# Patient Record
Sex: FEMALE | Race: WHITE | NOT HISPANIC OR LATINO | Employment: STUDENT | URBAN - METROPOLITAN AREA
[De-identification: names, ages, dates, MRNs, and addresses within clinical notes are randomized per-mention and may not be internally consistent; named-entity substitution may affect disease eponyms.]

---
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
## 2018-05-30 ENCOUNTER — OFFICE VISIT (OUTPATIENT)
Dept: INTERNAL MEDICINE | Facility: CLINIC | Age: 18
End: 2018-05-30
Payer: COMMERCIAL

## 2018-05-30 VITALS
BODY MASS INDEX: 25.93 KG/M2 | HEIGHT: 64 IN | SYSTOLIC BLOOD PRESSURE: 124 MMHG | HEART RATE: 106 BPM | TEMPERATURE: 99 F | WEIGHT: 151.88 LBS | DIASTOLIC BLOOD PRESSURE: 95 MMHG

## 2018-05-30 DIAGNOSIS — J06.9 UPPER RESPIRATORY INFECTION, VIRAL: ICD-10-CM

## 2018-05-30 DIAGNOSIS — Z87.09 HISTORY OF ASTHMA: ICD-10-CM

## 2018-05-30 DIAGNOSIS — J30.2 SEASONAL ALLERGIC RHINITIS, UNSPECIFIED TRIGGER: Primary | ICD-10-CM

## 2018-05-30 DIAGNOSIS — Z23 NEED FOR HPV VACCINATION: ICD-10-CM

## 2018-05-30 PROCEDURE — 99999 PR PBB SHADOW E&M-EST. PATIENT-LVL IV: CPT | Mod: PBBFAC,,, | Performed by: STUDENT IN AN ORGANIZED HEALTH CARE EDUCATION/TRAINING PROGRAM

## 2018-05-30 PROCEDURE — 3008F BODY MASS INDEX DOCD: CPT | Mod: CPTII,S$GLB,, | Performed by: STUDENT IN AN ORGANIZED HEALTH CARE EDUCATION/TRAINING PROGRAM

## 2018-05-30 PROCEDURE — 99385 PREV VISIT NEW AGE 18-39: CPT | Mod: S$GLB,,, | Performed by: STUDENT IN AN ORGANIZED HEALTH CARE EDUCATION/TRAINING PROGRAM

## 2018-05-30 RX ORDER — LORATADINE 10 MG/1
10 TABLET ORAL DAILY
Refills: 0 | COMMUNITY
Start: 2018-05-30 | End: 2024-01-12

## 2018-05-30 NOTE — PROGRESS NOTES
Clinic Note  5/30/2018      Subjective:       Patient ID:  Almas is a 18 y.o. female being seen for an established visit.    Chief Complaint: Annual Exam    18 years old female patient with past medical history of asthma came to establish care and complaining of nasal congestion.   Patient was in her usual state of health until sunday morning, 3 days ago, when she had abdominal discomfort and diarrhea 1 time, non bloody and resolved and later developed sore throat, nasal congestion, chest tightness,mild dry cough and runny nose, yesterday she noticed orange mucus when she blow her nose. Has b/l ear ringing but no ear pain, sore throat resolved, denied chest pain, shortness of breath, fever/chills, palpitation.  Patient exercise daily for 20 min, started vegetarian diet few days ago, college student, lives with her mother, doesn't smoke or drink.        Review of Systems   Constitutional: Negative for chills and fever.   HENT: Positive for tinnitus (B/L). Negative for hearing loss.    Eyes: Negative for double vision and photophobia.   Respiratory: Negative for sputum production and shortness of breath.    Cardiovascular: Negative for chest pain and palpitations.   Gastrointestinal: Negative for heartburn, nausea and vomiting.   Genitourinary: Negative for dysuria and urgency.   Musculoskeletal: Negative for back pain, myalgias and neck pain.   Skin: Negative for itching and rash.   Neurological: Negative for dizziness, tingling and headaches.   Endo/Heme/Allergies: Does not bruise/bleed easily.   Psychiatric/Behavioral: Negative for depression, substance abuse and suicidal ideas.       Past Medical History:   Diagnosis Date    Asthma     Kidney stone     Seasonal allergic rhinitis 5/30/2018       Family History   Problem Relation Age of Onset    Diabetes Maternal Grandmother         reports that she has never smoked. She has never used smokeless tobacco. She reports that she does not drink alcohol or use  "drugs.    Medication List with Changes/Refills   New Medications    LORATADINE (CLARITIN) 10 MG TABLET    Take 1 tablet (10 mg total) by mouth once daily.   Discontinued Medications    ONDANSETRON (ZOFRAN) 4 MG TABLET    Take 1 tablet (4 mg total) by mouth every 6 (six) hours as needed.     Review of patient's allergies indicates:   Allergen Reactions    Ceftriaxone Hives    Cefzil [cefprozil] Hives       Patient Active Problem List   Diagnosis    Seasonal allergic rhinitis           Objective:      BP (!) 124/95   Pulse 106   Temp 98.5 °F (36.9 °C)   Ht 5' 4" (1.626 m)   Wt 68.9 kg (151 lb 14.4 oz)   BMI 26.07 kg/m²   Estimated body mass index is 26.07 kg/m² as calculated from the following:    Height as of this encounter: 5' 4" (1.626 m).    Weight as of this encounter: 68.9 kg (151 lb 14.4 oz).  Physical Exam   Constitutional: She is oriented to person, place, and time and well-developed, well-nourished, and in no distress. No distress.   HENT:   Head: Normocephalic and atraumatic.   Right Ear: Tympanic membrane, external ear and ear canal normal. No drainage or swelling. Tympanic membrane is not erythematous.   Left Ear: Tympanic membrane, external ear and ear canal normal. No drainage or swelling. Tympanic membrane is not erythematous.   Mouth/Throat: Uvula is midline and mucous membranes are normal. Mucous membranes are not pale and not dry. No oropharyngeal exudate.   Facial acne    Eyes: Conjunctivae are normal. Pupils are equal, round, and reactive to light. No scleral icterus.   Neck: Neck supple.   Cardiovascular: Normal rate, regular rhythm and normal heart sounds.    No murmur heard.  Pulmonary/Chest: Effort normal. No respiratory distress. She has no wheezes. She has no rales.   Abdominal: Soft. Bowel sounds are normal. She exhibits no distension. There is no tenderness.   Musculoskeletal: Normal range of motion. She exhibits no edema, tenderness or deformity.   Lymphadenopathy:     She has no " cervical adenopathy.   Neurological: She is alert and oriented to person, place, and time. No cranial nerve deficit.   Skin: No rash noted. She is not diaphoretic. No erythema. No pallor.   Psychiatric: Memory, affect and judgment normal.   Nursing note and vitals reviewed.        Assessment and Plan:         Almas was seen today for annual exam.    Diagnoses and all orders for this visit:    Encounter to establish care   - HPV vaccine series.   Upper respiratory infection, viral  - reassurance   - supportive therapy.    Seasonal allergic rhinitis, unspecified trigger    -  loratadine (CLARITIN) 10 mg tablet; Take 1 tablet (10 mg total) by mouth once daily.        Michoacano Garcia  Internal Medicine, PGY 1  [phone]

## 2018-05-30 NOTE — PATIENT INSTRUCTIONS
Adult Self-Care for Colds  Colds are caused by viruses. They can't be cured with antibiotics. However, you can ease symptoms and support your body's efforts to heal itself.  No matter which symptoms you have, be sure to:  · Drink plenty of fluids (water or clear soup)  · Stop smoking and drinking alcohol  · Get plenty of rest    Understand a fever  · Take your temperature several times a day. If your fever is 100.4°F (38.0°C) for more than a day, call your healthcare provider.  · Relax, lie down. Go to bed if you want. Just get off your feet and rest. Also, drink plenty of fluids to avoid dehydration.  · Take acetaminophen or a nonsteroidal anti-inflammatory agent (NSAID), such as ibuprofen.  Treat a troubled nose kindly  · Breathe steam or heated humidified air to open blocked nasal passages.  a hot shower or use a vaporizer. Be careful not to get burned by the steam.  · Saline nasal sprays and decongestant tablets help open a stuffy nose. Antihistamines can also help, but they can cause side effects such as drowsiness and drying of the eyes, nose, and mouth.  Soothe a sore throat and cough  · Gargle every 2 hours with 1/4 teaspoon of salt dissolved in 1/2 cup of warm water. Suck on throat lozenges and cough drops to moisten your throat.  · Cough medicines are available but it is unclear how well they actually work.  · Take acetaminophen or an NSAID, such as ibuprofen, to ease throat pain  Ease digestive problems  · Put fluids back into your body. Take frequent sips of clear liquids such as water or broth. Avoid drinks that have a lot of sugar in them, such as juices and sodas. These can make diarrhea worse. Older children and adults can drink sports drinks.  · As your appetite returns, you can resume your normal diet. Ask your healthcare provider if there are any foods you should avoid.  When to seek medical care  When you first notice symptoms, ask your healthcare provider if antiviral medicines are  appropriate. Antibiotics should not be taken for colds or flu. Also, call your healthcare provider if you have any of the following symptoms or if you aren't feeling better after 7 days:  · Shortness of breath  · Pain or pressure in the chest or belly (abdomen)  · Worsening symptoms, especially after a period of improvement  · Fever of 100.4°F  (38.0°C) or higher, or fever that doesn't go down with medicine  · Sudden dizziness or confusion  · Severe or continued vomiting  · Signs of dehydration, including extreme thirst, dark urine, infrequent urination, dry mouth  · Spotted, red, or very sore throat   Date Last Reviewed: 12/1/2016 © 2000-2017 [employer]. [address] [place] [address], [place] [address]. All rights reserved. This information is not intended as a substitute for professional medical care. Always follow your healthcare professional's instructions.        What is Genital HPV?  HPV (human papillomavirus) is a very common family of viruses. Some strains of genital HPV can cause abnormal changes (dysplasia) in the cells of a womans cervix. In a small number of women, these changes can lead to cancer if not treated. Also, certain strains of HPV can cause genital warts (condyloma). Although many people carry HPV, it often causes no symptoms. The virus may first be detected when signs of dysplasia or warts are found during a Pap test.     Dysplasia develops when cervical cells change in ways that are not normal.             Warts on the cervix can be detected with a Pap test.             Warts around the genitals may be visible. These external warts can affect the vulva, vagina, and anus.      How does HPV spread?  HPV lives inside skin and mucous membranes. It spreads when skin carrying the virus touches other skin. Genital HPV most often spreads during sexual contact. Condoms and other barriers help protect against the spread by preventing skin contact. But condoms may not cover all affected skin,  so they may not provide complete protection. There is no cure for HPV. Even if symptoms go away, the virus may remain in the body. Because it often doesnt cause symptoms, many people who have HPV dont even know it.  When dysplasia occurs  The cervix is the narrow canal at the bottom of the uterus. Its made up of layers of cells that normally change as they grow. Dysplasia occurs when HPV causes some cervical cells to change in ways that are not normal. These abnormal cells can be detected with a Pap test. Left  untreated, abnormal cells can develop into cancer.  When warts form  Certain strains of HPV can make skin cells reproduce more often than they should. These extra skin cells build up into warts. Warts can form on the cervix. They can also form around or inside the genitals. Treating warts helps keep HPV from spreading to sexual partners.     A vaccine is available that protects against certain strains of HPV. Your health care provider can tell you more.   Date Last Reviewed: 1/1/2017  © 5635-0243 [employer]. [address] [place] [address], [place] [address]. All rights reserved. This information is not intended as a substitute for professional medical care. Always follow your healthcare professional's instructions.

## 2018-06-04 NOTE — PROGRESS NOTES
Preceptor note    Patient's history and physical discussed, please refer to resident physician's note for specific details. Pt seen with resident physician. Medical record reviewed. I agree with resident's assessment and plan.    In addition, provided health maintenance counseling -  1. Reviewed immunization in LINKS - only due for HPV series; patient advised that she needs a series of 3 vaccines  2. Patient advised to start cervical cancer screening at age 19  3. Patient is otherwise up-to-date

## 2018-09-07 ENCOUNTER — OFFICE VISIT (OUTPATIENT)
Dept: DERMATOLOGY | Facility: CLINIC | Age: 18
End: 2018-09-07
Payer: COMMERCIAL

## 2018-09-07 DIAGNOSIS — B36.0 TINEA VERSICOLOR: ICD-10-CM

## 2018-09-07 DIAGNOSIS — L70.0 ACNE VULGARIS: Primary | ICD-10-CM

## 2018-09-07 PROCEDURE — 99999 PR PBB SHADOW E&M-EST. PATIENT-LVL III: CPT | Mod: PBBFAC,,, | Performed by: NURSE PRACTITIONER

## 2018-09-07 PROCEDURE — 99202 OFFICE O/P NEW SF 15 MIN: CPT | Mod: S$GLB,,, | Performed by: NURSE PRACTITIONER

## 2018-09-07 RX ORDER — CLINDAMYCIN PHOSPHATE AND TRETINOIN GEL 1.2%/0.025% 10; .25 MG/G; MG/G
GEL TOPICAL NIGHTLY
Qty: 30 G | Refills: 2 | Status: SHIPPED | OUTPATIENT
Start: 2018-09-07 | End: 2021-01-28

## 2018-09-07 RX ORDER — SPIRONOLACTONE 50 MG/1
TABLET, FILM COATED ORAL
Qty: 60 TABLET | Refills: 3 | Status: SHIPPED | OUTPATIENT
Start: 2018-09-07 | End: 2018-11-09

## 2018-09-07 RX ORDER — DOXYCYCLINE 100 MG/1
CAPSULE ORAL
Qty: 30 CAPSULE | Refills: 1 | Status: SHIPPED | OUTPATIENT
Start: 2018-09-07 | End: 2020-04-15 | Stop reason: SDUPTHER

## 2018-09-07 RX ORDER — KETOCONAZOLE 20 MG/ML
SHAMPOO, SUSPENSION TOPICAL
Qty: 120 ML | Refills: 5 | Status: SHIPPED | OUTPATIENT
Start: 2018-09-07 | End: 2021-01-28

## 2018-09-07 NOTE — PATIENT INSTRUCTIONS

## 2018-09-07 NOTE — PROGRESS NOTES
"  Subjective:       Patient ID:  Almas Colon is a 18 y.o. female who presents for   Chief Complaint   Patient presents with    Acne     Acne  - Initial  Affected locations: face  Duration: 1 month  Signs / symptoms: redness and tender  Severity: mild  Timing: constant  Aggravated by: nothing  Treatments tried: washing face BID w/ "black african soap"       Pt reports previously was seen by derm ~years ago. Not @ [facility].    Review of Systems   Genitourinary: Negative for irregular periods (not on ocp ).        H/o kidney stones 2/2 cocacola intake   Skin: Positive for activity-related sunscreen use. Negative for daily sunscreen use.        Objective:    Physical Exam   Constitutional: She appears well-developed and well-nourished. No distress.   Neurological: She is alert and oriented to person, place, and time. She is not disoriented.   Psychiatric: She has a normal mood and affect.   Skin:   Areas Examined (abnormalities noted in diagram):   Head / Face Inspection Performed  Neck Inspection Performed  Chest / Axilla Inspection Performed  Back Inspection Performed  RUE Inspected  LUE Inspection Performed                   Diagram Legend     Erythematous scaling macule/papule c/w actinic keratosis       Vascular papule c/w angioma      Pigmented verrucoid papule/plaque c/w seborrheic keratosis      Yellow umbilicated papule c/w sebaceous hyperplasia      Irregularly shaped tan macule c/w lentigo     1-2 mm smooth white papules consistent with Milia      Movable subcutaneous cyst with punctum c/w epidermal inclusion cyst      Subcutaneous movable cyst c/w pilar cyst      Firm pink to brown papule c/w dermatofibroma      Pedunculated fleshy papule(s) c/w skin tag(s)      Evenly pigmented macule c/w junctional nevus     Mildly variegated pigmented, slightly irregular-bordered macule c/w mildly atypical nevus      Flesh colored to evenly pigmented papule c/w intradermal nevus       Pink pearly papule/plaque c/w " basal cell carcinoma      Erythematous hyperkeratotic cursted plaque c/w SCC      Surgical scar with no sign of skin cancer recurrence      Open and closed comedones      Inflammatory papules and pustules      Verrucoid papule consistent consistent with wart     Erythematous eczematous patches and plaques     Dystrophic onycholytic nail with subungual debris c/w onychomycosis     Umbilicated papule    Erythematous-base heme-crusted tan verrucoid plaque consistent with inflamed seborrheic keratosis     Erythematous Silvery Scaling Plaque c/w Psoriasis     See annotation      Assessment / Plan:        Acne vulgaris  -     clindamycin-tretinoin (ZIANA) gel; Apply topically every evening.  Dispense: 30 g; Refill: 2  -     doxycycline (VIBRAMYCIN) 100 MG Cap; 1 tab PO bid x10 days followed by 1 tab po daily.  Dispense: 30 capsule; Refill: 1  -     spironolactone (ALDACTONE) 50 MG tablet; Start with 1 po qday, increase to 2 po qday as tolerated  Dispense: 60 tablet; Refill: 3    Discussed benefits and risks of therapy including but not limited to breakthrough bleeding, breast tenderness, and elevated potassium levels which may give symptoms of fatigue, palpitations, and nausea. Patient should limit potassium intake - avoid potassium supplements or salt substitutes, limit bananas and citrus fruits. Pregnancy must be avoided while taking spironolactone.    Discussed benefits and risks of doxycyline therapy including but not limited to GI discomfort, esophageal irritation/ulceration, and increased sun sensitivity. Patient was counseled to take medicine with meals and at least 1 hour before lying down.     Discussed using pea-sized drop to entire face qhs.  Start applying every 2-3 nights then gradually increase to nightly application as tolerated.  May notice mild redness and irritation- cerave pm after.    Wash face BID w/ cerave q am and [place] arthur green tea q pm    Emphasised hydration status while on spironolactone    Tinea  versicolor  -     ketoconazole (NIZORAL) 2 % shampoo; Wash body and chest nightly x2 weeks. And then weekly after for maintenance  Dispense: 120 mL; Refill: 5    Discussed TV is an overgrowth of yeast on the skin. Yeast normally lives on our skin surface, however, when living in hot and humid climates yeast will grow more quickly. An overgrowth of yeast on the skin can cause Tv. TV is not contagious.            Follow-up in about 2 months (around 11/7/2018).

## 2018-09-07 NOTE — LETTER
September 7, 2018      PADILLA Sheets  [address] [facility] [address]           [facility]  [address]  [place] [address]  Phone: [phone]  Fax: [phone]          Patient: Almas Colon   MR Number: [id number]   YOB: 2000   Date of Visit: 9/7/2018       Dear Dr. Michoacano Garcia:    Thank you for referring Almas Colon to me for evaluation. Attached you will find relevant portions of my assessment and plan of care.    If you have questions, please do not hesitate to call me. I look forward to following Almas Colon along with you.    Sincerely,    Kassandra Allison, NP    Enclosure  CC:  No Recipients    If you would like to receive this communication electronically, please contact [email or web address] or [phone] to request more information on [employer] Link access.    For providers and/or their staff who would like to refer a patient to [facility], please contact us through our one-stop-shop provider referral line, [facility], at [phone].    If you feel you have received this communication in error or would no longer like to receive these types of communications, please e-mail [email or web address]

## 2018-11-09 ENCOUNTER — OFFICE VISIT (OUTPATIENT)
Dept: DERMATOLOGY | Facility: CLINIC | Age: 18
End: 2018-11-09
Payer: COMMERCIAL

## 2018-11-09 DIAGNOSIS — L81.0 POSTINFLAMMATORY HYPERPIGMENTATION: ICD-10-CM

## 2018-11-09 DIAGNOSIS — L70.0 ACNE VULGARIS: Primary | ICD-10-CM

## 2018-11-09 DIAGNOSIS — L90.5 SCAR: ICD-10-CM

## 2018-11-09 PROCEDURE — 99213 OFFICE O/P EST LOW 20 MIN: CPT | Mod: S$GLB,,, | Performed by: NURSE PRACTITIONER

## 2018-11-09 PROCEDURE — 99999 PR PBB SHADOW E&M-EST. PATIENT-LVL II: CPT | Mod: PBBFAC,,, | Performed by: NURSE PRACTITIONER

## 2018-11-09 RX ORDER — DAPSONE 75 MG/G
GEL TOPICAL
Qty: 60 G | Refills: 3 | Status: SHIPPED | OUTPATIENT
Start: 2018-11-09 | End: 2021-01-28

## 2018-11-09 RX ORDER — TAZAROTENE 1 MG/G
CREAM TOPICAL NIGHTLY
Qty: 30 G | Refills: 2 | Status: SHIPPED | OUTPATIENT
Start: 2018-11-09 | End: 2020-04-15 | Stop reason: SDUPTHER

## 2018-11-09 NOTE — PROGRESS NOTES
Subjective:       Patient ID:  Almas Colon is a 18 y.o. female who presents for   Chief Complaint   Patient presents with    Follow-up     Acne f/u     Acne  - Follow-up  Symptom course: stable (9/7/18)  Currently using: washing face BID cerave. ziana nightly. stopped doxy, stopped spironolatone 2/2 unable to tolerate- effecting concentration   Affected locations: face, back, neck and chest  Signs / symptoms: redness and tender  Severity: mild        Review of Systems   Constitutional: Negative for fever, chills and fatigue.   HENT: Negative for nosebleeds and headaches.    Gastrointestinal: Negative for nausea and vomiting.   Genitourinary: Negative for irregular periods.        H/o kidney stones 2/2 carbonated beverages   Skin: Positive for daily sunscreen use and activity-related sunscreen use. Negative for recent sunburn.   Neurological: Negative for headaches.   Hematologic/Lymphatic: Bruises/bleeds easily.        Objective:    Physical Exam   Constitutional: She appears well-developed and well-nourished. No distress.   Neurological: She is alert and oriented to person, place, and time. She is not disoriented.   Psychiatric: She has a normal mood and affect.   Skin:   Areas Examined (abnormalities noted in diagram):   Head / Face Inspection Performed  Neck Inspection Performed  Chest / Axilla Inspection Performed  Back Inspection Performed  RUE Inspected  LUE Inspection Performed                   Diagram Legend     Erythematous scaling macule/papule c/w actinic keratosis       Vascular papule c/w angioma      Pigmented verrucoid papule/plaque c/w seborrheic keratosis      Yellow umbilicated papule c/w sebaceous hyperplasia      Irregularly shaped tan macule c/w lentigo     1-2 mm smooth white papules consistent with Milia      Movable subcutaneous cyst with punctum c/w epidermal inclusion cyst      Subcutaneous movable cyst c/w pilar cyst      Firm pink to brown papule c/w dermatofibroma       Pedunculated fleshy papule(s) c/w skin tag(s)      Evenly pigmented macule c/w junctional nevus     Mildly variegated pigmented, slightly irregular-bordered macule c/w mildly atypical nevus      Flesh colored to evenly pigmented papule c/w intradermal nevus       Pink pearly papule/plaque c/w basal cell carcinoma      Erythematous hyperkeratotic cursted plaque c/w SCC      Surgical scar with no sign of skin cancer recurrence      Open and closed comedones      Inflammatory papules and pustules      Verrucoid papule consistent consistent with wart     Erythematous eczematous patches and plaques     Dystrophic onycholytic nail with subungual debris c/w onychomycosis     Umbilicated papule    Erythematous-base heme-crusted tan verrucoid plaque consistent with inflamed seborrheic keratosis     Erythematous Silvery Scaling Plaque c/w Psoriasis     See annotation      Assessment / Plan:        Acne vulgaris  Mostly comedones noted today w/ few scattered inflammatory papules  -     tazarotene (TAZORAC) 0.1 % cream; Apply topically every evening.  Dispense: 30 g; Refill: 2  -     ACZONE 7.5 % GlwP; AAA face daily - bid  Dispense: 60 g; Refill: 3  -     benzoyl peroxide (BENZEPRO) 6 % Towl; Use daily in shower to trunk/neck  Dispense: 1 each; Refill: 5    Ok to use ziana on back and chest    Wash nightly back and chest w/ BP wipes- Patient cautioned that Benzoyl Peroxide can act as a bleaching agent removing color from clothing, towels, pillowcases. Care should be taken to avoid contact with fabrics.     Start tazorac apply every other night and increase to nightly as tolerated. Continue nonmedicated moisturizing cream    Discussed starting Spironolactone 25mg nightly. Increase to 50mg when on break from school.    Aczone BID spot treatment    Post inflammatory hyperpigmentation  Patient instructed in importance in daily sun protection of at least spf 30. Sun avoidance and topical protection discussed.     Recommend elta MD  clear for daily use on face and neck.    Acne Scar  Discussed possible course of peels vs laser for scar treatment in the future         Follow-up in about 2 months (around 1/9/2019).

## 2018-11-23 ENCOUNTER — TELEPHONE (OUTPATIENT)
Dept: OPHTHALMOLOGY | Facility: CLINIC | Age: 18
End: 2018-11-23

## 2019-01-18 ENCOUNTER — OFFICE VISIT (OUTPATIENT)
Dept: OPTOMETRY | Facility: CLINIC | Age: 19
End: 2019-01-18
Payer: COMMERCIAL

## 2019-01-18 DIAGNOSIS — H52.13 MYOPIA OF BOTH EYES: Primary | ICD-10-CM

## 2019-01-18 PROCEDURE — 92015 PR REFRACTION: ICD-10-PCS | Mod: S$GLB,,, | Performed by: OPTOMETRIST

## 2019-01-18 PROCEDURE — 92004 PR EYE EXAM, NEW PATIENT,COMPREHESV: ICD-10-PCS | Mod: S$GLB,,, | Performed by: OPTOMETRIST

## 2019-01-18 PROCEDURE — 99999 PR PBB SHADOW E&M-EST. PATIENT-LVL II: ICD-10-PCS | Mod: PBBFAC,,, | Performed by: OPTOMETRIST

## 2019-01-18 PROCEDURE — 92004 COMPRE OPH EXAM NEW PT 1/>: CPT | Mod: S$GLB,,, | Performed by: OPTOMETRIST

## 2019-01-18 PROCEDURE — 99999 PR PBB SHADOW E&M-EST. PATIENT-LVL II: CPT | Mod: PBBFAC,,, | Performed by: OPTOMETRIST

## 2019-01-18 PROCEDURE — 92015 DETERMINE REFRACTIVE STATE: CPT | Mod: S$GLB,,, | Performed by: OPTOMETRIST

## 2019-01-18 NOTE — MEDICAL/APP STUDENT
Pt reports blurry vision at distance OU, OS>OD. Never wore glasses before. 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 Does not use gtts. Denies eye pain, redness or irritation.  OMAR: never had an eye exam  PMH: unremarkable  FOH: grandmother on paternal's side has glaucoma  FMH: diabetes

## 2019-01-18 NOTE — PROGRESS NOTES
HPI     Pt reports blurry vision at distance OU, OS>OD. Never wore glasses   before. 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 Does not use gtts. Denies eye pain, redness or irritation.  OMAR: never had an eye exam  PMH: unremarkable  FOH: grandmother on paternal's side has glaucoma  FMH: diabetes        Last edited by Samm Vance, OD on 1/18/2019 11:50 AM. (History)            Assessment /Plan     For exam results, see Encounter Report.    Myopia of both eyes      1. Spec Rx given. Different lens options discussed with patient. RTC 1 year full exam.

## 2019-02-05 ENCOUNTER — TELEPHONE (OUTPATIENT)
Dept: OPTOMETRY | Facility: CLINIC | Age: 19
End: 2019-02-05

## 2019-02-05 NOTE — TELEPHONE ENCOUNTER
----- Message from Ahmet Muñoz sent at 2/4/2019  4:36 PM CST -----  Contact: shasta katy  Ms. Colon needs to schedule a prescription recheck for her daughter. She is college student and would like for her to be seen this Friday. She can be reached at [phone] or [phone]

## 2019-04-17 ENCOUNTER — OFFICE VISIT (OUTPATIENT)
Dept: URGENT CARE | Facility: CLINIC | Age: 19
End: 2019-04-17
Payer: COMMERCIAL

## 2019-04-17 VITALS
TEMPERATURE: 98 F | SYSTOLIC BLOOD PRESSURE: 120 MMHG | HEIGHT: 64 IN | OXYGEN SATURATION: 99 % | BODY MASS INDEX: 25.61 KG/M2 | DIASTOLIC BLOOD PRESSURE: 81 MMHG | RESPIRATION RATE: 19 BRPM | WEIGHT: 150 LBS | HEART RATE: 83 BPM

## 2019-04-17 DIAGNOSIS — J02.9 SORE THROAT: Primary | ICD-10-CM

## 2019-04-17 LAB
CTP QC/QA: YES
S PYO RRNA THROAT QL PROBE: NEGATIVE

## 2019-04-17 PROCEDURE — 87081 CULTURE SCREEN ONLY: CPT

## 2019-04-17 PROCEDURE — 3008F BODY MASS INDEX DOCD: CPT | Mod: CPTII,S$GLB,, | Performed by: PHYSICIAN ASSISTANT

## 2019-04-17 PROCEDURE — 99213 PR OFFICE/OUTPT VISIT, EST, LEVL III, 20-29 MIN: ICD-10-PCS | Mod: S$GLB,,, | Performed by: PHYSICIAN ASSISTANT

## 2019-04-17 PROCEDURE — 3008F PR BODY MASS INDEX (BMI) DOCUMENTED: ICD-10-PCS | Mod: CPTII,S$GLB,, | Performed by: PHYSICIAN ASSISTANT

## 2019-04-17 PROCEDURE — 87880 STREP A ASSAY W/OPTIC: CPT | Mod: QW,S$GLB,, | Performed by: PHYSICIAN ASSISTANT

## 2019-04-17 PROCEDURE — 99213 OFFICE O/P EST LOW 20 MIN: CPT | Mod: S$GLB,,, | Performed by: PHYSICIAN ASSISTANT

## 2019-04-17 PROCEDURE — 87880 POCT RAPID STREP A: ICD-10-PCS | Mod: QW,S$GLB,, | Performed by: PHYSICIAN ASSISTANT

## 2019-04-18 NOTE — PATIENT INSTRUCTIONS

## 2019-04-18 NOTE — PROGRESS NOTES
"Subjective:       Patient ID: Almas Colon is a 19 y.o. female.    Vitals:  height is 5' 4" (1.626 m) and weight is 68 kg (150 lb). Her oral temperature is 98.1 °F (36.7 °C). Her blood pressure is 120/81 and her pulse is 83. Her respiration is 19 and oxygen saturation is 99%.     Chief Complaint: Sore Throat    Sore Throat    This is a new problem. The current episode started yesterday. The problem has been unchanged. There has been no fever. The pain is at a severity of 7/10. The pain is moderate. Associated symptoms include a hoarse voice. Pertinent negatives include no abdominal pain, congestion, coughing, diarrhea, drooling, ear discharge, ear pain, headaches, plugged ear sensation, neck pain, shortness of breath, stridor, swollen glands, trouble swallowing or vomiting. She has had exposure to strep. She has had no exposure to mono. She has tried nothing for the symptoms. The treatment provided no relief.       Constitution: Positive for fatigue. Negative for chills, sweating and fever.   HENT: Positive for sore throat. Negative for ear pain, ear discharge, drooling, congestion, sinus pain, sinus pressure, trouble swallowing and voice change.    Neck: Negative for neck pain and painful lymph nodes.   Eyes: Negative for eye redness.   Respiratory: Negative for chest tightness, cough, sputum production, bloody sputum, COPD, shortness of breath, stridor, wheezing and asthma.    Gastrointestinal: Negative for abdominal pain, nausea, vomiting and diarrhea.   Musculoskeletal: Negative for muscle ache.   Skin: Negative for rash.   Allergic/Immunologic: Negative for seasonal allergies and asthma.   Neurological: Negative for headaches.   Hematologic/Lymphatic: Negative for swollen lymph nodes.       Objective:      Physical Exam   Constitutional: She is oriented to person, place, and time. She appears well-developed and well-nourished. She is cooperative.  Non-toxic appearance. She does not appear ill. No distress. "   HENT:   Head: Normocephalic and atraumatic.   Right Ear: Hearing, tympanic membrane, external ear and ear canal normal.   Left Ear: Hearing, tympanic membrane, external ear and ear canal normal.   Nose: Nose normal. No mucosal edema, rhinorrhea or nasal deformity. No epistaxis. Right sinus exhibits no maxillary sinus tenderness and no frontal sinus tenderness. Left sinus exhibits no maxillary sinus tenderness and no frontal sinus tenderness.   Mouth/Throat: Uvula is midline, oropharynx is clear and moist and mucous membranes are normal. No trismus in the jaw. Normal dentition. No uvula swelling. No posterior oropharyngeal erythema.   Eyes: Conjunctivae and lids are normal. Right eye exhibits no discharge. Left eye exhibits no discharge. No scleral icterus.   Sclera clear bilat   Neck: Trachea normal, normal range of motion, full passive range of motion without pain and phonation normal. Neck supple.   Cardiovascular: Normal rate, regular rhythm, normal heart sounds, intact distal pulses and normal pulses.   Pulmonary/Chest: Effort normal and breath sounds normal. No respiratory distress.   Abdominal: Soft. Normal appearance and bowel sounds are normal. She exhibits no distension, no pulsatile midline mass and no mass. There is no tenderness.   Musculoskeletal: Normal range of motion. She exhibits no edema or deformity.   Neurological: She is alert and oriented to person, place, and time. She exhibits normal muscle tone. Coordination normal.   Skin: Skin is warm, dry and intact. She is not diaphoretic. No pallor.   Psychiatric: She has a normal mood and affect. Her speech is normal and behavior is normal. Judgment and thought content normal. Cognition and memory are normal.   Nursing note and vitals reviewed.      Assessment:       1. Sore throat        Plan:          likely viral.  Supportive care.    Sore throat  -     POCT rapid strep A  -     Strep A culture, throat

## 2019-04-19 ENCOUNTER — TELEPHONE (OUTPATIENT)
Dept: URGENT CARE | Facility: CLINIC | Age: 19
End: 2019-04-19

## 2019-04-20 LAB — BACTERIA THROAT CULT: NORMAL

## 2019-12-03 ENCOUNTER — OFFICE VISIT (OUTPATIENT)
Dept: URGENT CARE | Facility: CLINIC | Age: 19
End: 2019-12-03
Payer: COMMERCIAL

## 2019-12-03 VITALS
BODY MASS INDEX: 25.61 KG/M2 | SYSTOLIC BLOOD PRESSURE: 129 MMHG | HEIGHT: 64 IN | DIASTOLIC BLOOD PRESSURE: 83 MMHG | WEIGHT: 150 LBS | OXYGEN SATURATION: 100 % | HEART RATE: 75 BPM | RESPIRATION RATE: 16 BRPM | TEMPERATURE: 98 F

## 2019-12-03 DIAGNOSIS — H92.01 OTALGIA OF RIGHT EAR: Primary | ICD-10-CM

## 2019-12-03 PROCEDURE — 99214 OFFICE O/P EST MOD 30 MIN: CPT | Mod: S$GLB,,, | Performed by: FAMILY MEDICINE

## 2019-12-03 PROCEDURE — 99214 PR OFFICE/OUTPT VISIT, EST, LEVL IV, 30-39 MIN: ICD-10-PCS | Mod: S$GLB,,, | Performed by: FAMILY MEDICINE

## 2019-12-03 RX ORDER — PREDNISONE 20 MG/1
TABLET ORAL
Qty: 10 TABLET | Refills: 1 | Status: SHIPPED | OUTPATIENT
Start: 2019-12-03 | End: 2020-04-15

## 2019-12-03 RX ORDER — FLUTICASONE PROPIONATE 50 MCG
1 SPRAY, SUSPENSION (ML) NASAL 2 TIMES DAILY
Qty: 1 BOTTLE | Refills: 2 | Status: SHIPPED | OUTPATIENT
Start: 2019-12-03 | End: 2021-01-28

## 2019-12-03 NOTE — PATIENT INSTRUCTIONS
Treatment for Ear Barotrauma    Ear barotrauma is a type of ear damage. It is caused by a difference in pressure between the inside of the ear and the air around you. It can cause pain and may lead to lasting hearing loss. It can harm the eardrum. The eardrum is between the outer and middle ear. Harm to the eardrum can cause bleeding or other damage to the outer, middle, or inner ear.  Types of treatment  You may be referred to an ear, nose, and throat doctor (otolaryngologist). You may not need any treatment. Most barotrauma injuries heal on their own with time, and your symptoms will go away. But your eardrum may not heal as usual if a blast caused the injury.  Your healthcare provider may tell you to rest in bed with your head raised on a pillow. You may also need to take medicine, such as:  · Pain medicines  · Nasal steroids and decongestants, to reduce congestion around the Eustachian tube opening  · Antibiotics, if an infection develops  You may need surgery if your ear barotrauma is severe. A surgeon may rebuild the eardrum or the opening into the inner ear. A tiny cut may be made in the eardrum. In rare cases, a ventilation tube in the eardrum may be needed.  What happens if you dont get treated?  In some cases, ear barotrauma can cause symptoms that dont go away such as:  · Dizziness  · Ringing in the ears  · Hearing loss that may require you to use a hearing aid  Follow your healthcare providers advice about best rest or surgery. This may help cut your risk for these problems.  Preventing ear barotrauma  You can take steps to help prevent ear barotrauma. If you are congested from a cold or allergies, you may want to postpone flying or scuba diving. Or you can take medicines such as a decongestant or antihistamine. These may help your ears equalize more easily and prevent ear barotrauma.  You can do certain things to open the Eustachian tube during pressure changes, such as:  · Swallowing  often  · Pinching your nose, closing your mouth, and then acting as if you were going to breathe out through your nose  · Chewing gum or candy  · Using special ear plugs during flying  Using a ventilation tube is a choice for some people whose Eustachian tubes dont work well or for those who need to fly often. These may help you if you need high pressure oxygen therapy for wound healing. A surgeon places these tubes in the eardrum. They then help even out pressure differences. Ventilation tubes cannot prevent ear barotrauma caused by diving.  Coping with ear barotrauma  If you are a diver, dont dive again until your injury has fully healed. Diving again too soon can cause reinjury. Your doctor will tell you when it is safe for you to dive again. You should also not fly until your doctor says it is OK.     When to call your healthcare provider  Call your healthcare provider right away if:  · Your symptoms dont get better  · You have any new symptoms, such as fever or hearing loss   Date Last Reviewed: 8/3/2015  © 6549-3212 [employer]. [address] [place] [address], [place] [address]. All rights reserved. This information is not intended as a substitute for professional medical care. Always follow your healthcare professional's instructions.

## 2019-12-03 NOTE — PROGRESS NOTES
"Subjective:       Patient ID: Almas Colon is a 19 y.o. female.    Vitals:  height is 5' 4" (1.626 m) and weight is 68 kg (150 lb). Her oral temperature is 98.1 °F (36.7 °C). Her blood pressure is 129/83 and her pulse is 75. Her respiration is 16 and oxygen saturation is 100%.     Chief Complaint: Otalgia    Pt presents today with right ear crackling and pressure X 1 week.     Otalgia    There is pain in the right ear. This is a new problem. The current episode started in the past 7 days. The problem occurs constantly. The problem has been unchanged. Pertinent negatives include no coughing, diarrhea, headaches, rash, sore throat or vomiting. She has tried nothing for the symptoms. The treatment provided no relief.       Constitution: Negative for chills, fatigue and fever.   HENT: Positive for ear pain. Negative for congestion and sore throat.    Neck: Negative for painful lymph nodes.   Cardiovascular: Negative for chest pain and leg swelling.   Eyes: Negative for double vision and blurred vision.   Respiratory: Negative for cough and shortness of breath.    Gastrointestinal: Negative for nausea, vomiting and diarrhea.   Genitourinary: Negative for dysuria, frequency, urgency and history of kidney stones.   Musculoskeletal: Negative for joint pain, joint swelling, muscle cramps and muscle ache.   Skin: Negative for color change, pale, rash and bruising.   Allergic/Immunologic: Negative for seasonal allergies.   Neurological: Negative for dizziness, history of vertigo, light-headedness, passing out and headaches.   Hematologic/Lymphatic: Negative for swollen lymph nodes.   Psychiatric/Behavioral: Negative for nervous/anxious, sleep disturbance and depression. The patient is not nervous/anxious.        Objective:      Physical Exam   Constitutional: She is oriented to person, place, and time. She appears well-developed and well-nourished.   HENT:   Head: Normocephalic and atraumatic.   Right Ear: External ear " and ear canal normal. Tympanic membrane is bulging. A middle ear effusion (clearclear) is present.   Left Ear: External ear and ear canal normal. A middle ear effusion is present.   Nose: Nose normal.   Mouth/Throat: Oropharynx is clear and moist.   Eyes: Pupils are equal, round, and reactive to light. Conjunctivae, EOM and lids are normal.   Neck: Trachea normal, full passive range of motion without pain and phonation normal. Neck supple.   Musculoskeletal: Normal range of motion.   Neurological: She is alert and oriented to person, place, and time.   Skin: Skin is warm, dry and intact.   Psychiatric: She has a normal mood and affect. Her speech is normal and behavior is normal. Judgment and thought content normal. Cognition and memory are normal.   Nursing note and vitals reviewed.        Assessment:       1. Otalgia of right ear        Plan:         Otalgia of right ear    Other orders  -     predniSONE (DELTASONE) 20 MG tablet; Take 40mg x2 days, 30 mg x2 days, 20mg x2 days, 10mg x2 days  Dispense: 10 tablet; Refill: 1  -     fluticasone propionate (FLONASE) 50 mcg/actuation nasal spray; 1 spray (50 mcg total) by Each Nostril route 2 (two) times daily.  Dispense: 1 Bottle; Refill: 2

## 2020-01-14 ENCOUNTER — OFFICE VISIT (OUTPATIENT)
Dept: URGENT CARE | Facility: CLINIC | Age: 20
End: 2020-01-14
Payer: COMMERCIAL

## 2020-01-14 VITALS
BODY MASS INDEX: 25.61 KG/M2 | HEART RATE: 106 BPM | RESPIRATION RATE: 18 BRPM | DIASTOLIC BLOOD PRESSURE: 81 MMHG | SYSTOLIC BLOOD PRESSURE: 123 MMHG | HEIGHT: 64 IN | TEMPERATURE: 101 F | OXYGEN SATURATION: 97 % | WEIGHT: 150 LBS

## 2020-01-14 DIAGNOSIS — J10.1 INFLUENZA B: ICD-10-CM

## 2020-01-14 DIAGNOSIS — R05.9 COUGH: Primary | ICD-10-CM

## 2020-01-14 DIAGNOSIS — Z87.09 HISTORY OF ASTHMA: ICD-10-CM

## 2020-01-14 LAB
CTP QC/QA: YES
FLUAV AG NPH QL: NEGATIVE
FLUBV AG NPH QL: POSITIVE

## 2020-01-14 PROCEDURE — 99214 OFFICE O/P EST MOD 30 MIN: CPT | Mod: S$GLB,,, | Performed by: FAMILY MEDICINE

## 2020-01-14 PROCEDURE — 99214 PR OFFICE/OUTPT VISIT, EST, LEVL IV, 30-39 MIN: ICD-10-PCS | Mod: S$GLB,,, | Performed by: FAMILY MEDICINE

## 2020-01-14 PROCEDURE — 87804 INFLUENZA ASSAY W/OPTIC: CPT | Mod: QW,S$GLB,, | Performed by: NURSE PRACTITIONER

## 2020-01-14 PROCEDURE — 87804 POCT INFLUENZA A/B: ICD-10-PCS | Mod: QW,S$GLB,, | Performed by: NURSE PRACTITIONER

## 2020-01-14 RX ORDER — OSELTAMIVIR PHOSPHATE 75 MG/1
75 CAPSULE ORAL 2 TIMES DAILY
Qty: 10 CAPSULE | Refills: 0 | Status: SHIPPED | OUTPATIENT
Start: 2020-01-14 | End: 2020-01-19

## 2020-01-14 RX ORDER — ALBUTEROL SULFATE 90 UG/1
2 AEROSOL, METERED RESPIRATORY (INHALATION) EVERY 6 HOURS PRN
Qty: 18 G | Refills: 2 | Status: SHIPPED | OUTPATIENT
Start: 2020-01-14 | End: 2023-06-01

## 2020-01-14 RX ORDER — METHYLPREDNISOLONE 4 MG/1
TABLET ORAL
Qty: 1 PACKAGE | Refills: 0 | Status: SHIPPED | OUTPATIENT
Start: 2020-01-14 | End: 2020-02-04

## 2020-01-14 NOTE — PROGRESS NOTES
"Subjective:       Patient ID: Almas Colon is a 19 y.o. female.    Vitals:  height is 5' 4" (1.626 m) and weight is 68 kg (150 lb). Her temperature is 100.5 °F (38.1 °C) (abnormal). Her blood pressure is 123/81 and her pulse is 106. Her respiration is 18 and oxygen saturation is 97%.     Chief Complaint: Influenza    Pt experiencing fever and cough x 1 day.  Pt states its a dry cough.  Fever of 102.8 2 hours ago and took tylenol to relieve fever.      Influenza   This is a new problem. The current episode started yesterday. The problem has been gradually worsening. Associated symptoms include coughing, a fever and headaches. Pertinent negatives include no arthralgias, chest pain, chills, congestion, fatigue, joint swelling, myalgias, nausea, rash, sore throat, vertigo, vomiting or weakness. Treatments tried: Tylenol. The treatment provided mild relief.       Constitution: Positive for fever. Negative for chills and fatigue.   HENT: Negative for congestion and sore throat.    Neck: Negative for painful lymph nodes.   Cardiovascular: Negative for chest pain and leg swelling.   Eyes: Negative for double vision and blurred vision.   Respiratory: Positive for cough. Negative for shortness of breath.    Gastrointestinal: Negative for nausea, vomiting and diarrhea.   Genitourinary: Negative for dysuria, frequency, urgency and history of kidney stones.   Musculoskeletal: Negative for joint pain, joint swelling, muscle cramps and muscle ache.   Skin: Negative for color change, pale, rash and bruising.   Allergic/Immunologic: Negative for seasonal allergies.   Neurological: Positive for headaches. Negative for dizziness, history of vertigo, light-headedness and passing out.   Hematologic/Lymphatic: Negative for swollen lymph nodes.   Psychiatric/Behavioral: Negative for nervous/anxious, sleep disturbance and depression. The patient is not nervous/anxious.        Objective:      Physical Exam   Constitutional: She is " oriented to person, place, and time. She appears well-developed and well-nourished. She is cooperative.  Non-toxic appearance. She does not have a sickly appearance. She does not appear ill. No distress.   HENT:   Head: Normocephalic and atraumatic.   Right Ear: Hearing, tympanic membrane, external ear and ear canal normal.   Left Ear: Hearing, tympanic membrane, external ear and ear canal normal.   Nose: Nose normal. No mucosal edema, rhinorrhea or nasal deformity. No epistaxis. Right sinus exhibits no maxillary sinus tenderness and no frontal sinus tenderness. Left sinus exhibits no maxillary sinus tenderness and no frontal sinus tenderness.   Mouth/Throat: Uvula is midline, oropharynx is clear and moist and mucous membranes are normal. No trismus in the jaw. Normal dentition. No uvula swelling. No oropharyngeal exudate, posterior oropharyngeal edema or posterior oropharyngeal erythema.   Eyes: Conjunctivae and lids are normal. No scleral icterus.   Neck: Trachea normal, full passive range of motion without pain and phonation normal. Neck supple. No neck rigidity. No edema and no erythema present.   Cardiovascular: Normal rate, regular rhythm, normal heart sounds, intact distal pulses and normal pulses.   Pulmonary/Chest: Effort normal and breath sounds normal. No respiratory distress. She has no decreased breath sounds. She has no wheezes. She has no rhonchi. She has no rales.   Abdominal: Normal appearance.   Musculoskeletal: Normal range of motion. She exhibits no edema or deformity.   Neurological: She is alert and oriented to person, place, and time. She exhibits normal muscle tone. Coordination normal.   Skin: Skin is warm, dry, intact, not diaphoretic and not pale.   Psychiatric: She has a normal mood and affect. Her speech is normal and behavior is normal. Judgment and thought content normal. Cognition and memory are normal.   Nursing note and vitals reviewed.        Assessment:       1. Cough    2.  Influenza B    3. History of asthma        Plan:         Cough  -     POCT Influenza A/B    Influenza B    History of asthma    Other orders  -     oseltamivir (TAMIFLU) 75 MG capsule; Take 1 capsule (75 mg total) by mouth 2 (two) times daily. for 5 days  Dispense: 10 capsule; Refill: 0  -     albuterol (PROVENTIL HFA) 90 mcg/actuation inhaler; Inhale 2 puffs into the lungs every 6 (six) hours as needed for Wheezing. Rescue  Dispense: 18 g; Refill: 2  -     methylPREDNISolone (MEDROL DOSEPACK) 4 mg tablet; use as directed  Dispense: 1 Package; Refill: 0     Influenza titer positive for b.  Am writing prescriptions for Medrol pack and albuterol inhaler the case the patient experiences asthma flare symptoms during the course of this viral infection.

## 2020-01-14 NOTE — LETTER
January 14, 2020      [facility] [address], [address]  [facility] [address]  Phone: [phone]  Fax: [phone]       Patient: Almas Colon   YOB: 2000  Date of Visit: 01/14/2020    To Whom It May Concern:    Orlando Colon  was at [facility] on 01/14/2020. She may return to work/school on 01/20/2020 with no restrictions. If you have any questions or concerns, or if I can be of further assistance, please do not hesitate to contact me.    Sincerely,    Karen Salmeron MA

## 2020-01-14 NOTE — PATIENT INSTRUCTIONS
The Flu (Influenza)     The virus that causes the flu spreads through the air in droplets when someone who has the flu coughs, sneezes, laughs, or talks.   The flu (influenza) is an infection that affects your respiratory tract. This tract is made up of your mouth, nose, and lungs, and the passages between them. Unlike a cold, the flu can make you very ill. And it can lead to pneumonia, a serious lung infection. The flu can have serious complications and even cause death.  Who is at risk for the flu?  Anyone can get the flu. But you are more likely to become infected if you:  · Have a weakened immune system  · Work in a healthcare setting where you may be exposed to flu germs  · Live or work with someone who has the flu  · Havent had an annual flu shot  How does the flu spread?  The flu is caused by a virus. The virus spreads through the air in droplets when someone who has the flu coughs, sneezes, laughs, or talks. You can become infected when you inhale these viruses directly. You can also become infected when you touch a surface on which the droplets have landed and then transfer the germs to your eyes, nose, or mouth. Touching used tissues, or sharing utensils, drinking glasses, or a toothbrush from an infected person can expose you to flu viruses, too.  What are the symptoms of the flu?  Flu symptoms tend to come on quickly and may last a few days to a few weeks. They include:  · Fever usually higher than 100.4°F  (38°C) and chills  · Sore throat and headache  · Dry cough  · Runny nose  · Tiredness and weakness  · Muscle aches  Who is at risk for flu complications?  For some people, the flu can be very serious. The risk for complications is greater for:  · Children younger than age 5  · Adults ages 65 and older  · People with a chronic illness such as diabetes or heart, kidney, or lung disease  · People who live in a nursing home or long-term care facility   How is the flu treated?  The flu usually gets  better after 7 days or so. In some cases, your healthcare provider may prescribe an antiviral medicine. This may help you get well a little sooner. For the medicine to help, you need to take it as soon as possible (ideally within 48 hours) after your symptoms start. If you develop pneumonia or other serious illness, you may need to stay in the hospital.  Easing flu symptoms  · Drink lots of fluids such as water, juice, and warm soup. A good rule is to drink enough so that you urinate your normal amount.  · Get plenty of rest.  · Ask your healthcare provider what to take for fever and pain.  · Call your provider if your fever is 100.4°F (38°C) or higher, or you become dizzy, lightheaded, or short of breath.  Taking steps to protect others  · Wash your hands often, especially after coughing or sneezing. Or clean your hands with an alcohol-based hand  containing at least 60% alcohol.  · Cough or sneeze into a tissue. Then throw the tissue away and wash your hands. If you dont have a tissue, cough and sneeze into your elbow.  · Stay home until at least 24 hours after you no longer have a fever or chills. Be sure the fever isnt being hidden by fever-reducing medicine.  · Dont share food, utensils, drinking glasses, or a toothbrush with others.  · Ask your healthcare provider if others in your household should get antiviral medicine to help them avoid infection.  How can the flu be prevented?  · One of the best ways to avoid the flu is to get a flu vaccine each year. The virus that causes the flu changes from year to year. For that reason, healthcare providers recommend getting the flu vaccine each year, as soon as it's available in your area. The vaccine is given as a shot. Your healthcare provider can tell you which vaccine is right for you. A nasal spray is also available but is not recommended for the 3073-1121 flu season. The CDC says this is because the nasal spray did not seem to protect against the flu  over the last several flu seasons. In the past, it was meant for people ages 2 to 49.  · Wash your hands often. Frequent handwashing is a proven way to help prevent infection.  · Carry an alcohol-based hand gel containing at least 60% alcohol. Use it when you can't use soap and water. Then wash your hands as soon as you can.  · Avoid touching your eyes, nose, and mouth.  · At home and work, clean phones, computer keyboards, and toys often with disinfectant wipes.  · If possible, avoid close contact with others who have the flu or symptoms of the flu.  Handwashing tips  Handwashing is one of the best ways to prevent many common infections. If you are caring for or visiting someone with the flu, wash your hands each time you enter and leave the room. Follow these steps:  · Use warm water and plenty of soap. Rub your hands together well.  · Clean the whole hand, including under your nails, between your fingers, and up the wrists.  · Wash for at least 15 seconds.  · Rinse, letting the water run down your fingers, not up your wrists.  · Dry your hands well. Use a paper towel to turn off the faucet and open the door.  Using alcohol-based hand   Alcohol-based hand  are also a good choice. Use them when you can't use soap and water. Follow these steps:  · Squeeze about a tablespoon of gel into the palm of one hand.  · Rub your hands together briskly, cleaning the backs of your hands, the palms, between your fingers, and up the wrists.  · Rub until the gel is gone and your hands are completely dry.  Preventing the flu in healthcare settings  The flu is a special concern for people in hospitals and long-term care facilities. To help prevent the spread of flu, many hospitals and nursing homes take these steps:  · Healthcare providers wash their hands or use an alcohol-based hand  before and after treating each patient.  · People with the flu have private rooms and bathrooms or share a room with someone  with the same infection.  · People who are at high risk for the flu but don't have it are encouraged to get the flu and pneumonia vaccines.  · All healthcare workers are encouraged or required to get flu shots.   Date Last Reviewed: 12/1/2016  © 2799-5127 [employer]. [address] [place] [address]. All rights reserved. This information is not intended as a substitute for professional medical care. Always follow your healthcare professional's instructions.

## 2020-04-14 ENCOUNTER — PATIENT MESSAGE (OUTPATIENT)
Dept: DERMATOLOGY | Facility: CLINIC | Age: 20
End: 2020-04-14

## 2020-04-14 ENCOUNTER — TELEPHONE (OUTPATIENT)
Dept: FAMILY MEDICINE | Facility: CLINIC | Age: 20
End: 2020-04-14

## 2020-04-14 NOTE — TELEPHONE ENCOUNTER
----- Message from Trisha Sky sent at 4/14/2020 12:22 PM CDT -----  Contact: PT  Type: Needs Medical Advice    Who Called:  PT  Best Call Back Number: [phone]  Additional Information: Requesting a call back regarding pt would be new but have depression with all of the COVID-19 and wanted to know if the doctor will do virtual visit   Please Advise ---Thank you

## 2020-04-15 ENCOUNTER — OFFICE VISIT (OUTPATIENT)
Dept: DERMATOLOGY | Facility: CLINIC | Age: 20
End: 2020-04-15
Payer: COMMERCIAL

## 2020-04-15 ENCOUNTER — OFFICE VISIT (OUTPATIENT)
Dept: FAMILY MEDICINE | Facility: CLINIC | Age: 20
End: 2020-04-15
Payer: COMMERCIAL

## 2020-04-15 ENCOUNTER — PATIENT MESSAGE (OUTPATIENT)
Dept: DERMATOLOGY | Facility: CLINIC | Age: 20
End: 2020-04-15

## 2020-04-15 ENCOUNTER — TELEPHONE (OUTPATIENT)
Dept: FAMILY MEDICINE | Facility: CLINIC | Age: 20
End: 2020-04-15

## 2020-04-15 ENCOUNTER — PATIENT OUTREACH (OUTPATIENT)
Dept: ADMINISTRATIVE | Facility: HOSPITAL | Age: 20
End: 2020-04-15

## 2020-04-15 DIAGNOSIS — F33.1 MODERATE EPISODE OF RECURRENT MAJOR DEPRESSIVE DISORDER: Primary | ICD-10-CM

## 2020-04-15 DIAGNOSIS — L70.0 ACNE VULGARIS: ICD-10-CM

## 2020-04-15 PROCEDURE — 99203 PR OFFICE/OUTPT VISIT, NEW, LEVL III, 30-44 MIN: ICD-10-PCS | Mod: 95,,, | Performed by: NURSE PRACTITIONER

## 2020-04-15 PROCEDURE — 99203 OFFICE O/P NEW LOW 30 MIN: CPT | Mod: 95,,, | Performed by: NURSE PRACTITIONER

## 2020-04-15 PROCEDURE — 99213 OFFICE O/P EST LOW 20 MIN: CPT | Mod: 95,,, | Performed by: DERMATOLOGY

## 2020-04-15 PROCEDURE — 99213 PR OFFICE/OUTPT VISIT, EST, LEVL III, 20-29 MIN: ICD-10-PCS | Mod: 95,,, | Performed by: DERMATOLOGY

## 2020-04-15 RX ORDER — DOXYCYCLINE 100 MG/1
100 CAPSULE ORAL DAILY
Qty: 30 CAPSULE | Refills: 1 | Status: SHIPPED | OUTPATIENT
Start: 2020-04-15 | End: 2022-09-30 | Stop reason: SDUPTHER

## 2020-04-15 RX ORDER — BUPROPION HYDROCHLORIDE 150 MG/1
150 TABLET ORAL DAILY
Qty: 90 TABLET | Refills: 3 | Status: SHIPPED | OUTPATIENT
Start: 2020-04-15 | End: 2021-01-28

## 2020-04-15 RX ORDER — TAZAROTENE 1 MG/G
CREAM TOPICAL NIGHTLY
Qty: 30 G | Refills: 2 | Status: SHIPPED | OUTPATIENT
Start: 2020-04-15 | End: 2021-01-28

## 2020-04-15 NOTE — TELEPHONE ENCOUNTER
----- Message from Dina Thayer NP sent at 4/15/2020  1:41 PM CDT -----  Virtual visit in 6 weeks for f/u

## 2020-04-15 NOTE — PROGRESS NOTES
Chart review completed 04/15/2020.  Care Everywhere updates requested and reviewed.  Immunizations reconciled. Media reviewed.     Pap smear not in [facility] 04/15/2020

## 2020-04-15 NOTE — PROGRESS NOTES
The patient location is: home  The chief complaint leading to consultation is: depression  Visit type: Virtual visit with synchronous audio and video  Total time spent with patient: 15 mins  Each patient to whom he or she provides medical services by telemedicine is:  (1) informed of the relationship between the physician and patient and the respective role of any other health care provider with respect to management of the patient; and (2) notified that he or she may decline to receive medical services by telemedicine and may withdraw from such care at any time.    HPI: The patient presents today to discuss depression.  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  She has a long history of depression and has been on SSRIs in the past.  She has been speaking with her mother who has use Wellbutrin with very good relief.  She is interested in perhaps trying Wellbutrin if possible.  She does feel like since the COVID pandemic started she has been having issues concentrating and staying on task.  She feels like she is also anxious at times.  She denies any suicidal or homicidal ideations just she feels down and less interested in things that used to make her happy.    Review of Systems   Constitutional: Negative for activity change and appetite change.   HENT: Negative for congestion, postnasal drip, rhinorrhea and sinus pressure.    Eyes: Negative for pain and redness.   Respiratory: Negative for choking and chest tightness.    Gastrointestinal: Negative for abdominal distention, abdominal pain, blood in stool, constipation, diarrhea, nausea and vomiting.   Endocrine: Negative for polydipsia and polyphagia.   Genitourinary: Negative for dysuria and hematuria.   Musculoskeletal: Negative for arthralgias and myalgias.   Skin: Negative for color change and rash.   Neurological: Negative for dizziness and headaches.   Psychiatric/Behavioral: Negative for agitation and behavioral problems.     Physical Exam    Constitutional: The patient is oriented to person, place, and time. He appears well-developed and well-nourished.   HENT: Head: Normocephalic and atraumatic.  Skin: Skin dry.   Psychiatric: Normal mood and affect.    Assess/Plan:  Diagnoses and all orders for this visit:    Moderate episode of recurrent major depressive disorder    Other orders  -     buPROPion (WELLBUTRIN XL) 150 MG TB24 tablet; Take 1 tablet (150 mg total) by mouth once daily.

## 2020-04-15 NOTE — PROGRESS NOTES
The patient location is: home  The chief complaint leading to consultation is: acne  Visit type: audiovisual  Total time spent with patient: 15 min  Each patient to whom he or she provides medical services by telemedicine is:  (1) informed of the relationship between the physician and patient and the respective role of any other health care provider with respect to management of the patient; and (2) notified that he or she may decline to receive medical services by telemedicine and may withdraw from such care at any time.    Subjective:       Patient ID:  Almas Colon is a 20 y.o. female who presents for   Chief Complaint   Patient presents with    Acne   Last OV with Shea Allison NP 11/2018    Acne  - Follow-up  Symptom course: worsening  Affected locations: face, back and neck  Signs / symptoms: redness and tender  Severity: mild    Current Tx: Neutrogena wash    Prior Tx:   doxycycline   spironolactone (made her feel funny)  Tazorac   Aczone (too expensive)  BPO    Regular periods? yes  Does acne flare during menstrual cycle? Just prior to cycle    She also notices flushing when stressed    Review of Systems   Gastrointestinal: Negative for Sensitivity to oral antibiotics.   Genitourinary: Negative for irregular periods.        H/o kidney stones   Skin: Positive for daily sunscreen use and activity-related sunscreen use.   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   Hematologic/Lymphatic: Bruises/bleeds easily.        Objective:    Physical Exam   Constitutional: She appears well-developed and well-nourished. No distress.   HENT:   Mouth/Throat: Lips normal.    Eyes: Lids are normal.    Neurological: She is alert and oriented to person, place, and time. She is not disoriented.   Psychiatric: She has a normal mood and affect.   Skin:   Areas Examined (abnormalities noted in diagram):   Head / Face Inspection Performed  Neck Inspection Performed  Chest / Axilla  Inspection Performed  Back Inspection Performed  RUE Inspected  LUE Inspection Performed                               Diagram Legend     Erythematous scaling macule/papule c/w actinic keratosis       Vascular papule c/w angioma      Pigmented verrucoid papule/plaque c/w seborrheic keratosis      Yellow umbilicated papule c/w sebaceous hyperplasia      Irregularly shaped tan macule c/w lentigo     1-2 mm smooth white papules consistent with Milia      Movable subcutaneous cyst with punctum c/w epidermal inclusion cyst      Subcutaneous movable cyst c/w pilar cyst      Firm pink to brown papule c/w dermatofibroma      Pedunculated fleshy papule(s) c/w skin tag(s)      Evenly pigmented macule c/w junctional nevus     Mildly variegated pigmented, slightly irregular-bordered macule c/w mildly atypical nevus      Flesh colored to evenly pigmented papule c/w intradermal nevus       Pink pearly papule/plaque c/w basal cell carcinoma      Erythematous hyperkeratotic cursted plaque c/w SCC      Surgical scar with no sign of skin cancer recurrence      Open and closed comedones      Inflammatory papules and pustules      Verrucoid papule consistent consistent with wart     Erythematous eczematous patches and plaques     Dystrophic onycholytic nail with subungual debris c/w onychomycosis     Umbilicated papule    Erythematous-base heme-crusted tan verrucoid plaque consistent with inflamed seborrheic keratosis     Erythematous Silvery Scaling Plaque c/w Psoriasis     See annotation      Assessment / Plan:        Acne vulgaris-inflammatory, comedonal; scarring present    Tazorac qhs  Doxycycline 100 mg daily    Discussed benefits and risks of doxycyline therapy including but not limited to GI discomfort, esophageal irritation/ulceration, and increased sun sensitivity. Patient was counseled to take medicine with meals and at least 1 hour before lying down.            Follow up in about 2 months (around 6/15/2020).

## 2020-05-13 ENCOUNTER — PATIENT OUTREACH (OUTPATIENT)
Dept: ADMINISTRATIVE | Facility: HOSPITAL | Age: 20
End: 2020-05-13

## 2020-05-13 NOTE — PROGRESS NOTES
Chart review completed 05/13/2020.  Care Everywhere updates requested and reviewed.  Immunizations reconciled. Media reviewed.     DIS, [facility], and quest reviewed; pt not found in quest, pap smear not found in lab [facility]      Health Maintenance Due   Topic Date Due    Lipid Panel  1998    HIV Screening  03/09/2013    Pap Smear  03/09/2019    TETANUS VACCINE  04/02/2019

## 2020-07-14 ENCOUNTER — PATIENT MESSAGE (OUTPATIENT)
Dept: FAMILY MEDICINE | Facility: CLINIC | Age: 20
End: 2020-07-14

## 2020-07-14 NOTE — LETTER
July 16, 2020    Almas Colon  [address]  [facility] [address]             [facility]  [address] [address] [address]  [facility] [address]  Phone: [phone]  Fax: [phone] To whom it may concern:    Almas Colon is under my care for a mental health condition.  Due to the emotional disability, Mr. Colon has certain limitations related to social interactions, coping with stress, and anxiety.  In order to help alleviate these difficulties and to enhance his ability to function independently.  I am recommending an emotional support animal that will assist Mr. Colon in coping with his disability.  The presence of this animal is necessary for Mr. Colon's emotional health.  If you have any questions, please do not hesitate to call the office at [phone].        Thank you,    RAMBO Frankel LPN

## 2020-07-17 ENCOUNTER — TELEPHONE (OUTPATIENT)
Dept: FAMILY MEDICINE | Facility: CLINIC | Age: 20
End: 2020-07-17

## 2020-07-17 NOTE — TELEPHONE ENCOUNTER
----- Message from Farideh Maya sent at 7/16/2020  4:12 PM CDT -----  Regarding: support aminal letter  Type: Needs Medical Advice  Who Called:  pt  Symptoms (please be specific):    How long has patient had these symptoms:    Pharmacy name and phone #:    Best Call Back Number: [phone] (home)     Additional Information: pt would like to know when she can  the letter for her support animal. Pls call to advise

## 2020-07-17 NOTE — TELEPHONE ENCOUNTER
LM to notify patient letter is now accessible through patient portal. Included contact information for return call to clinic with any additional questions/concerns.

## 2020-07-18 ENCOUNTER — PATIENT MESSAGE (OUTPATIENT)
Dept: FAMILY MEDICINE | Facility: CLINIC | Age: 20
End: 2020-07-18

## 2020-09-10 ENCOUNTER — PATIENT MESSAGE (OUTPATIENT)
Dept: FAMILY MEDICINE | Facility: CLINIC | Age: 20
End: 2020-09-10

## 2020-09-11 ENCOUNTER — OFFICE VISIT (OUTPATIENT)
Dept: FAMILY MEDICINE | Facility: CLINIC | Age: 20
End: 2020-09-11
Payer: COMMERCIAL

## 2020-09-11 DIAGNOSIS — F90.9 ATTENTION DEFICIT HYPERACTIVITY DISORDER (ADHD), UNSPECIFIED ADHD TYPE: Primary | ICD-10-CM

## 2020-09-11 PROCEDURE — 99213 OFFICE O/P EST LOW 20 MIN: CPT | Mod: 95,,, | Performed by: NURSE PRACTITIONER

## 2020-09-11 PROCEDURE — 99213 PR OFFICE/OUTPT VISIT, EST, LEVL III, 20-29 MIN: ICD-10-PCS | Mod: 95,,, | Performed by: NURSE PRACTITIONER

## 2020-09-11 RX ORDER — LISDEXAMFETAMINE DIMESYLATE CAPSULES 20 MG/1
20 CAPSULE ORAL EVERY MORNING
Qty: 30 CAPSULE | Refills: 0 | Status: SHIPPED | OUTPATIENT
Start: 2020-09-11 | End: 2020-10-15 | Stop reason: SDUPTHER

## 2020-09-11 NOTE — PROGRESS NOTES
The patient location is: home  The chief complaint leading to consultation is: ADHD  Visit type: audiovisual  Total time spent with patient: 10 mins  Each patient to whom he or she provides medical services by telemedicine is:  (1) informed of the relationship between the physician and patient and the respective role of any other health care provider with respect to management of the patient; and (2) notified that he or she may decline to receive medical services by telemedicine and may withdraw from such care at any time.    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 She is having a very hard time concentrating, especially with remote learning during covid pandemic.   Patient was on adderall in high school (diagnosed at that time, peds and psych). She did well on the medication. She did not have any side effects.    Review of Systems   Constitutional: Negative for activity change and appetite change.   HENT: Negative for congestion, postnasal drip, rhinorrhea and sinus pressure.    Eyes: Negative for pain and redness.   Respiratory: Negative for choking and chest tightness.    Gastrointestinal: Negative for abdominal distention, abdominal pain, blood in stool, constipation, diarrhea, nausea and vomiting.   Endocrine: Negative for polydipsia and polyphagia.   Genitourinary: Negative for dysuria and hematuria.   Musculoskeletal: Negative for arthralgias and myalgias.   Skin: Negative for color change and rash.   Neurological: Negative for dizziness and headaches.   Psychiatric/Behavioral: Negative for agitation and behavioral problems.     Physical Exam   Constitutional: The patient is oriented to person, place, and time. He appears well-developed and well-nourished.   HENT: Head: Normocephalic and atraumatic.  Skin: Skin dry.   Psychiatric: Normal mood and affect.    Assess/Plan:    Diagnoses and all orders for this visit:    Attention deficit hyperactivity disorder (ADHD), unspecified ADHD  type    Other orders  -     lisdexamfetamine (VYVANSE) 20 MG capsule; Take 1 capsule (20 mg total) by mouth every morning.            Answers for HPI/ROS submitted by the patient on 9/11/2020   activity change: No  unexpected weight change: No  neck pain: No  hearing loss: No  rhinorrhea: No  trouble swallowing: No  eye discharge: No  visual disturbance: No  chest tightness: No  wheezing: No  chest pain: No  palpitations: No  blood in stool: No  constipation: No  vomiting: No  diarrhea: No  polydipsia: No  polyuria: No  difficulty urinating: No  hematuria: No  menstrual problem: No  dysuria: No  joint swelling: No  arthralgias: No  headaches: No  weakness: No  confusion: No  dysphoric mood: Yes

## 2020-10-15 ENCOUNTER — OFFICE VISIT (OUTPATIENT)
Dept: FAMILY MEDICINE | Facility: CLINIC | Age: 20
End: 2020-10-15
Payer: COMMERCIAL

## 2020-10-15 DIAGNOSIS — F90.9 ATTENTION DEFICIT HYPERACTIVITY DISORDER (ADHD), UNSPECIFIED ADHD TYPE: Primary | ICD-10-CM

## 2020-10-15 PROCEDURE — 99213 PR OFFICE/OUTPT VISIT, EST, LEVL III, 20-29 MIN: ICD-10-PCS | Mod: 95,,, | Performed by: NURSE PRACTITIONER

## 2020-10-15 PROCEDURE — 99213 OFFICE O/P EST LOW 20 MIN: CPT | Mod: 95,,, | Performed by: NURSE PRACTITIONER

## 2020-10-15 RX ORDER — LISDEXAMFETAMINE DIMESYLATE CAPSULES 20 MG/1
20 CAPSULE ORAL EVERY MORNING
Qty: 30 CAPSULE | Refills: 0 | Status: SHIPPED | OUTPATIENT
Start: 2020-12-15 | End: 2021-01-28

## 2020-10-15 RX ORDER — LISDEXAMFETAMINE DIMESYLATE CAPSULES 20 MG/1
20 CAPSULE ORAL EVERY MORNING
Qty: 30 CAPSULE | Refills: 0 | Status: SHIPPED | OUTPATIENT
Start: 2020-10-15 | End: 2021-01-28

## 2020-10-15 RX ORDER — LISDEXAMFETAMINE DIMESYLATE CAPSULES 20 MG/1
20 CAPSULE ORAL EVERY MORNING
Qty: 30 CAPSULE | Refills: 0 | Status: SHIPPED | OUTPATIENT
Start: 2020-11-15 | End: 2021-01-28

## 2020-10-16 NOTE — PROGRESS NOTES
The patient location is: home  The chief complaint leading to consultation is: ADHD follow up   Visit type: Virtual visit with synchronous audio and video  Total time spent with patient: 10 mins   Each patient to whom he or she provides medical services by telemedicine is:  (1) informed of the relationship between the physician and patient and the respective role of any other health care provider with respect to management of the patient; and (2) notified that he or she may decline to receive medical services by telemedicine and may withdraw from such care at any time.    HPI:  The patient is here for any ADHD follow-up visit.  She is currently in her 50 year binge nearing and recently we started her on Vyvanse 20 mg daily.  She tells me that the medication is working extremely well.  She has had no side effects and has improved her focus and concentration.  She has been able to complete tasks on time and she is happy with the way she feels.  She does not have any other complaints today.  Her weight is stable.    Review of Systems   Constitutional: Negative for activity change and appetite change.   HENT: Negative for congestion, postnasal drip, rhinorrhea and sinus pressure.    Eyes: Negative for pain and redness.   Respiratory: Negative for choking and chest tightness.    Gastrointestinal: Negative for abdominal distention, abdominal pain, blood in stool, constipation, diarrhea, nausea and vomiting.   Endocrine: Negative for polydipsia and polyphagia.   Genitourinary: Negative for dysuria and hematuria.   Musculoskeletal: Negative for arthralgias and myalgias.   Skin: Negative for color change and rash.   Neurological: Negative for dizziness and headaches.   Psychiatric/Behavioral: Negative for agitation and behavioral problems.     Physical Exam   Constitutional: The patient is oriented to person, place, and time. He appears well-developed and well-nourished.   HENT: Head: Normocephalic and atraumatic.  Skin:  Skin dry.   Psychiatric: Normal mood and affect.    Assess/Plan:  Diagnoses and all orders for this visit:    Attention deficit hyperactivity disorder (ADHD), unspecified ADHD type    Other orders  -     lisdexamfetamine (VYVANSE) 20 MG capsule; Take 1 capsule (20 mg total) by mouth every morning.  -     lisdexamfetamine (VYVANSE) 20 MG capsule; Take 1 capsule (20 mg total) by mouth every morning.  -     lisdexamfetamine (VYVANSE) 20 MG capsule; Take 1 capsule (20 mg total) by mouth every morning.          Answers for HPI/ROS submitted by the patient on 10/15/2020   activity change: No  unexpected weight change: No  neck pain: No  hearing loss: No  rhinorrhea: No  trouble swallowing: No  eye discharge: No  visual disturbance: No  chest tightness: No  wheezing: No  chest pain: No  palpitations: No  blood in stool: No  constipation: No  vomiting: No  diarrhea: No  polydipsia: No  polyuria: No  difficulty urinating: No  hematuria: No  menstrual problem: No  dysuria: No  joint swelling: No  arthralgias: No  headaches: No  weakness: No  confusion: No  dysphoric mood: No

## 2020-11-27 ENCOUNTER — PATIENT MESSAGE (OUTPATIENT)
Dept: FAMILY MEDICINE | Facility: CLINIC | Age: 20
End: 2020-11-27

## 2020-12-17 ENCOUNTER — TELEPHONE (OUTPATIENT)
Dept: FAMILY MEDICINE | Facility: CLINIC | Age: 20
End: 2020-12-17

## 2020-12-17 DIAGNOSIS — F33.1 MODERATE EPISODE OF RECURRENT MAJOR DEPRESSIVE DISORDER: Primary | ICD-10-CM

## 2020-12-17 DIAGNOSIS — Z30.9 ENCOUNTER FOR CONTRACEPTIVE MANAGEMENT, UNSPECIFIED TYPE: ICD-10-CM

## 2020-12-17 NOTE — TELEPHONE ENCOUNTER
----- Message from Bailey Justice sent at 12/17/2020 11:19 AM CST -----  Regarding: referral  Contact: motherLivia  Type:  Patient Requesting Referral    Who Called:  Livia veloz  Does the patient already have the specialty appointment scheduled?:  no  If yes, what is the date of that appointment?:  no  Referral to What Specialty:  Psychiatry and OBGYN  Reason for Referral:  depression and anxiety // birthcontrol  Does the patient want the referral with a specific physician?:  no  [facility]  Patient Requesting a Call Back?:  yes  Best Call Back Number:  [phone] (home)   Additional Information:   Please call mother to advise. Thanks!

## 2020-12-18 NOTE — TELEPHONE ENCOUNTER
Patient's mother Livia notified, verbalized understanding and will schedule appt at earliest convenience

## 2021-01-20 ENCOUNTER — OFFICE VISIT (OUTPATIENT)
Dept: PSYCHIATRY | Facility: CLINIC | Age: 21
End: 2021-01-20
Payer: COMMERCIAL

## 2021-01-20 DIAGNOSIS — F43.23 ADJUSTMENT DISORDER WITH MIXED ANXIETY AND DEPRESSED MOOD: Primary | ICD-10-CM

## 2021-01-20 PROCEDURE — 90791 PR PSYCHIATRIC DIAGNOSTIC EVALUATION: ICD-10-PCS | Mod: S$GLB,,, | Performed by: SOCIAL WORKER

## 2021-01-20 PROCEDURE — 99999 PR PBB SHADOW E&M-EST. PATIENT-LVL III: ICD-10-PCS | Mod: PBBFAC,,, | Performed by: SOCIAL WORKER

## 2021-01-20 PROCEDURE — 99999 PR PBB SHADOW E&M-EST. PATIENT-LVL III: CPT | Mod: PBBFAC,,, | Performed by: SOCIAL WORKER

## 2021-01-20 PROCEDURE — 90791 PSYCH DIAGNOSTIC EVALUATION: CPT | Mod: S$GLB,,, | Performed by: SOCIAL WORKER

## 2021-01-26 ENCOUNTER — PATIENT MESSAGE (OUTPATIENT)
Dept: PSYCHIATRY | Facility: CLINIC | Age: 21
End: 2021-01-26

## 2021-01-28 ENCOUNTER — OFFICE VISIT (OUTPATIENT)
Dept: PSYCHIATRY | Facility: CLINIC | Age: 21
End: 2021-01-28
Payer: COMMERCIAL

## 2021-01-28 VITALS
DIASTOLIC BLOOD PRESSURE: 88 MMHG | WEIGHT: 155 LBS | BODY MASS INDEX: 26.46 KG/M2 | HEART RATE: 101 BPM | TEMPERATURE: 99 F | SYSTOLIC BLOOD PRESSURE: 127 MMHG | HEIGHT: 64 IN

## 2021-01-28 DIAGNOSIS — F90.2 ATTENTION DEFICIT HYPERACTIVITY DISORDER (ADHD), COMBINED TYPE: Primary | ICD-10-CM

## 2021-01-28 DIAGNOSIS — F32.A DEPRESSIVE DISORDER: ICD-10-CM

## 2021-01-28 PROCEDURE — 80307 DRUG TEST PRSMV CHEM ANLYZR: CPT

## 2021-01-28 PROCEDURE — 90792 PR PSYCHIATRIC DIAGNOSTIC EVALUATION W/MEDICAL SERVICES: ICD-10-PCS | Mod: S$GLB,,, | Performed by: PHYSICIAN ASSISTANT

## 2021-01-28 PROCEDURE — 3008F BODY MASS INDEX DOCD: CPT | Mod: CPTII,S$GLB,, | Performed by: PHYSICIAN ASSISTANT

## 2021-01-28 PROCEDURE — 99999 PR PBB SHADOW E&M-EST. PATIENT-LVL III: CPT | Mod: PBBFAC,,, | Performed by: PHYSICIAN ASSISTANT

## 2021-01-28 PROCEDURE — 3008F PR BODY MASS INDEX (BMI) DOCUMENTED: ICD-10-PCS | Mod: CPTII,S$GLB,, | Performed by: PHYSICIAN ASSISTANT

## 2021-01-28 PROCEDURE — 99999 PR PBB SHADOW E&M-EST. PATIENT-LVL III: ICD-10-PCS | Mod: PBBFAC,,, | Performed by: PHYSICIAN ASSISTANT

## 2021-01-28 PROCEDURE — 1126F PR PAIN SEVERITY QUANTIFIED, NO PAIN PRESENT: ICD-10-PCS | Mod: S$GLB,,, | Performed by: PHYSICIAN ASSISTANT

## 2021-01-28 PROCEDURE — 1126F AMNT PAIN NOTED NONE PRSNT: CPT | Mod: S$GLB,,, | Performed by: PHYSICIAN ASSISTANT

## 2021-01-28 PROCEDURE — 90792 PSYCH DIAG EVAL W/MED SRVCS: CPT | Mod: S$GLB,,, | Performed by: PHYSICIAN ASSISTANT

## 2021-01-28 RX ORDER — PLANT STANOL ESTER 450 MG
8000 TABLET ORAL DAILY
COMMUNITY
End: 2021-04-21

## 2021-01-28 RX ORDER — ZINC GLUCONATE 50 MG
50 TABLET ORAL DAILY
COMMUNITY

## 2021-01-28 RX ORDER — VITAMIN B COMPLEX
1 CAPSULE ORAL DAILY
COMMUNITY

## 2021-01-28 RX ORDER — IBUPROFEN 100 MG/5ML
1000 SUSPENSION, ORAL (FINAL DOSE FORM) ORAL DAILY
COMMUNITY

## 2021-01-28 RX ORDER — ERGOCALCIFEROL 1.25 MG/1
50000 CAPSULE ORAL
COMMUNITY

## 2021-01-29 LAB
AMPHET+METHAMPHET UR QL: NEGATIVE
BARBITURATES UR QL SCN>200 NG/ML: NEGATIVE
BENZODIAZ UR QL SCN>200 NG/ML: NEGATIVE
BZE UR QL SCN: NEGATIVE
CANNABINOIDS UR QL SCN: NEGATIVE
CREAT UR-MCNC: 31 MG/DL (ref 15–325)
ETHANOL UR-MCNC: <10 MG/DL
METHADONE UR QL SCN>300 NG/ML: NEGATIVE
OPIATES UR QL SCN: NEGATIVE
PCP UR QL SCN>25 NG/ML: NEGATIVE
TOXICOLOGY INFORMATION: NORMAL

## 2021-01-29 RX ORDER — LISDEXAMFETAMINE DIMESYLATE CAPSULES 20 MG/1
20 CAPSULE ORAL EVERY MORNING
Start: 2021-01-29 | End: 2021-02-12 | Stop reason: SDUPTHER

## 2021-02-01 ENCOUNTER — OFFICE VISIT (OUTPATIENT)
Dept: PSYCHIATRY | Facility: CLINIC | Age: 21
End: 2021-02-01
Payer: COMMERCIAL

## 2021-02-01 DIAGNOSIS — R69 DIAGNOSIS DEFERRED: Primary | ICD-10-CM

## 2021-02-01 PROCEDURE — 99999 PR PBB SHADOW E&M-EST. PATIENT-LVL III: ICD-10-PCS | Mod: PBBFAC,,, | Performed by: SOCIAL WORKER

## 2021-02-01 PROCEDURE — 99999 PR PBB SHADOW E&M-EST. PATIENT-LVL III: CPT | Mod: PBBFAC,,, | Performed by: SOCIAL WORKER

## 2021-02-01 PROCEDURE — 90834 PR PSYCHOTHERAPY W/PATIENT, 45 MIN: ICD-10-PCS | Mod: S$GLB,,, | Performed by: SOCIAL WORKER

## 2021-02-01 PROCEDURE — 90834 PSYTX W PT 45 MINUTES: CPT | Mod: S$GLB,,, | Performed by: SOCIAL WORKER

## 2021-02-12 DIAGNOSIS — F90.2 ATTENTION DEFICIT HYPERACTIVITY DISORDER (ADHD), COMBINED TYPE: ICD-10-CM

## 2021-02-12 RX ORDER — LISDEXAMFETAMINE DIMESYLATE CAPSULES 20 MG/1
20 CAPSULE ORAL EVERY MORNING
Qty: 30 CAPSULE | Refills: 0 | Status: SHIPPED | OUTPATIENT
Start: 2021-02-12 | End: 2021-04-05 | Stop reason: SDUPTHER

## 2021-02-19 ENCOUNTER — OFFICE VISIT (OUTPATIENT)
Dept: PSYCHIATRY | Facility: CLINIC | Age: 21
End: 2021-02-19
Payer: COMMERCIAL

## 2021-02-19 DIAGNOSIS — R69 DIAGNOSIS DEFERRED: Primary | ICD-10-CM

## 2021-02-19 PROCEDURE — 90834 PR PSYCHOTHERAPY W/PATIENT, 45 MIN: ICD-10-PCS | Mod: 95,,, | Performed by: SOCIAL WORKER

## 2021-02-19 PROCEDURE — 90834 PSYTX W PT 45 MINUTES: CPT | Mod: 95,,, | Performed by: SOCIAL WORKER

## 2021-03-03 ENCOUNTER — HOSPITAL ENCOUNTER (EMERGENCY)
Facility: HOSPITAL | Age: 21
Discharge: HOME | End: 2021-03-03
Attending: EMERGENCY MEDICINE
Payer: COMMERCIAL

## 2021-03-03 VITALS
OXYGEN SATURATION: 98 % | WEIGHT: 145 LBS | RESPIRATION RATE: 22 BRPM | DIASTOLIC BLOOD PRESSURE: 78 MMHG | SYSTOLIC BLOOD PRESSURE: 135 MMHG | HEART RATE: 114 BPM | HEIGHT: 64 IN | BODY MASS INDEX: 24.75 KG/M2 | TEMPERATURE: 99 F

## 2021-03-03 DIAGNOSIS — J02.9 VIRAL PHARYNGITIS: Primary | ICD-10-CM

## 2021-03-03 LAB
GROUP A STREP, MOLECULAR: NEGATIVE
INFLUENZA A, MOLECULAR: NEGATIVE
INFLUENZA B, MOLECULAR: NEGATIVE
SPECIMEN SOURCE: NORMAL

## 2021-03-03 PROCEDURE — 87502 INFLUENZA DNA AMP PROBE: CPT | Performed by: EMERGENCY MEDICINE

## 2021-03-03 PROCEDURE — 96374 THER/PROPH/DIAG INJ IV PUSH: CPT

## 2021-03-03 PROCEDURE — U0005 INFEC AGEN DETEC AMPLI PROBE: HCPCS | Performed by: EMERGENCY MEDICINE

## 2021-03-03 PROCEDURE — 63600175 PHARM REV CODE 636 W HCPCS: Performed by: EMERGENCY MEDICINE

## 2021-03-03 PROCEDURE — 99284 EMERGENCY DEPT VISIT MOD MDM: CPT | Mod: 25

## 2021-03-03 PROCEDURE — 96361 HYDRATE IV INFUSION ADD-ON: CPT

## 2021-03-03 PROCEDURE — U0003 INFECTIOUS AGENT DETECTION BY NUCLEIC ACID (DNA OR RNA); SEVERE ACUTE RESPIRATORY SYNDROME CORONAVIRUS 2 (SARS-COV-2) (CORONAVIRUS DISEASE [COVID-19]), AMPLIFIED PROBE TECHNIQUE, MAKING USE OF HIGH THROUGHPUT TECHNOLOGIES AS DESCRIBED BY CMS-2020-01-R: HCPCS | Performed by: EMERGENCY MEDICINE

## 2021-03-03 PROCEDURE — 25000003 PHARM REV CODE 250: Performed by: EMERGENCY MEDICINE

## 2021-03-03 PROCEDURE — 87651 STREP A DNA AMP PROBE: CPT | Performed by: EMERGENCY MEDICINE

## 2021-03-03 RX ORDER — KETOROLAC TROMETHAMINE 30 MG/ML
15 INJECTION, SOLUTION INTRAMUSCULAR; INTRAVENOUS
Status: COMPLETED | OUTPATIENT
Start: 2021-03-03 | End: 2021-03-03

## 2021-03-03 RX ADMIN — SODIUM CHLORIDE 1000 ML: 0.9 INJECTION, SOLUTION INTRAVENOUS at 10:03

## 2021-03-03 RX ADMIN — KETOROLAC TROMETHAMINE 15 MG: 30 INJECTION, SOLUTION INTRAMUSCULAR at 10:03

## 2021-03-04 LAB — SARS-COV-2 RNA RESP QL NAA+PROBE: NOT DETECTED

## 2021-03-24 ENCOUNTER — OFFICE VISIT (OUTPATIENT)
Dept: PSYCHIATRY | Facility: CLINIC | Age: 21
End: 2021-03-24
Payer: COMMERCIAL

## 2021-03-24 VITALS
SYSTOLIC BLOOD PRESSURE: 120 MMHG | BODY MASS INDEX: 27.43 KG/M2 | WEIGHT: 160.69 LBS | TEMPERATURE: 99 F | HEIGHT: 64 IN | HEART RATE: 115 BPM | DIASTOLIC BLOOD PRESSURE: 85 MMHG

## 2021-03-24 DIAGNOSIS — F43.23 ADJUSTMENT DISORDER WITH MIXED ANXIETY AND DEPRESSED MOOD: ICD-10-CM

## 2021-03-24 DIAGNOSIS — F32.A DEPRESSIVE DISORDER: Primary | ICD-10-CM

## 2021-03-24 PROCEDURE — 1125F PR PAIN SEVERITY QUANTIFIED, PAIN PRESENT: ICD-10-PCS | Mod: S$GLB,,, | Performed by: PHYSICIAN ASSISTANT

## 2021-03-24 PROCEDURE — 1125F AMNT PAIN NOTED PAIN PRSNT: CPT | Mod: S$GLB,,, | Performed by: PHYSICIAN ASSISTANT

## 2021-03-24 PROCEDURE — 99999 PR PBB SHADOW E&M-EST. PATIENT-LVL IV: CPT | Mod: PBBFAC,,, | Performed by: PHYSICIAN ASSISTANT

## 2021-03-24 PROCEDURE — 99214 OFFICE O/P EST MOD 30 MIN: CPT | Mod: S$GLB,,, | Performed by: PHYSICIAN ASSISTANT

## 2021-03-24 PROCEDURE — 99999 PR PBB SHADOW E&M-EST. PATIENT-LVL IV: ICD-10-PCS | Mod: PBBFAC,,, | Performed by: PHYSICIAN ASSISTANT

## 2021-03-24 PROCEDURE — 3008F BODY MASS INDEX DOCD: CPT | Mod: CPTII,S$GLB,, | Performed by: PHYSICIAN ASSISTANT

## 2021-03-24 PROCEDURE — 99214 PR OFFICE/OUTPT VISIT, EST, LEVL IV, 30-39 MIN: ICD-10-PCS | Mod: S$GLB,,, | Performed by: PHYSICIAN ASSISTANT

## 2021-03-24 PROCEDURE — 3008F PR BODY MASS INDEX (BMI) DOCUMENTED: ICD-10-PCS | Mod: CPTII,S$GLB,, | Performed by: PHYSICIAN ASSISTANT

## 2021-03-24 RX ORDER — ESCITALOPRAM OXALATE 5 MG/1
5 TABLET ORAL DAILY
Qty: 30 TABLET | Refills: 1 | Status: SHIPPED | OUTPATIENT
Start: 2021-03-24 | End: 2021-04-16

## 2021-04-05 DIAGNOSIS — F90.2 ATTENTION DEFICIT HYPERACTIVITY DISORDER (ADHD), COMBINED TYPE: ICD-10-CM

## 2021-04-05 RX ORDER — LISDEXAMFETAMINE DIMESYLATE CAPSULES 20 MG/1
20 CAPSULE ORAL EVERY MORNING
Qty: 30 CAPSULE | Refills: 0 | Status: SHIPPED | OUTPATIENT
Start: 2021-04-05 | End: 2021-05-10 | Stop reason: SDUPTHER

## 2021-04-19 ENCOUNTER — TELEPHONE (OUTPATIENT)
Dept: PSYCHIATRY | Facility: CLINIC | Age: 21
End: 2021-04-19

## 2021-04-21 ENCOUNTER — OFFICE VISIT (OUTPATIENT)
Dept: PSYCHIATRY | Facility: CLINIC | Age: 21
End: 2021-04-21
Payer: COMMERCIAL

## 2021-04-21 VITALS
DIASTOLIC BLOOD PRESSURE: 88 MMHG | BODY MASS INDEX: 28 KG/M2 | HEIGHT: 64 IN | HEART RATE: 100 BPM | WEIGHT: 164 LBS | SYSTOLIC BLOOD PRESSURE: 126 MMHG

## 2021-04-21 DIAGNOSIS — F90.2 ATTENTION DEFICIT HYPERACTIVITY DISORDER (ADHD), COMBINED TYPE: Primary | ICD-10-CM

## 2021-04-21 DIAGNOSIS — F43.23 ADJUSTMENT DISORDER WITH MIXED ANXIETY AND DEPRESSED MOOD: ICD-10-CM

## 2021-04-21 PROCEDURE — 99214 OFFICE O/P EST MOD 30 MIN: CPT | Mod: S$GLB,,, | Performed by: PHYSICIAN ASSISTANT

## 2021-04-21 PROCEDURE — 3008F PR BODY MASS INDEX (BMI) DOCUMENTED: ICD-10-PCS | Mod: CPTII,S$GLB,, | Performed by: PHYSICIAN ASSISTANT

## 2021-04-21 PROCEDURE — 1126F PR PAIN SEVERITY QUANTIFIED, NO PAIN PRESENT: ICD-10-PCS | Mod: S$GLB,,, | Performed by: PHYSICIAN ASSISTANT

## 2021-04-21 PROCEDURE — 99214 PR OFFICE/OUTPT VISIT, EST, LEVL IV, 30-39 MIN: ICD-10-PCS | Mod: S$GLB,,, | Performed by: PHYSICIAN ASSISTANT

## 2021-04-21 PROCEDURE — 99999 PR PBB SHADOW E&M-EST. PATIENT-LVL IV: ICD-10-PCS | Mod: PBBFAC,,, | Performed by: PHYSICIAN ASSISTANT

## 2021-04-21 PROCEDURE — 1126F AMNT PAIN NOTED NONE PRSNT: CPT | Mod: S$GLB,,, | Performed by: PHYSICIAN ASSISTANT

## 2021-04-21 PROCEDURE — 99999 PR PBB SHADOW E&M-EST. PATIENT-LVL IV: CPT | Mod: PBBFAC,,, | Performed by: PHYSICIAN ASSISTANT

## 2021-04-21 PROCEDURE — 3008F BODY MASS INDEX DOCD: CPT | Mod: CPTII,S$GLB,, | Performed by: PHYSICIAN ASSISTANT

## 2021-05-10 ENCOUNTER — PATIENT MESSAGE (OUTPATIENT)
Dept: RESEARCH | Facility: HOSPITAL | Age: 21
End: 2021-05-10

## 2021-05-10 DIAGNOSIS — F90.2 ATTENTION DEFICIT HYPERACTIVITY DISORDER (ADHD), COMBINED TYPE: ICD-10-CM

## 2021-05-10 RX ORDER — LISDEXAMFETAMINE DIMESYLATE CAPSULES 20 MG/1
20 CAPSULE ORAL EVERY MORNING
Qty: 30 CAPSULE | Refills: 0 | Status: SHIPPED | OUTPATIENT
Start: 2021-05-10 | End: 2021-07-12 | Stop reason: SDUPTHER

## 2021-06-03 ENCOUNTER — OFFICE VISIT (OUTPATIENT)
Dept: PSYCHIATRY | Facility: CLINIC | Age: 21
End: 2021-06-03
Payer: COMMERCIAL

## 2021-06-03 DIAGNOSIS — R69 DIAGNOSIS DEFERRED: Primary | ICD-10-CM

## 2021-06-03 PROCEDURE — 99999 PR PBB SHADOW E&M-EST. PATIENT-LVL II: ICD-10-PCS | Mod: PBBFAC,,, | Performed by: SOCIAL WORKER

## 2021-06-03 PROCEDURE — 90834 PSYTX W PT 45 MINUTES: CPT | Mod: S$GLB,,, | Performed by: SOCIAL WORKER

## 2021-06-03 PROCEDURE — 99999 PR PBB SHADOW E&M-EST. PATIENT-LVL II: CPT | Mod: PBBFAC,,, | Performed by: SOCIAL WORKER

## 2021-06-03 PROCEDURE — 90834 PR PSYCHOTHERAPY W/PATIENT, 45 MIN: ICD-10-PCS | Mod: S$GLB,,, | Performed by: SOCIAL WORKER

## 2021-07-12 DIAGNOSIS — F90.2 ATTENTION DEFICIT HYPERACTIVITY DISORDER (ADHD), COMBINED TYPE: ICD-10-CM

## 2021-07-13 RX ORDER — LISDEXAMFETAMINE DIMESYLATE CAPSULES 20 MG/1
20 CAPSULE ORAL EVERY MORNING
Qty: 30 CAPSULE | Refills: 0 | Status: SHIPPED | OUTPATIENT
Start: 2021-07-13 | End: 2021-10-04 | Stop reason: SDUPTHER

## 2021-07-21 ENCOUNTER — OFFICE VISIT (OUTPATIENT)
Dept: PSYCHIATRY | Facility: CLINIC | Age: 21
End: 2021-07-21
Payer: COMMERCIAL

## 2021-07-21 VITALS
DIASTOLIC BLOOD PRESSURE: 83 MMHG | HEART RATE: 81 BPM | BODY MASS INDEX: 28.24 KG/M2 | HEIGHT: 64 IN | WEIGHT: 165.38 LBS | SYSTOLIC BLOOD PRESSURE: 126 MMHG

## 2021-07-21 DIAGNOSIS — F90.2 ATTENTION DEFICIT HYPERACTIVITY DISORDER (ADHD), COMBINED TYPE: ICD-10-CM

## 2021-07-21 DIAGNOSIS — F43.23 ADJUSTMENT DISORDER WITH MIXED ANXIETY AND DEPRESSED MOOD: Primary | ICD-10-CM

## 2021-07-21 PROCEDURE — 99999 PR PBB SHADOW E&M-EST. PATIENT-LVL IV: CPT | Mod: PBBFAC,,, | Performed by: PHYSICIAN ASSISTANT

## 2021-07-21 PROCEDURE — 99214 OFFICE O/P EST MOD 30 MIN: CPT | Mod: S$GLB,,, | Performed by: PHYSICIAN ASSISTANT

## 2021-07-21 PROCEDURE — 99214 PR OFFICE/OUTPT VISIT, EST, LEVL IV, 30-39 MIN: ICD-10-PCS | Mod: S$GLB,,, | Performed by: PHYSICIAN ASSISTANT

## 2021-07-21 PROCEDURE — 1126F AMNT PAIN NOTED NONE PRSNT: CPT | Mod: CPTII,S$GLB,, | Performed by: PHYSICIAN ASSISTANT

## 2021-07-21 PROCEDURE — 3008F BODY MASS INDEX DOCD: CPT | Mod: CPTII,S$GLB,, | Performed by: PHYSICIAN ASSISTANT

## 2021-07-21 PROCEDURE — 99999 PR PBB SHADOW E&M-EST. PATIENT-LVL IV: ICD-10-PCS | Mod: PBBFAC,,, | Performed by: PHYSICIAN ASSISTANT

## 2021-07-21 PROCEDURE — 3008F PR BODY MASS INDEX (BMI) DOCUMENTED: ICD-10-PCS | Mod: CPTII,S$GLB,, | Performed by: PHYSICIAN ASSISTANT

## 2021-07-21 PROCEDURE — 1126F PR PAIN SEVERITY QUANTIFIED, NO PAIN PRESENT: ICD-10-PCS | Mod: CPTII,S$GLB,, | Performed by: PHYSICIAN ASSISTANT

## 2021-07-21 RX ORDER — SERTRALINE HYDROCHLORIDE 50 MG/1
50 TABLET, FILM COATED ORAL DAILY
Qty: 30 TABLET | Refills: 3 | Status: SHIPPED | OUTPATIENT
Start: 2021-07-21 | End: 2021-10-07 | Stop reason: SDUPTHER

## 2021-07-28 ENCOUNTER — OFFICE VISIT (OUTPATIENT)
Dept: URGENT CARE | Facility: CLINIC | Age: 21
End: 2021-07-28
Payer: COMMERCIAL

## 2021-07-28 VITALS
DIASTOLIC BLOOD PRESSURE: 86 MMHG | RESPIRATION RATE: 16 BRPM | WEIGHT: 165 LBS | SYSTOLIC BLOOD PRESSURE: 129 MMHG | BODY MASS INDEX: 28.32 KG/M2 | OXYGEN SATURATION: 99 % | TEMPERATURE: 100 F | HEART RATE: 114 BPM

## 2021-07-28 DIAGNOSIS — R05.9 COUGH: Primary | ICD-10-CM

## 2021-07-28 DIAGNOSIS — U07.1 COVID-19 VIRUS DETECTED: ICD-10-CM

## 2021-07-28 DIAGNOSIS — U07.1 COVID-19: ICD-10-CM

## 2021-07-28 LAB
CTP QC/QA: YES
SARS-COV-2 RDRP RESP QL NAA+PROBE: POSITIVE

## 2021-07-28 PROCEDURE — 1159F PR MEDICATION LIST DOCUMENTED IN MEDICAL RECORD: ICD-10-PCS | Mod: CPTII,S$GLB,, | Performed by: FAMILY MEDICINE

## 2021-07-28 PROCEDURE — U0002: ICD-10-PCS | Mod: QW,S$GLB,, | Performed by: FAMILY MEDICINE

## 2021-07-28 PROCEDURE — 3008F PR BODY MASS INDEX (BMI) DOCUMENTED: ICD-10-PCS | Mod: CPTII,S$GLB,, | Performed by: FAMILY MEDICINE

## 2021-07-28 PROCEDURE — 1160F PR REVIEW ALL MEDS BY PRESCRIBER/CLIN PHARMACIST DOCUMENTED: ICD-10-PCS | Mod: CPTII,S$GLB,, | Performed by: FAMILY MEDICINE

## 2021-07-28 PROCEDURE — 1160F RVW MEDS BY RX/DR IN RCRD: CPT | Mod: CPTII,S$GLB,, | Performed by: FAMILY MEDICINE

## 2021-07-28 PROCEDURE — 3008F BODY MASS INDEX DOCD: CPT | Mod: CPTII,S$GLB,, | Performed by: FAMILY MEDICINE

## 2021-07-28 PROCEDURE — 1159F MED LIST DOCD IN RCRD: CPT | Mod: CPTII,S$GLB,, | Performed by: FAMILY MEDICINE

## 2021-07-28 PROCEDURE — 99214 OFFICE O/P EST MOD 30 MIN: CPT | Mod: S$GLB,,, | Performed by: FAMILY MEDICINE

## 2021-07-28 PROCEDURE — U0002 COVID-19 LAB TEST NON-CDC: HCPCS | Mod: QW,S$GLB,, | Performed by: FAMILY MEDICINE

## 2021-07-28 PROCEDURE — 99214 PR OFFICE/OUTPT VISIT, EST, LEVL IV, 30-39 MIN: ICD-10-PCS | Mod: S$GLB,,, | Performed by: FAMILY MEDICINE

## 2021-07-28 RX ORDER — PROMETHAZINE HYDROCHLORIDE 6.25 MG/5ML
SYRUP ORAL
Qty: 120 ML | Refills: 1 | Status: SHIPPED | OUTPATIENT
Start: 2021-07-28 | End: 2021-08-26

## 2021-07-28 RX ORDER — ALBUTEROL SULFATE 90 UG/1
2 AEROSOL, METERED RESPIRATORY (INHALATION) EVERY 6 HOURS PRN
Qty: 18 G | Refills: 2 | Status: SHIPPED | OUTPATIENT
Start: 2021-07-28 | End: 2021-08-20

## 2021-08-18 ENCOUNTER — OFFICE VISIT (OUTPATIENT)
Dept: URGENT CARE | Facility: CLINIC | Age: 21
End: 2021-08-18
Payer: COMMERCIAL

## 2021-08-18 VITALS
TEMPERATURE: 99 F | WEIGHT: 165 LBS | OXYGEN SATURATION: 99 % | SYSTOLIC BLOOD PRESSURE: 130 MMHG | DIASTOLIC BLOOD PRESSURE: 89 MMHG | HEIGHT: 64 IN | BODY MASS INDEX: 28.17 KG/M2 | HEART RATE: 98 BPM

## 2021-08-18 DIAGNOSIS — R53.83 FATIGUE, UNSPECIFIED TYPE: Primary | ICD-10-CM

## 2021-08-18 PROCEDURE — 3079F PR MOST RECENT DIASTOLIC BLOOD PRESSURE 80-89 MM HG: ICD-10-PCS | Mod: CPTII,S$GLB,, | Performed by: PHYSICIAN ASSISTANT

## 2021-08-18 PROCEDURE — 3075F PR MOST RECENT SYSTOLIC BLOOD PRESS GE 130-139MM HG: ICD-10-PCS | Mod: CPTII,S$GLB,, | Performed by: PHYSICIAN ASSISTANT

## 2021-08-18 PROCEDURE — 3008F PR BODY MASS INDEX (BMI) DOCUMENTED: ICD-10-PCS | Mod: CPTII,S$GLB,, | Performed by: PHYSICIAN ASSISTANT

## 2021-08-18 PROCEDURE — 3075F SYST BP GE 130 - 139MM HG: CPT | Mod: CPTII,S$GLB,, | Performed by: PHYSICIAN ASSISTANT

## 2021-08-18 PROCEDURE — 1160F PR REVIEW ALL MEDS BY PRESCRIBER/CLIN PHARMACIST DOCUMENTED: ICD-10-PCS | Mod: CPTII,S$GLB,, | Performed by: PHYSICIAN ASSISTANT

## 2021-08-18 PROCEDURE — 99213 PR OFFICE/OUTPT VISIT, EST, LEVL III, 20-29 MIN: ICD-10-PCS | Mod: S$GLB,,, | Performed by: PHYSICIAN ASSISTANT

## 2021-08-18 PROCEDURE — 3079F DIAST BP 80-89 MM HG: CPT | Mod: CPTII,S$GLB,, | Performed by: PHYSICIAN ASSISTANT

## 2021-08-18 PROCEDURE — 1160F RVW MEDS BY RX/DR IN RCRD: CPT | Mod: CPTII,S$GLB,, | Performed by: PHYSICIAN ASSISTANT

## 2021-08-18 PROCEDURE — 1159F MED LIST DOCD IN RCRD: CPT | Mod: CPTII,S$GLB,, | Performed by: PHYSICIAN ASSISTANT

## 2021-08-18 PROCEDURE — 99213 OFFICE O/P EST LOW 20 MIN: CPT | Mod: S$GLB,,, | Performed by: PHYSICIAN ASSISTANT

## 2021-08-18 PROCEDURE — 1159F PR MEDICATION LIST DOCUMENTED IN MEDICAL RECORD: ICD-10-PCS | Mod: CPTII,S$GLB,, | Performed by: PHYSICIAN ASSISTANT

## 2021-08-18 PROCEDURE — 3008F BODY MASS INDEX DOCD: CPT | Mod: CPTII,S$GLB,, | Performed by: PHYSICIAN ASSISTANT

## 2021-08-20 ENCOUNTER — OFFICE VISIT (OUTPATIENT)
Dept: FAMILY MEDICINE | Facility: CLINIC | Age: 21
End: 2021-08-20
Payer: COMMERCIAL

## 2021-08-20 VITALS
TEMPERATURE: 99 F | DIASTOLIC BLOOD PRESSURE: 80 MMHG | HEIGHT: 64 IN | HEART RATE: 101 BPM | WEIGHT: 159.81 LBS | RESPIRATION RATE: 18 BRPM | SYSTOLIC BLOOD PRESSURE: 122 MMHG | BODY MASS INDEX: 27.28 KG/M2

## 2021-08-20 DIAGNOSIS — R53.83 FATIGUE, UNSPECIFIED TYPE: Primary | ICD-10-CM

## 2021-08-20 DIAGNOSIS — Z00.00 LABORATORY EXAM ORDERED AS PART OF ROUTINE GENERAL MEDICAL EXAMINATION: ICD-10-CM

## 2021-08-20 PROCEDURE — 1159F MED LIST DOCD IN RCRD: CPT | Mod: CPTII,S$GLB,, | Performed by: NURSE PRACTITIONER

## 2021-08-20 PROCEDURE — 3079F DIAST BP 80-89 MM HG: CPT | Mod: CPTII,S$GLB,, | Performed by: NURSE PRACTITIONER

## 2021-08-20 PROCEDURE — 3044F PR MOST RECENT HEMOGLOBIN A1C LEVEL <7.0%: ICD-10-PCS | Mod: CPTII,S$GLB,, | Performed by: NURSE PRACTITIONER

## 2021-08-20 PROCEDURE — 99214 OFFICE O/P EST MOD 30 MIN: CPT | Mod: S$GLB,,, | Performed by: NURSE PRACTITIONER

## 2021-08-20 PROCEDURE — 1126F PR PAIN SEVERITY QUANTIFIED, NO PAIN PRESENT: ICD-10-PCS | Mod: CPTII,S$GLB,, | Performed by: NURSE PRACTITIONER

## 2021-08-20 PROCEDURE — 3074F SYST BP LT 130 MM HG: CPT | Mod: CPTII,S$GLB,, | Performed by: NURSE PRACTITIONER

## 2021-08-20 PROCEDURE — 3008F PR BODY MASS INDEX (BMI) DOCUMENTED: ICD-10-PCS | Mod: CPTII,S$GLB,, | Performed by: NURSE PRACTITIONER

## 2021-08-20 PROCEDURE — 3044F HG A1C LEVEL LT 7.0%: CPT | Mod: CPTII,S$GLB,, | Performed by: NURSE PRACTITIONER

## 2021-08-20 PROCEDURE — 3008F BODY MASS INDEX DOCD: CPT | Mod: CPTII,S$GLB,, | Performed by: NURSE PRACTITIONER

## 2021-08-20 PROCEDURE — 3079F PR MOST RECENT DIASTOLIC BLOOD PRESSURE 80-89 MM HG: ICD-10-PCS | Mod: CPTII,S$GLB,, | Performed by: NURSE PRACTITIONER

## 2021-08-20 PROCEDURE — 99214 PR OFFICE/OUTPT VISIT, EST, LEVL IV, 30-39 MIN: ICD-10-PCS | Mod: S$GLB,,, | Performed by: NURSE PRACTITIONER

## 2021-08-20 PROCEDURE — 1126F AMNT PAIN NOTED NONE PRSNT: CPT | Mod: CPTII,S$GLB,, | Performed by: NURSE PRACTITIONER

## 2021-08-20 PROCEDURE — 1159F PR MEDICATION LIST DOCUMENTED IN MEDICAL RECORD: ICD-10-PCS | Mod: CPTII,S$GLB,, | Performed by: NURSE PRACTITIONER

## 2021-08-20 PROCEDURE — 3074F PR MOST RECENT SYSTOLIC BLOOD PRESSURE < 130 MM HG: ICD-10-PCS | Mod: CPTII,S$GLB,, | Performed by: NURSE PRACTITIONER

## 2021-08-21 ENCOUNTER — PATIENT MESSAGE (OUTPATIENT)
Dept: FAMILY MEDICINE | Facility: CLINIC | Age: 21
End: 2021-08-21

## 2021-08-24 ENCOUNTER — PATIENT MESSAGE (OUTPATIENT)
Dept: FAMILY MEDICINE | Facility: CLINIC | Age: 21
End: 2021-08-24

## 2021-08-24 ENCOUNTER — LAB VISIT (OUTPATIENT)
Dept: LAB | Facility: HOSPITAL | Age: 21
End: 2021-08-24
Payer: COMMERCIAL

## 2021-08-24 DIAGNOSIS — Z00.00 LABORATORY EXAM ORDERED AS PART OF ROUTINE GENERAL MEDICAL EXAMINATION: ICD-10-CM

## 2021-08-24 LAB
ALBUMIN SERPL BCP-MCNC: 4.1 G/DL (ref 3.5–5.2)
ALP SERPL-CCNC: 57 U/L (ref 55–135)
ALT SERPL W/O P-5'-P-CCNC: 16 U/L (ref 10–44)
ANION GAP SERPL CALC-SCNC: 10 MMOL/L (ref 8–16)
AST SERPL-CCNC: 17 U/L (ref 10–40)
BASOPHILS # BLD AUTO: 0.02 K/UL (ref 0–0.2)
BASOPHILS NFR BLD: 0.4 % (ref 0–1.9)
BILIRUB SERPL-MCNC: 1.2 MG/DL (ref 0.1–1)
BUN SERPL-MCNC: 10 MG/DL (ref 6–20)
CALCIUM SERPL-MCNC: 9.8 MG/DL (ref 8.7–10.5)
CHLORIDE SERPL-SCNC: 108 MMOL/L (ref 95–110)
CHOLEST SERPL-MCNC: 145 MG/DL (ref 120–199)
CHOLEST/HDLC SERPL: 2.8 {RATIO} (ref 2–5)
CO2 SERPL-SCNC: 21 MMOL/L (ref 23–29)
CREAT SERPL-MCNC: 0.6 MG/DL (ref 0.5–1.4)
DIFFERENTIAL METHOD: ABNORMAL
EOSINOPHIL # BLD AUTO: 0.1 K/UL (ref 0–0.5)
EOSINOPHIL NFR BLD: 1.4 % (ref 0–8)
ERYTHROCYTE [DISTWIDTH] IN BLOOD BY AUTOMATED COUNT: 12.1 % (ref 11.5–14.5)
EST. GFR  (AFRICAN AMERICAN): >60 ML/MIN/1.73 M^2
EST. GFR  (NON AFRICAN AMERICAN): >60 ML/MIN/1.73 M^2
ESTIMATED AVG GLUCOSE: 77 MG/DL (ref 68–131)
GLUCOSE SERPL-MCNC: 98 MG/DL (ref 70–110)
HBA1C MFR BLD: 4.3 % (ref 4–5.6)
HCT VFR BLD AUTO: 36.6 % (ref 37–48.5)
HDLC SERPL-MCNC: 51 MG/DL (ref 40–75)
HDLC SERPL: 35.2 % (ref 20–50)
HGB BLD-MCNC: 12.9 G/DL (ref 12–16)
IMM GRANULOCYTES # BLD AUTO: 0.04 K/UL (ref 0–0.04)
IMM GRANULOCYTES NFR BLD AUTO: 0.8 % (ref 0–0.5)
LDLC SERPL CALC-MCNC: 84.2 MG/DL (ref 63–159)
LYMPHOCYTES # BLD AUTO: 1.3 K/UL (ref 1–4.8)
LYMPHOCYTES NFR BLD: 25.3 % (ref 18–48)
MCH RBC QN AUTO: 31.2 PG (ref 27–31)
MCHC RBC AUTO-ENTMCNC: 35.2 G/DL (ref 32–36)
MCV RBC AUTO: 89 FL (ref 82–98)
MONOCYTES # BLD AUTO: 0.4 K/UL (ref 0.3–1)
MONOCYTES NFR BLD: 8.1 % (ref 4–15)
NEUTROPHILS # BLD AUTO: 3.2 K/UL (ref 1.8–7.7)
NEUTROPHILS NFR BLD: 64 % (ref 38–73)
NONHDLC SERPL-MCNC: 94 MG/DL
NRBC BLD-RTO: 0 /100 WBC
PLATELET # BLD AUTO: 243 K/UL (ref 150–450)
PMV BLD AUTO: 11.6 FL (ref 9.2–12.9)
POTASSIUM SERPL-SCNC: 4.2 MMOL/L (ref 3.5–5.1)
PROT SERPL-MCNC: 7.2 G/DL (ref 6–8.4)
RBC # BLD AUTO: 4.13 M/UL (ref 4–5.4)
SODIUM SERPL-SCNC: 139 MMOL/L (ref 136–145)
TRIGL SERPL-MCNC: 49 MG/DL (ref 30–150)
TSH SERPL DL<=0.005 MIU/L-ACNC: 0.94 UIU/ML (ref 0.4–4)
WBC # BLD AUTO: 4.95 K/UL (ref 3.9–12.7)

## 2021-08-24 PROCEDURE — 80061 LIPID PANEL: CPT | Performed by: NURSE PRACTITIONER

## 2021-08-24 PROCEDURE — 84443 ASSAY THYROID STIM HORMONE: CPT | Performed by: NURSE PRACTITIONER

## 2021-08-24 PROCEDURE — 83036 HEMOGLOBIN GLYCOSYLATED A1C: CPT | Performed by: NURSE PRACTITIONER

## 2021-08-24 PROCEDURE — 85025 COMPLETE CBC W/AUTO DIFF WBC: CPT | Performed by: NURSE PRACTITIONER

## 2021-08-24 PROCEDURE — 80053 COMPREHEN METABOLIC PANEL: CPT | Performed by: NURSE PRACTITIONER

## 2021-08-24 PROCEDURE — 36415 COLL VENOUS BLD VENIPUNCTURE: CPT | Mod: PO | Performed by: NURSE PRACTITIONER

## 2021-08-27 ENCOUNTER — OFFICE VISIT (OUTPATIENT)
Dept: FAMILY MEDICINE | Facility: CLINIC | Age: 21
End: 2021-08-27
Payer: COMMERCIAL

## 2021-08-27 ENCOUNTER — OFFICE VISIT (OUTPATIENT)
Dept: OPTOMETRY | Facility: CLINIC | Age: 21
End: 2021-08-27
Payer: COMMERCIAL

## 2021-08-27 DIAGNOSIS — H52.13 MYOPIA OF BOTH EYES: Primary | ICD-10-CM

## 2021-08-27 DIAGNOSIS — R53.83 FATIGUE, UNSPECIFIED TYPE: Primary | ICD-10-CM

## 2021-08-27 DIAGNOSIS — U07.1 COVID-19: ICD-10-CM

## 2021-08-27 DIAGNOSIS — Z46.0 FITTING AND ADJUSTMENT OF SPECTACLES AND CONTACT LENSES: Primary | ICD-10-CM

## 2021-08-27 PROCEDURE — 3044F PR MOST RECENT HEMOGLOBIN A1C LEVEL <7.0%: ICD-10-PCS | Mod: CPTII,,, | Performed by: NURSE PRACTITIONER

## 2021-08-27 PROCEDURE — 1159F MED LIST DOCD IN RCRD: CPT | Mod: CPTII,S$GLB,, | Performed by: OPTOMETRIST

## 2021-08-27 PROCEDURE — 92015 DETERMINE REFRACTIVE STATE: CPT | Mod: S$GLB,,, | Performed by: OPTOMETRIST

## 2021-08-27 PROCEDURE — 99999 PR PBB SHADOW E&M-EST. PATIENT-LVL III: ICD-10-PCS | Mod: PBBFAC,,, | Performed by: OPTOMETRIST

## 2021-08-27 PROCEDURE — 92310 CONTACT LENS FITTING OU: CPT | Mod: S$GLB,,, | Performed by: OPTOMETRIST

## 2021-08-27 PROCEDURE — 92310 PR CONTACT LENS FITTING (NO CHANGE): ICD-10-PCS | Mod: S$GLB,,, | Performed by: OPTOMETRIST

## 2021-08-27 PROCEDURE — 92015 PR REFRACTION: ICD-10-PCS | Mod: S$GLB,,, | Performed by: OPTOMETRIST

## 2021-08-27 PROCEDURE — 99999 PR PBB SHADOW E&M-EST. PATIENT-LVL II: CPT | Mod: PBBFAC,,, | Performed by: OPTOMETRIST

## 2021-08-27 PROCEDURE — 99999 PR PBB SHADOW E&M-EST. PATIENT-LVL III: CPT | Mod: PBBFAC,,, | Performed by: OPTOMETRIST

## 2021-08-27 PROCEDURE — 99999 PR PBB SHADOW E&M-EST. PATIENT-LVL II: ICD-10-PCS | Mod: PBBFAC,,, | Performed by: OPTOMETRIST

## 2021-08-27 PROCEDURE — 92014 COMPRE OPH EXAM EST PT 1/>: CPT | Mod: S$GLB,,, | Performed by: OPTOMETRIST

## 2021-08-27 PROCEDURE — 1160F PR REVIEW ALL MEDS BY PRESCRIBER/CLIN PHARMACIST DOCUMENTED: ICD-10-PCS | Mod: CPTII,S$GLB,, | Performed by: OPTOMETRIST

## 2021-08-27 PROCEDURE — 92014 PR EYE EXAM, EST PATIENT,COMPREHESV: ICD-10-PCS | Mod: S$GLB,,, | Performed by: OPTOMETRIST

## 2021-08-27 PROCEDURE — 99214 OFFICE O/P EST MOD 30 MIN: CPT | Mod: 95,,, | Performed by: NURSE PRACTITIONER

## 2021-08-27 PROCEDURE — 99214 PR OFFICE/OUTPT VISIT, EST, LEVL IV, 30-39 MIN: ICD-10-PCS | Mod: 95,,, | Performed by: NURSE PRACTITIONER

## 2021-08-27 PROCEDURE — 3044F HG A1C LEVEL LT 7.0%: CPT | Mod: CPTII,,, | Performed by: NURSE PRACTITIONER

## 2021-08-27 PROCEDURE — 1159F PR MEDICATION LIST DOCUMENTED IN MEDICAL RECORD: ICD-10-PCS | Mod: CPTII,S$GLB,, | Performed by: OPTOMETRIST

## 2021-08-27 PROCEDURE — 1160F RVW MEDS BY RX/DR IN RCRD: CPT | Mod: CPTII,S$GLB,, | Performed by: OPTOMETRIST

## 2021-09-07 ENCOUNTER — PATIENT MESSAGE (OUTPATIENT)
Dept: FAMILY MEDICINE | Facility: CLINIC | Age: 21
End: 2021-09-07

## 2021-09-24 ENCOUNTER — OFFICE VISIT (OUTPATIENT)
Dept: OPTOMETRY | Facility: CLINIC | Age: 21
End: 2021-09-24
Payer: COMMERCIAL

## 2021-09-24 DIAGNOSIS — H52.13 MYOPIA OF BOTH EYES: Primary | ICD-10-CM

## 2021-09-24 PROCEDURE — 99499 NO LOS: ICD-10-PCS | Mod: S$GLB,,, | Performed by: OPTOMETRIST

## 2021-09-24 PROCEDURE — 99499 UNLISTED E&M SERVICE: CPT | Mod: S$GLB,,, | Performed by: OPTOMETRIST

## 2021-10-04 DIAGNOSIS — F90.2 ATTENTION DEFICIT HYPERACTIVITY DISORDER (ADHD), COMBINED TYPE: ICD-10-CM

## 2021-10-04 RX ORDER — LISDEXAMFETAMINE DIMESYLATE CAPSULES 20 MG/1
20 CAPSULE ORAL EVERY MORNING
Qty: 30 CAPSULE | Refills: 0 | Status: SHIPPED | OUTPATIENT
Start: 2021-10-04 | End: 2021-11-09 | Stop reason: SDUPTHER

## 2021-10-07 ENCOUNTER — PATIENT MESSAGE (OUTPATIENT)
Dept: PSYCHIATRY | Facility: CLINIC | Age: 21
End: 2021-10-07

## 2021-10-07 DIAGNOSIS — F43.23 ADJUSTMENT DISORDER WITH MIXED ANXIETY AND DEPRESSED MOOD: ICD-10-CM

## 2021-10-07 RX ORDER — SERTRALINE HYDROCHLORIDE 25 MG/1
25 TABLET, FILM COATED ORAL DAILY
Qty: 30 TABLET | Refills: 2 | Status: SHIPPED | OUTPATIENT
Start: 2021-10-07 | End: 2021-10-29

## 2021-10-08 ENCOUNTER — TELEPHONE (OUTPATIENT)
Dept: OPTOMETRY | Facility: CLINIC | Age: 21
End: 2021-10-08

## 2021-10-19 ENCOUNTER — OFFICE VISIT (OUTPATIENT)
Dept: PSYCHIATRY | Facility: CLINIC | Age: 21
End: 2021-10-19
Payer: COMMERCIAL

## 2021-10-19 DIAGNOSIS — F90.2 ATTENTION DEFICIT HYPERACTIVITY DISORDER (ADHD), COMBINED TYPE: Primary | ICD-10-CM

## 2021-10-19 DIAGNOSIS — F43.23 ADJUSTMENT DISORDER WITH MIXED ANXIETY AND DEPRESSED MOOD: ICD-10-CM

## 2021-10-19 PROCEDURE — 1160F RVW MEDS BY RX/DR IN RCRD: CPT | Mod: CPTII,95,, | Performed by: PHYSICIAN ASSISTANT

## 2021-10-19 PROCEDURE — 3044F PR MOST RECENT HEMOGLOBIN A1C LEVEL <7.0%: ICD-10-PCS | Mod: CPTII,95,, | Performed by: PHYSICIAN ASSISTANT

## 2021-10-19 PROCEDURE — 1159F PR MEDICATION LIST DOCUMENTED IN MEDICAL RECORD: ICD-10-PCS | Mod: CPTII,95,, | Performed by: PHYSICIAN ASSISTANT

## 2021-10-19 PROCEDURE — 99214 PR OFFICE/OUTPT VISIT, EST, LEVL IV, 30-39 MIN: ICD-10-PCS | Mod: 95,,, | Performed by: PHYSICIAN ASSISTANT

## 2021-10-19 PROCEDURE — 1159F MED LIST DOCD IN RCRD: CPT | Mod: CPTII,95,, | Performed by: PHYSICIAN ASSISTANT

## 2021-10-19 PROCEDURE — 99214 OFFICE O/P EST MOD 30 MIN: CPT | Mod: 95,,, | Performed by: PHYSICIAN ASSISTANT

## 2021-10-19 PROCEDURE — 1160F PR REVIEW ALL MEDS BY PRESCRIBER/CLIN PHARMACIST DOCUMENTED: ICD-10-PCS | Mod: CPTII,95,, | Performed by: PHYSICIAN ASSISTANT

## 2021-10-19 PROCEDURE — 3044F HG A1C LEVEL LT 7.0%: CPT | Mod: CPTII,95,, | Performed by: PHYSICIAN ASSISTANT

## 2021-11-04 DIAGNOSIS — F43.23 ADJUSTMENT DISORDER WITH MIXED ANXIETY AND DEPRESSED MOOD: ICD-10-CM

## 2021-11-04 RX ORDER — SERTRALINE HYDROCHLORIDE 25 MG/1
25 TABLET, FILM COATED ORAL DAILY
Qty: 90 TABLET | Refills: 1 | Status: CANCELLED | OUTPATIENT
Start: 2021-11-04

## 2021-11-05 RX ORDER — SERTRALINE HYDROCHLORIDE 25 MG/1
25 TABLET, FILM COATED ORAL DAILY
Qty: 30 TABLET | Refills: 2 | Status: SHIPPED | OUTPATIENT
Start: 2021-11-05 | End: 2022-01-19 | Stop reason: SDUPTHER

## 2021-11-09 DIAGNOSIS — F90.2 ATTENTION DEFICIT HYPERACTIVITY DISORDER (ADHD), COMBINED TYPE: ICD-10-CM

## 2021-11-09 RX ORDER — LISDEXAMFETAMINE DIMESYLATE CAPSULES 20 MG/1
20 CAPSULE ORAL EVERY MORNING
Qty: 30 CAPSULE | Refills: 0 | Status: SHIPPED | OUTPATIENT
Start: 2021-11-09 | End: 2021-12-15 | Stop reason: SDUPTHER

## 2021-12-15 DIAGNOSIS — F90.2 ATTENTION DEFICIT HYPERACTIVITY DISORDER (ADHD), COMBINED TYPE: ICD-10-CM

## 2021-12-15 RX ORDER — LISDEXAMFETAMINE DIMESYLATE CAPSULES 20 MG/1
20 CAPSULE ORAL EVERY MORNING
Qty: 30 CAPSULE | Refills: 0 | Status: SHIPPED | OUTPATIENT
Start: 2021-12-15 | End: 2022-01-19 | Stop reason: SDUPTHER

## 2022-01-03 ENCOUNTER — TELEPHONE (OUTPATIENT)
Dept: PSYCHIATRY | Facility: CLINIC | Age: 22
End: 2022-01-03
Payer: COMMERCIAL

## 2022-01-03 NOTE — TELEPHONE ENCOUNTER
Called to change appointment to virtual due to provider out of the office this week. No naswer, no voice mail option, sent my chart.

## 2022-01-04 ENCOUNTER — PATIENT MESSAGE (OUTPATIENT)
Dept: ADMINISTRATIVE | Facility: OTHER | Age: 22
End: 2022-01-04
Payer: COMMERCIAL

## 2022-01-04 ENCOUNTER — LAB VISIT (OUTPATIENT)
Dept: PRIMARY CARE CLINIC | Facility: OTHER | Age: 22
End: 2022-01-04
Payer: COMMERCIAL

## 2022-01-04 DIAGNOSIS — R50.9 FEVER: ICD-10-CM

## 2022-01-04 PROCEDURE — U0003 INFECTIOUS AGENT DETECTION BY NUCLEIC ACID (DNA OR RNA); SEVERE ACUTE RESPIRATORY SYNDROME CORONAVIRUS 2 (SARS-COV-2) (CORONAVIRUS DISEASE [COVID-19]), AMPLIFIED PROBE TECHNIQUE, MAKING USE OF HIGH THROUGHPUT TECHNOLOGIES AS DESCRIBED BY CMS-2020-01-R: HCPCS | Mod: ST72 | Performed by: FAMILY MEDICINE

## 2022-01-08 LAB
SARS-COV-2 RNA RESP QL NAA+PROBE: DETECTED
SARS-COV-2- CYCLE NUMBER: 22

## 2022-01-18 NOTE — PROGRESS NOTES
"Individual Psychotherapy (PhD/LCSW)    1/20/2022    Site:  [facility]         Therapeutic Intervention: Met with patient.  Outpatient - Insight oriented psychotherapy 45 min - CPT code 80704 and Outpatient - Supportive psychotherapy 45 min - CPT Code 20855    Chief complaint/reason for encounter: depression and anxiety     Interval history and content of current session: Last visit with me: 06/03/21. Almas shared that it has been so long since coming back to therapy because she was doing well at the time of our last appt. Almas shared that she has overall been doing well since then, explaining that she sees Carolyne Greenberg PA-C for medication management and feels that her medications are working. She said that she does not really feel depressed anymore, and she said that the anxiety that she does experience is less frequent and is more manageable. 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 She said that she now feels "back to normal." Discussed that she feels she is living life now more in the present moment, but she is having difficulty deciding what to do for a career. We discussed being realistic about work and the importance of finding something that is satisfying and fulfilling, but the importance of obtaining fulfillment in other areas as well. Discussed that she is interested in helping others, likes to problem solve. Discussed that she does not have to be tied to what her bachelor's is in. Discussed finding connections and engaging in informational interviews as well as limiting options by thinking about what she does not like/is not interested in.     Treatment plan:  · Target symptoms: depression, anxiety , adjustment  · Why chosen therapy is appropriate versus another modality: relevant to diagnosis, evidence based practice  · Outcome monitoring methods: self-report  · Therapeutic intervention type: insight oriented " psychotherapy, behavior modifying psychotherapy, supportive psychotherapy    Risk parameters:  Patient reports no suicidal ideation  Patient reports no homicidal ideation  Patient reports no self-injurious behavior  Patient reports no violent behavior    Verbal deficits: None    Patient's response to intervention:  The patient's response to intervention is accepting.    Progress toward goals and other mental status changes:  The patient's progress toward goals is good.    Diagnosis:     ICD-10-CM ICD-9-CM   1. Adjustment disorder with mixed anxiety and depressed mood  F43.23 309.28   2. Attention deficit hyperactivity disorder (ADHD), combined type  F90.2 314.01       Plan:  individual psychotherapy and medication management by physician Pt to go to ED or call 911 if symptoms worsen or if she has thoughts of harming self and/or others. Pt verbalized understanding.    Return to clinic: 2 weeks    Length of Service (minutes): 45      Each patient to whom he or she provides medical services by telemedicine is: (1) informed of the relationship between the physician and patient and the respective role of any other health care provider with respect to management of the patient; and (2) notified that he or she may decline to receive medical services by telemedicine and may withdraw from such care at any time.

## 2022-01-19 ENCOUNTER — OFFICE VISIT (OUTPATIENT)
Dept: PSYCHIATRY | Facility: CLINIC | Age: 22
End: 2022-01-19
Payer: COMMERCIAL

## 2022-01-19 VITALS
SYSTOLIC BLOOD PRESSURE: 125 MMHG | HEART RATE: 95 BPM | BODY MASS INDEX: 29.15 KG/M2 | DIASTOLIC BLOOD PRESSURE: 81 MMHG | HEIGHT: 64 IN | WEIGHT: 170.75 LBS

## 2022-01-19 DIAGNOSIS — F43.23 ADJUSTMENT DISORDER WITH MIXED ANXIETY AND DEPRESSED MOOD: ICD-10-CM

## 2022-01-19 DIAGNOSIS — F90.2 ATTENTION DEFICIT HYPERACTIVITY DISORDER (ADHD), COMBINED TYPE: ICD-10-CM

## 2022-01-19 PROCEDURE — 3074F PR MOST RECENT SYSTOLIC BLOOD PRESSURE < 130 MM HG: ICD-10-PCS | Mod: CPTII,S$GLB,, | Performed by: PHYSICIAN ASSISTANT

## 2022-01-19 PROCEDURE — 1159F PR MEDICATION LIST DOCUMENTED IN MEDICAL RECORD: ICD-10-PCS | Mod: CPTII,S$GLB,, | Performed by: PHYSICIAN ASSISTANT

## 2022-01-19 PROCEDURE — 99999 PR PBB SHADOW E&M-EST. PATIENT-LVL III: ICD-10-PCS | Mod: PBBFAC,,, | Performed by: PHYSICIAN ASSISTANT

## 2022-01-19 PROCEDURE — 1160F PR REVIEW ALL MEDS BY PRESCRIBER/CLIN PHARMACIST DOCUMENTED: ICD-10-PCS | Mod: CPTII,S$GLB,, | Performed by: PHYSICIAN ASSISTANT

## 2022-01-19 PROCEDURE — 3008F BODY MASS INDEX DOCD: CPT | Mod: CPTII,S$GLB,, | Performed by: PHYSICIAN ASSISTANT

## 2022-01-19 PROCEDURE — 99214 PR OFFICE/OUTPT VISIT, EST, LEVL IV, 30-39 MIN: ICD-10-PCS | Mod: S$GLB,,, | Performed by: PHYSICIAN ASSISTANT

## 2022-01-19 PROCEDURE — 99214 OFFICE O/P EST MOD 30 MIN: CPT | Mod: S$GLB,,, | Performed by: PHYSICIAN ASSISTANT

## 2022-01-19 PROCEDURE — 3079F DIAST BP 80-89 MM HG: CPT | Mod: CPTII,S$GLB,, | Performed by: PHYSICIAN ASSISTANT

## 2022-01-19 PROCEDURE — 3008F PR BODY MASS INDEX (BMI) DOCUMENTED: ICD-10-PCS | Mod: CPTII,S$GLB,, | Performed by: PHYSICIAN ASSISTANT

## 2022-01-19 PROCEDURE — 1160F RVW MEDS BY RX/DR IN RCRD: CPT | Mod: CPTII,S$GLB,, | Performed by: PHYSICIAN ASSISTANT

## 2022-01-19 PROCEDURE — 1159F MED LIST DOCD IN RCRD: CPT | Mod: CPTII,S$GLB,, | Performed by: PHYSICIAN ASSISTANT

## 2022-01-19 PROCEDURE — 3074F SYST BP LT 130 MM HG: CPT | Mod: CPTII,S$GLB,, | Performed by: PHYSICIAN ASSISTANT

## 2022-01-19 PROCEDURE — 3079F PR MOST RECENT DIASTOLIC BLOOD PRESSURE 80-89 MM HG: ICD-10-PCS | Mod: CPTII,S$GLB,, | Performed by: PHYSICIAN ASSISTANT

## 2022-01-19 PROCEDURE — 99999 PR PBB SHADOW E&M-EST. PATIENT-LVL III: CPT | Mod: PBBFAC,,, | Performed by: PHYSICIAN ASSISTANT

## 2022-01-19 RX ORDER — LISDEXAMFETAMINE DIMESYLATE CAPSULES 20 MG/1
20 CAPSULE ORAL EVERY MORNING
Qty: 30 CAPSULE | Refills: 0 | Status: SHIPPED | OUTPATIENT
Start: 2022-01-19 | End: 2022-03-07 | Stop reason: SDUPTHER

## 2022-01-19 RX ORDER — SERTRALINE HYDROCHLORIDE 50 MG/1
50 TABLET, FILM COATED ORAL DAILY
Qty: 30 TABLET | Refills: 2 | Status: SHIPPED | OUTPATIENT
Start: 2022-01-19 | End: 2022-01-19

## 2022-01-19 NOTE — PROGRESS NOTES
"Outpatient Psychiatry Follow-Up Visit (MD/NP)    1/19/2022    Clinical Status of Patient:  Outpatient (Ambulatory)    Chief Complaint:  Almas Colon is a 21 y.o. female who presents today for follow-up of depression, anxiety and attention problems.  Met with patient.    Interval History and Content of Current Session:  Interim Events/Subjective Report/Content of Current Session:  Almas is seen today for medication follow up. She states she is doing pretty well. 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 She states it's fun and gives her something to do. Has applied at [employer] and out of state jobs in [place] and [place]. She states that mood wise, she has been great. Sertraline has americo helping significantly with her mood. Anxiety is well controlled. She has been eating healthy, taking vitamins, and going to the gym. Her appetite is "okay", she is not hungry all the time. Some times has to force herself to eat a meal. She has weight gain of 5lbs. She has a boyfriend who is doing well and just applied to [employer]. She has upcoming therapy appointment tomorrow at 11am. She is tolerating low dose Vyvanse well. Declines need for medication adjustments. Denies SI/HI. No other concerns today.    PHQ9: 4, somewhat difficult    FROM PREVIOUS HPI  Almas is seen today for medication follow-up.  Patient reports that after our previous visit, she did have COVID really bad.  She was not able to start the sertraline for a month and half.  She has started the medicine and has been taking it for about one month and feels that it is really helping her.  She is still utilizing Vyvanse as well.  Had really bad fatigue and weakness post COVID.  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  Her goal was to focus on her health.  She is not working at this time but is applying for jobs.  Sleep is also improved " with sertraline.  Patient states she is currently about to start her menstrual cycle so she does have insomnia at this time.  Other than that, sleep is great and sleeping about 8 hours per night.  Appetite is adequate.  She is eating three meals per day.  She states she does not get super hungry with the medications but she does know that she needs to eat heathy meals.  She does not utilize nicotine products.  Not much alcohol.  No other substances.  Drinks one cup of coffee per day.  She is planning on getting COVID vaccine in the future.  She adamantly denies suicidal or homicidal ideation.  No other complaints today.      GAD7 1/19/2022 10/19/2021 7/21/2021   1. Feeling nervous, anxious, or on edge? 0 0 0   2. Not being able to stop or control worrying? 0 0 1   3. Worrying too much about different things? 0 0 1   4. Trouble relaxing? 0 0 0   5. Being so restless that it is hard to sit still? 0 0 0   6. Becoming easily annoyed or irritable? 0 0 1   7. Feeling afraid as if something awful might happen? 0 0 0   8. If you checked off any problems, how difficult have these problems made it for you to do your work, take care of things at home, or get along with other people? 0 0 1   MATHEW-7 Score 0 0 3         Review of Systems   · PSYCHIATRIC: Pertinant items are noted in the narrative.  · RESPIRATORY: No shortness of breath.  · CARDIOVASCULAR: No tachycardia or chest pain.  · GASTROINTESTINAL: No nausea, vomiting, pain, constipation or diarrhea.    Past Medical, Family and Social History: The patient's past medical, family and social history have been reviewed and updated as appropriate within the electronic medical record - see encounter notes.    Compliance: yes    Side effects: some increase in BM, IBS likely    Risk Parameters:  Patient reports no suicidal ideation  Patient reports no homicidal ideation  Patient reports no self-injurious behavior  Patient reports no violent behavior    Exam (detailed: at least 9  elements; comprehensive: all 15 elements)   Constitutional  Vitals:  Most recent vital signs, dated less than 90 days prior to this appointment, were reviewed.     Vitals:    01/19/22 1109   BP: 125/81   Pulse: 95        General:  unremarkable, age appropriate, casually dressed     Musculoskeletal  Muscle Strength/Tone:  no dyskinesia   Gait & Station:  virtual visit     Psychiatric  Speech:  no latency; no press   Mood & Affect:  It's great  congruent and appropriate   Thought Process:  normal and logical   Associations:  intact   Thought Content:  normal, no suicidality, no homicidality, delusions, or paranoia   Insight:  intact   Judgement: behavior is adequate to circumstances   Orientation:  grossly intact   Memory: intact for content of interview   Language: grossly intact   Attention Span & Concentration:  able to focus   Fund of Knowledge:  intact and appropriate to age and level of education     Assessment and Diagnosis   Status/Progress: Based on the examination today, the patient's problem(s) is/are improved.  New problems have not been presented today.   Co-morbidities, Diagnostic uncertainty and Lack of compliance are not complicating management of the primary condition.     General Impression:   Adjustment disorder  ADHD, combined presentation type    Intervention/Counseling/Treatment Plan   · Medication Management: Continue current medications. The risks and benefits of medication were discussed with the patient.  · Counseling provided with patient as follows: importance of compliance with chosen treatment options was emphasized, risks and benefits of treatment options, including medications, were discussed with the patient, risk factor reduction, prognosis    Almas is doing well at this time with Vyvanse 20 mg daily and sertraline. Based on assessment today:     Continue Vyvanse 20 mg daily for inattention/concentration deficits.  Increase sertraline to 50mg once daily for  depression/anxiety.    Please go to emergency department if feeling as though you are a harm to yourself or others or if you are in crisis.     Please call the clinic to report any worsening of symptoms or problems associated with medication.    Cardiovascular events: [US Boxed Warning]: Use has been associated with serious cardiovascular events including sudden death in patients with preexisting structural cardiac abnormalities or other serious heart problems (sudden death in children and adolescents; sudden death, stroke, and MI in adults). Consistent with other studies, a large retrospective cohort study involving 1,200,438 children, adolescents, and young adults (aged 2 to 24 years) prescribed methylphenidate, dexmethylphenidate, dextroamphetamines, amphetamine salts, pemoline, or atomoxetine found no evidence that current use of an ADHD medication increased risk for sudden cardiac death, acute MI, or stroke (Sin 2011). Stimulants should be avoided in patients with known structural cardiac abnormalities, cardiomyopathy, serious heart rhythm abnormalities, coronary artery disease, Marfan syndrome, or other serious cardiac problems. Some products are contraindicated in patients with moderate or severe hypertension. Prior to initiating stimulant, assess medical history and family history of sudden death or ventricular arrhythmia; conduct a physical exam to assess for cardiac disease; patients should receive further evaluation if findings suggest cardiac disease, such as ECG and echocardiogram. Promptly conduct cardiac evaluation in patients who develop exertional chest pain, unexplained syncope, or any other symptoms of cardiac disease during stimulant treatment.      Return to Clinic: 3 months

## 2022-01-20 ENCOUNTER — OFFICE VISIT (OUTPATIENT)
Dept: PSYCHIATRY | Facility: CLINIC | Age: 22
End: 2022-01-20
Payer: COMMERCIAL

## 2022-01-20 DIAGNOSIS — F90.2 ATTENTION DEFICIT HYPERACTIVITY DISORDER (ADHD), COMBINED TYPE: ICD-10-CM

## 2022-01-20 DIAGNOSIS — F43.23 ADJUSTMENT DISORDER WITH MIXED ANXIETY AND DEPRESSED MOOD: ICD-10-CM

## 2022-01-20 PROCEDURE — 1159F PR MEDICATION LIST DOCUMENTED IN MEDICAL RECORD: ICD-10-PCS | Mod: CPTII,S$GLB,, | Performed by: SOCIAL WORKER

## 2022-01-20 PROCEDURE — 99999 PR PBB SHADOW E&M-EST. PATIENT-LVL II: CPT | Mod: PBBFAC,,, | Performed by: SOCIAL WORKER

## 2022-01-20 PROCEDURE — 90834 PSYTX W PT 45 MINUTES: CPT | Mod: S$GLB,,, | Performed by: SOCIAL WORKER

## 2022-01-20 PROCEDURE — 90834 PR PSYCHOTHERAPY W/PATIENT, 45 MIN: ICD-10-PCS | Mod: S$GLB,,, | Performed by: SOCIAL WORKER

## 2022-01-20 PROCEDURE — 99999 PR PBB SHADOW E&M-EST. PATIENT-LVL II: ICD-10-PCS | Mod: PBBFAC,,, | Performed by: SOCIAL WORKER

## 2022-01-20 PROCEDURE — 1159F MED LIST DOCD IN RCRD: CPT | Mod: CPTII,S$GLB,, | Performed by: SOCIAL WORKER

## 2022-02-11 ENCOUNTER — TELEPHONE (OUTPATIENT)
Dept: PSYCHIATRY | Facility: CLINIC | Age: 22
End: 2022-02-11
Payer: COMMERCIAL

## 2022-02-11 NOTE — TELEPHONE ENCOUNTER
Called to schedule a follow up appointment with Jennifer Jackson LCSW from the no show/ cancellation list. Phone disconnected.

## 2022-03-07 DIAGNOSIS — F90.2 ATTENTION DEFICIT HYPERACTIVITY DISORDER (ADHD), COMBINED TYPE: ICD-10-CM

## 2022-03-07 RX ORDER — LISDEXAMFETAMINE DIMESYLATE CAPSULES 20 MG/1
20 CAPSULE ORAL EVERY MORNING
Qty: 30 CAPSULE | Refills: 0 | Status: SHIPPED | OUTPATIENT
Start: 2022-03-07 | End: 2022-04-18 | Stop reason: SDUPTHER

## 2022-03-20 ENCOUNTER — PATIENT MESSAGE (OUTPATIENT)
Dept: PSYCHIATRY | Facility: CLINIC | Age: 22
End: 2022-03-20
Payer: COMMERCIAL

## 2022-03-21 ENCOUNTER — TELEPHONE (OUTPATIENT)
Dept: PSYCHIATRY | Facility: CLINIC | Age: 22
End: 2022-03-21
Payer: COMMERCIAL

## 2022-03-21 ENCOUNTER — PATIENT MESSAGE (OUTPATIENT)
Dept: PSYCHIATRY | Facility: CLINIC | Age: 22
End: 2022-03-21
Payer: COMMERCIAL

## 2022-03-21 RX ORDER — BUPROPION HYDROCHLORIDE 150 MG/1
150 TABLET ORAL DAILY
Qty: 30 TABLET | Refills: 1 | Status: SHIPPED | OUTPATIENT
Start: 2022-03-21 | End: 2022-04-12

## 2022-04-12 RX ORDER — BUPROPION HYDROCHLORIDE 150 MG/1
TABLET ORAL
Qty: 90 TABLET | Refills: 0 | Status: SHIPPED | OUTPATIENT
Start: 2022-04-12 | End: 2022-04-20

## 2022-04-18 DIAGNOSIS — F90.2 ATTENTION DEFICIT HYPERACTIVITY DISORDER (ADHD), COMBINED TYPE: ICD-10-CM

## 2022-04-18 RX ORDER — LISDEXAMFETAMINE DIMESYLATE CAPSULES 20 MG/1
20 CAPSULE ORAL EVERY MORNING
Qty: 30 CAPSULE | Refills: 0 | Status: SHIPPED | OUTPATIENT
Start: 2022-04-18 | End: 2022-04-20

## 2022-04-18 NOTE — TELEPHONE ENCOUNTER
Pt is requesting medication refill on lisdexamfetamine (VYVANSE) 20 MG    Last refill: 03/07/2022  Last visit: 01/20/2022  Follow Up: 04/20/2022

## 2022-04-20 ENCOUNTER — OFFICE VISIT (OUTPATIENT)
Dept: PSYCHIATRY | Facility: CLINIC | Age: 22
End: 2022-04-20
Payer: COMMERCIAL

## 2022-04-20 VITALS
DIASTOLIC BLOOD PRESSURE: 88 MMHG | WEIGHT: 172.5 LBS | SYSTOLIC BLOOD PRESSURE: 126 MMHG | BODY MASS INDEX: 29.45 KG/M2 | HEIGHT: 64 IN | HEART RATE: 102 BPM

## 2022-04-20 DIAGNOSIS — F90.2 ATTENTION DEFICIT HYPERACTIVITY DISORDER (ADHD), COMBINED TYPE: Primary | ICD-10-CM

## 2022-04-20 DIAGNOSIS — F43.23 ADJUSTMENT DISORDER WITH MIXED ANXIETY AND DEPRESSED MOOD: ICD-10-CM

## 2022-04-20 PROCEDURE — 3079F DIAST BP 80-89 MM HG: CPT | Mod: CPTII,S$GLB,, | Performed by: PHYSICIAN ASSISTANT

## 2022-04-20 PROCEDURE — 1160F RVW MEDS BY RX/DR IN RCRD: CPT | Mod: CPTII,S$GLB,, | Performed by: PHYSICIAN ASSISTANT

## 2022-04-20 PROCEDURE — 1159F PR MEDICATION LIST DOCUMENTED IN MEDICAL RECORD: ICD-10-PCS | Mod: CPTII,S$GLB,, | Performed by: PHYSICIAN ASSISTANT

## 2022-04-20 PROCEDURE — 3008F PR BODY MASS INDEX (BMI) DOCUMENTED: ICD-10-PCS | Mod: CPTII,S$GLB,, | Performed by: PHYSICIAN ASSISTANT

## 2022-04-20 PROCEDURE — 1160F PR REVIEW ALL MEDS BY PRESCRIBER/CLIN PHARMACIST DOCUMENTED: ICD-10-PCS | Mod: CPTII,S$GLB,, | Performed by: PHYSICIAN ASSISTANT

## 2022-04-20 PROCEDURE — 3008F BODY MASS INDEX DOCD: CPT | Mod: CPTII,S$GLB,, | Performed by: PHYSICIAN ASSISTANT

## 2022-04-20 PROCEDURE — 3079F PR MOST RECENT DIASTOLIC BLOOD PRESSURE 80-89 MM HG: ICD-10-PCS | Mod: CPTII,S$GLB,, | Performed by: PHYSICIAN ASSISTANT

## 2022-04-20 PROCEDURE — 99999 PR PBB SHADOW E&M-EST. PATIENT-LVL III: ICD-10-PCS | Mod: PBBFAC,,, | Performed by: PHYSICIAN ASSISTANT

## 2022-04-20 PROCEDURE — 99214 OFFICE O/P EST MOD 30 MIN: CPT | Mod: S$GLB,,, | Performed by: PHYSICIAN ASSISTANT

## 2022-04-20 PROCEDURE — 99999 PR PBB SHADOW E&M-EST. PATIENT-LVL III: CPT | Mod: PBBFAC,,, | Performed by: PHYSICIAN ASSISTANT

## 2022-04-20 PROCEDURE — 3074F PR MOST RECENT SYSTOLIC BLOOD PRESSURE < 130 MM HG: ICD-10-PCS | Mod: CPTII,S$GLB,, | Performed by: PHYSICIAN ASSISTANT

## 2022-04-20 PROCEDURE — 3074F SYST BP LT 130 MM HG: CPT | Mod: CPTII,S$GLB,, | Performed by: PHYSICIAN ASSISTANT

## 2022-04-20 PROCEDURE — 99214 PR OFFICE/OUTPT VISIT, EST, LEVL IV, 30-39 MIN: ICD-10-PCS | Mod: S$GLB,,, | Performed by: PHYSICIAN ASSISTANT

## 2022-04-20 PROCEDURE — 1159F MED LIST DOCD IN RCRD: CPT | Mod: CPTII,S$GLB,, | Performed by: PHYSICIAN ASSISTANT

## 2022-04-20 RX ORDER — ATOMOXETINE 40 MG/1
40 CAPSULE ORAL DAILY
Qty: 30 CAPSULE | Refills: 0 | Status: SHIPPED | OUTPATIENT
Start: 2022-04-20 | End: 2022-05-12

## 2022-04-20 NOTE — PROGRESS NOTES
Outpatient Psychiatry Follow-Up Visit (MD/NP)    4/20/2022    Clinical Status of Patient:  Outpatient (Ambulatory)    Chief Complaint:  Almas Colon is a 22 y.o. female who presents today for follow-up of depression, anxiety and attention problems.  Met with patient. Pt seen with PA student, Yoana Hernandez.     Interval History and Content of Current Session:  Interim Events/Subjective Report/Content of Current Session:  Almas is seen today for medication follow up. She states she is doing well. Doing better now. Found sertraline was causing her to hold weight in her midsection which was causing her to be upset. Reports feeling paranoid on bupropion. She tried to stop vyvanse while on bupropion to see if paranoia would subside but it did not. Now, she is only taking vyvanse and feels fine. She is not currently working. She worked for [employer] for a bit but stopped when gas prices increased. 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 She may try to apply to some engineering jobs in the future. When considering other career options, the medical field interest her but she is still unsure. She reports finances are okay and she is currently living with her mom. Day to day task include cleaning the house and going to the gym. She reports taking vyvanse intermittently. She is interesting in trying atomoxetine. She reports sleep and appetite are fine. She does not drink alcohol, use nicotine or any other substances. Denies SI/HI. No other concerns today.    PHQ9: 1, not at all    FROM PREVIOUS HPI  Almas is seen today for medication follow up. She states she is doing pretty well. 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 She states it's fun and gives her something to do. Has applied at [employer] and out of state jobs in [place] and [place]. She states that mood wise, she has  "been great. Sertraline has americo helping significantly with her mood. Anxiety is well controlled. She has been eating healthy, taking vitamins, and going to the gym. Her appetite is "okay", she is not hungry all the time. Some times has to force herself to eat a meal. She has weight gain of 5lbs. She has a boyfriend who is doing well and just applied to [employer]. She has upcoming therapy appointment tomorrow at 11am. She is tolerating low dose Vyvanse well. Declines need for medication adjustments. Denies SI/HI. No other concerns today.    PHQ9: 4, somewhat difficult    GAD7 4/20/2022 1/20/2022 1/19/2022   1. Feeling nervous, anxious, or on edge? 0 0 0   2. Not being able to stop or control worrying? 0 0 0   3. Worrying too much about different things? 0 0 0   4. Trouble relaxing? 0 0 0   5. Being so restless that it is hard to sit still? 0 0 0   6. Becoming easily annoyed or irritable? 0 0 0   7. Feeling afraid as if something awful might happen? 0 0 0   8. If you checked off any problems, how difficult have these problems made it for you to do your work, take care of things at home, or get along with other people? 0 0 0   MATHEW-7 Score 0 0 0     PHQ9 4/20/2022   Little interest or pleasure in doing things: Not at all   Feeling down, depressed or hopeless: Not at all   Trouble falling asleep, staying asleep, or sleeping too much: Several days   Feeling tired or having little energy: Not at all   Poor appetite or overeating: Not at all   Feeling bad about yourself- or that you are a failure or have let yourself or family down Not at all   Trouble concentrating on things, such as reading the newspaper or watching television: Not at all   Moving or speaking so slowly that other people could have noticed. Or the opposite- being so fidgety or restless that you have been moving around a lot more than usual: Not at all   Thoughts that you would be better off dead or hurting yourself in some way: Not at all   If you " indicated you have experienced any of the aforementioned problems, how difficult have these problems made it for you to do your work, take care of things at home or get along with other people? Not difficult at all   Total Score 1         Review of Systems   · PSYCHIATRIC: Pertinant items are noted in the narrative.  · RESPIRATORY: No shortness of breath.  · CARDIOVASCULAR: No tachycardia or chest pain.  · GASTROINTESTINAL: No nausea, vomiting, pain, constipation or diarrhea.    Past Medical, Family and Social History: The patient's past medical, family and social history have been reviewed and updated as appropriate within the electronic medical record - see encounter notes.    Compliance: yes    Side effects: paranoia with bupropion    Risk Parameters:  Patient reports no suicidal ideation  Patient reports no homicidal ideation  Patient reports no self-injurious behavior  Patient reports no violent behavior    Exam (detailed: at least 9 elements; comprehensive: all 15 elements)   Constitutional  Vitals:  Most recent vital signs, dated less than 90 days prior to this appointment, were reviewed.   Vitals:    04/20/22 1107   BP: 126/88   Pulse: 102      General:  unremarkable, age appropriate, casually dressed     Musculoskeletal  Muscle Strength/Tone:  no dyskinesia   Gait & Station:  non-ataxic     Psychiatric  Speech:  no latency; no press   Mood & Affect:  good  congruent and appropriate   Thought Process:  normal and logical   Associations:  intact   Thought Content:  normal, no suicidality, no homicidality, delusions, or paranoia   Insight:  intact   Judgement: behavior is adequate to circumstances   Orientation:  grossly intact   Memory: intact for content of interview   Language: grossly intact   Attention Span & Concentration:  able to focus   Fund of Knowledge:  intact and appropriate to age and level of education     Assessment and Diagnosis   Status/Progress: Based on the examination today, the patient's  problem(s) is/are improved.  New problems have not been presented today.   Co-morbidities, Diagnostic uncertainty and Lack of compliance are not complicating management of the primary condition.     General Impression:   Adjustment disorder  ADHD, combined presentation type    Intervention/Counseling/Treatment Plan   · Medication Management: Continue current medications. The risks and benefits of medication were discussed with the patient.  · Counseling provided with patient as follows: importance of compliance with chosen treatment options was emphasized, risks and benefits of treatment options, including medications, were discussed with the patient, risk factor reduction, prognosis    Almas is doing well at this time with Vyvanse 20 mg prn. She stopped taking sertraline and bupropion due to negative side effects. Based on assessment today:     Trial Strattera 40mg for ADHD.  Hold. Vyvanse 20 mg daily for inattention/concentration deficits.    Please go to emergency department if feeling as though you are a harm to yourself or others or if you are in crisis.     Please call the clinic to report any worsening of symptoms or problems associated with medication.    Cardiovascular events: [US Boxed Warning]: Use has been associated with serious cardiovascular events including sudden death in patients with preexisting structural cardiac abnormalities or other serious heart problems (sudden death in children and adolescents; sudden death, stroke, and MI in adults). Consistent with other studies, a large retrospective cohort study involving 1,200,438 children, adolescents, and young adults (aged 2 to 24 years) prescribed methylphenidate, dexmethylphenidate, dextroamphetamines, amphetamine salts, pemoline, or atomoxetine found no evidence that current use of an ADHD medication increased risk for sudden cardiac death, acute MI, or stroke (Sin 2011). Stimulants should be avoided in patients with known structural cardiac  abnormalities, cardiomyopathy, serious heart rhythm abnormalities, coronary artery disease, Marfan syndrome, or other serious cardiac problems. Some products are contraindicated in patients with moderate or severe hypertension. Prior to initiating stimulant, assess medical history and family history of sudden death or ventricular arrhythmia; conduct a physical exam to assess for cardiac disease; patients should receive further evaluation if findings suggest cardiac disease, such as ECG and echocardiogram. Promptly conduct cardiac evaluation in patients who develop exertional chest pain, unexplained syncope, or any other symptoms of cardiac disease during stimulant treatment.      Return to Clinic: 1 month, as needed

## 2022-04-20 NOTE — PATIENT INSTRUCTIONS
Trial Strattera 40mg for ADHD    Stop Vyvanse    Please go to emergency department if feeling as though you are a harm to yourself or others or if you are in crisis. Please call the clinic to report any worsening of symptoms or problems associated with medication.

## 2022-05-17 ENCOUNTER — OFFICE VISIT (OUTPATIENT)
Dept: PSYCHIATRY | Facility: CLINIC | Age: 22
End: 2022-05-17
Payer: COMMERCIAL

## 2022-05-17 DIAGNOSIS — F43.23 ADJUSTMENT DISORDER WITH MIXED ANXIETY AND DEPRESSED MOOD: ICD-10-CM

## 2022-05-17 DIAGNOSIS — F90.2 ATTENTION DEFICIT HYPERACTIVITY DISORDER (ADHD), COMBINED TYPE: Primary | ICD-10-CM

## 2022-05-17 PROCEDURE — 1160F RVW MEDS BY RX/DR IN RCRD: CPT | Mod: CPTII,95,, | Performed by: PHYSICIAN ASSISTANT

## 2022-05-17 PROCEDURE — 1159F MED LIST DOCD IN RCRD: CPT | Mod: CPTII,95,, | Performed by: PHYSICIAN ASSISTANT

## 2022-05-17 PROCEDURE — 1159F PR MEDICATION LIST DOCUMENTED IN MEDICAL RECORD: ICD-10-PCS | Mod: CPTII,95,, | Performed by: PHYSICIAN ASSISTANT

## 2022-05-17 PROCEDURE — 99214 OFFICE O/P EST MOD 30 MIN: CPT | Mod: 95,,, | Performed by: PHYSICIAN ASSISTANT

## 2022-05-17 PROCEDURE — 99214 PR OFFICE/OUTPT VISIT, EST, LEVL IV, 30-39 MIN: ICD-10-PCS | Mod: 95,,, | Performed by: PHYSICIAN ASSISTANT

## 2022-05-17 PROCEDURE — 1160F PR REVIEW ALL MEDS BY PRESCRIBER/CLIN PHARMACIST DOCUMENTED: ICD-10-PCS | Mod: CPTII,95,, | Performed by: PHYSICIAN ASSISTANT

## 2022-05-17 NOTE — PROGRESS NOTES
The patient location is: her home in [place], [place]  The chief complaint leading to consultation is: ADHD    Visit type: audiovisual    Face to Face time with patient: 20  30 minutes of total time spent on the encounter, which includes face to face time and non-face to face time preparing to see the patient (eg, review of tests), Obtaining and/or reviewing separately obtained history, Documenting clinical information in the electronic or other health record, Independently interpreting results (not separately reported) and communicating results to the patient/family/caregiver, or Care coordination (not separately reported).     Each patient to whom he or she provides medical services by telemedicine is:  (1) informed of the relationship between the physician and patient and the respective role of any other health care provider with respect to management of the patient; and (2) notified that he or she may decline to receive medical services by telemedicine and may withdraw from such care at any time.      Outpatient Psychiatry Follow-Up Visit (MD/NP)    5/17/2022    Clinical Status of Patient:  Outpatient (Ambulatory)    Chief Complaint:  Almas Chowdhury Isaiah is a 22 y.o. female who presents today for follow-up of depression, anxiety and attention problems.  Met with patient.     Interval History and Content of Current Session:  Interim Events/Subjective Report/Content of Current Session:  Almas is seen today for medication follow-up.  She reports that she is doing okay.  However, she is feeling very fatigued.  She is unsure what this is from.  It has worsened in the past couple of weeks but not immediately after discontinuing Vyvanse.  She does feel that it is hard to function at this time.  Has been on Strattera about one month, overall does not feel like it is helpful.  She feels like she has not been able to get much done, having a difficult time concentrating, cannot stay on task.  Trying to apply for jobs  but cannot sit down to even look for job.  She gets distracted with anything in the house.  She is sleeping at night, over sleeping.  She states that her appetite is poor, was better with Vyvanse.  She has been getting to the gym.  She is not drinking alcohol, using nicotine, using other substances.  Her and her boyfriend did break up which she overall feels is for the best for her.  She is focusing on herself.  Discussed that she potentially had an allergy to Adderall when she was younger with puffy eyes and shortness of breath.  She does have Vyvanse at home so she will resume this medication and let us know how she is doing.  She denies suicidal or homicidal ideation.  No other complaints today.    FROM PREVIOUS HPI  Almas is seen today for medication follow up. She states she is doing well. Doing better now. Found sertraline was causing her to hold weight in her midsection which was causing her to be upset. Reports feeling paranoid on bupropion. She tried to stop vyvanse while on bupropion to see if paranoia would subside but it did not. Now, she is only taking vyvanse and feels fine. She is not currently working. She worked for [employer] for a bit but stopped when gas prices increased. 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 She may try to apply to some engineering jobs in the future. When considering other career options, the medical field interest her but she is still unsure. She reports finances are okay and she is currently living with her mom. Day to day task include cleaning the house and going to the gym. She reports taking vyvanse intermittently. She is interesting in trying atomoxetine. She reports sleep and appetite are fine. She does not drink alcohol, use nicotine or any other substances. Denies SI/HI. No other concerns today.    PHQ9: 1, not at all        GAD7 5/17/2022 4/20/2022 1/20/2022   1. Feeling nervous, anxious, or on edge? 0 0  0   2. Not being able to stop or control worrying? 0 0 0   3. Worrying too much about different things? 0 0 0   4. Trouble relaxing? 0 0 0   5. Being so restless that it is hard to sit still? 0 0 0   6. Becoming easily annoyed or irritable? 1 0 0   7. Feeling afraid as if something awful might happen? 0 0 0   8. If you checked off any problems, how difficult have these problems made it for you to do your work, take care of things at home, or get along with other people? 1 0 0   MATHEW-7 Score 1 0 0     PHQ9 4/20/2022   Little interest or pleasure in doing things: Not at all   Feeling down, depressed or hopeless: Not at all   Trouble falling asleep, staying asleep, or sleeping too much: Several days   Feeling tired or having little energy: Not at all   Poor appetite or overeating: Not at all   Feeling bad about yourself- or that you are a failure or have let yourself or family down Not at all   Trouble concentrating on things, such as reading the newspaper or watching television: Not at all   Moving or speaking so slowly that other people could have noticed. Or the opposite- being so fidgety or restless that you have been moving around a lot more than usual: Not at all   Thoughts that you would be better off dead or hurting yourself in some way: Not at all   If you indicated you have experienced any of the aforementioned problems, how difficult have these problems made it for you to do your work, take care of things at home or get along with other people? Not difficult at all   Total Score 1         Review of Systems   · PSYCHIATRIC: Pertinant items are noted in the narrative.  · RESPIRATORY: No shortness of breath.  · CARDIOVASCULAR: No tachycardia or chest pain.  · GASTROINTESTINAL: No nausea, vomiting, pain, constipation or diarrhea.    Past Medical, Family and Social History: The patient's past medical, family and social history have been reviewed and updated as appropriate within the electronic medical record - see  encounter notes.    Compliance: yes    Side effects: fatigue    Risk Parameters:  Patient reports no suicidal ideation  Patient reports no homicidal ideation  Patient reports no self-injurious behavior  Patient reports no violent behavior    Exam (detailed: at least 9 elements; comprehensive: all 15 elements)   Constitutional  Vitals:  Most recent vital signs, dated less than 90 days prior to this appointment, were reviewed.   There were no vitals filed for this visit.   General:  unremarkable, age appropriate, casually dressed     Musculoskeletal  Muscle Strength/Tone:  no dyskinesia   Gait & Station:  virtual visit     Psychiatric  Speech:  no latency; no press   Mood & Affect:  good  congruent and appropriate   Thought Process:  normal and logical   Associations:  intact   Thought Content:  normal, no suicidality, no homicidality, delusions, or paranoia   Insight:  intact   Judgement: behavior is adequate to circumstances   Orientation:  grossly intact   Memory: intact for content of interview   Language: grossly intact   Attention Span & Concentration:  able to focus   Fund of Knowledge:  intact and appropriate to age and level of education     Assessment and Diagnosis   Status/Progress: Based on the examination today, the patient's problem(s) is/are improved.  New problems have not been presented today.   Co-morbidities, Diagnostic uncertainty and Lack of compliance are not complicating management of the primary condition.     General Impression:   Adjustment disorder  ADHD, combined presentation type    Intervention/Counseling/Treatment Plan   · Medication Management: Continue current medications. The risks and benefits of medication were discussed with the patient.  · Counseling provided with patient as follows: importance of compliance with chosen treatment options was emphasized, risks and benefits of treatment options, including medications, were discussed with the patient, risk factor reduction,  prognosis    Almas is not finding benefit from Strattera. Would like to resume stimulant therapy. Based on assessment today:     Discontinue Strattera 40mg for ADHD.  Resume Vyvanse 20 mg daily for inattention/concentration deficits.    Please go to emergency department if feeling as though you are a harm to yourself or others or if you are in crisis.     Please call the clinic to report any worsening of symptoms or problems associated with medication.    Cardiovascular events: [US Boxed Warning]: Use has been associated with serious cardiovascular events including sudden death in patients with preexisting structural cardiac abnormalities or other serious heart problems (sudden death in children and adolescents; sudden death, stroke, and MI in adults). Consistent with other studies, a large retrospective cohort study involving 1,200,438 children, adolescents, and young adults (aged 2 to 24 years) prescribed methylphenidate, dexmethylphenidate, dextroamphetamines, amphetamine salts, pemoline, or atomoxetine found no evidence that current use of an ADHD medication increased risk for sudden cardiac death, acute MI, or stroke (Sin 2011). Stimulants should be avoided in patients with known structural cardiac abnormalities, cardiomyopathy, serious heart rhythm abnormalities, coronary artery disease, Marfan syndrome, or other serious cardiac problems. Some products are contraindicated in patients with moderate or severe hypertension. Prior to initiating stimulant, assess medical history and family history of sudden death or ventricular arrhythmia; conduct a physical exam to assess for cardiac disease; patients should receive further evaluation if findings suggest cardiac disease, such as ECG and echocardiogram. Promptly conduct cardiac evaluation in patients who develop exertional chest pain, unexplained syncope, or any other symptoms of cardiac disease during stimulant treatment.      Return to Clinic: 1 month, as  needed

## 2022-06-22 ENCOUNTER — OFFICE VISIT (OUTPATIENT)
Dept: PSYCHIATRY | Facility: CLINIC | Age: 22
End: 2022-06-22
Payer: COMMERCIAL

## 2022-06-22 ENCOUNTER — TELEPHONE (OUTPATIENT)
Dept: PSYCHIATRY | Facility: CLINIC | Age: 22
End: 2022-06-22
Payer: COMMERCIAL

## 2022-06-22 DIAGNOSIS — F43.23 ADJUSTMENT DISORDER WITH MIXED ANXIETY AND DEPRESSED MOOD: Primary | ICD-10-CM

## 2022-06-22 DIAGNOSIS — F90.2 ATTENTION DEFICIT HYPERACTIVITY DISORDER (ADHD), COMBINED TYPE: ICD-10-CM

## 2022-06-22 PROCEDURE — 1160F PR REVIEW ALL MEDS BY PRESCRIBER/CLIN PHARMACIST DOCUMENTED: ICD-10-PCS | Mod: CPTII,95,, | Performed by: PHYSICIAN ASSISTANT

## 2022-06-22 PROCEDURE — 1159F MED LIST DOCD IN RCRD: CPT | Mod: CPTII,95,, | Performed by: PHYSICIAN ASSISTANT

## 2022-06-22 PROCEDURE — 99214 OFFICE O/P EST MOD 30 MIN: CPT | Mod: 95,,, | Performed by: PHYSICIAN ASSISTANT

## 2022-06-22 PROCEDURE — 1160F RVW MEDS BY RX/DR IN RCRD: CPT | Mod: CPTII,95,, | Performed by: PHYSICIAN ASSISTANT

## 2022-06-22 PROCEDURE — 1159F PR MEDICATION LIST DOCUMENTED IN MEDICAL RECORD: ICD-10-PCS | Mod: CPTII,95,, | Performed by: PHYSICIAN ASSISTANT

## 2022-06-22 PROCEDURE — 99214 PR OFFICE/OUTPT VISIT, EST, LEVL IV, 30-39 MIN: ICD-10-PCS | Mod: 95,,, | Performed by: PHYSICIAN ASSISTANT

## 2022-06-22 RX ORDER — FLUOXETINE 10 MG/1
10 CAPSULE ORAL DAILY
Qty: 30 CAPSULE | Refills: 0 | Status: SHIPPED | OUTPATIENT
Start: 2022-06-22 | End: 2022-07-18

## 2022-06-22 RX ORDER — LISDEXAMFETAMINE DIMESYLATE 30 MG/1
30 CAPSULE ORAL EVERY MORNING
Qty: 30 CAPSULE | Refills: 0 | Status: SHIPPED | OUTPATIENT
Start: 2022-06-22 | End: 2022-07-29 | Stop reason: SDUPTHER

## 2022-06-22 NOTE — PROGRESS NOTES
The patient location is: her home in [place], [place]  The chief complaint leading to consultation is: ADHD    Visit type: audiovisual    Face to Face time with patient: 12  30 minutes of total time spent on the encounter, which includes face to face time and non-face to face time preparing to see the patient (eg, review of tests), Obtaining and/or reviewing separately obtained history, Documenting clinical information in the electronic or other health record, Independently interpreting results (not separately reported) and communicating results to the patient/family/caregiver, or Care coordination (not separately reported).     Each patient to whom he or she provides medical services by telemedicine is:  (1) informed of the relationship between the physician and patient and the respective role of any other health care provider with respect to management of the patient; and (2) notified that he or she may decline to receive medical services by telemedicine and may withdraw from such care at any time.      Outpatient Psychiatry Follow-Up Visit (MD/NP)    6/22/2022    Clinical Status of Patient:  Outpatient (Ambulatory)    Chief Complaint:  Almas Chowdhury Isaiah is a 22 y.o. female who presents today for follow-up of depression, anxiety and attention problems.  Met with patient.     Interval History and Content of Current Session:  Interim Events/Subjective Report/Content of Current Session:  Almas is seen today for medication follow up. She is seen virtually today. She had a phone interview for a job in [place] as an  which went well. She is very excited about the potential for a new job. She should hear from the next week. She has been walking in the morning and working out in the evenings. She is sleeping well. Doing well with Vyvanse at this time. She interested in a new medication for anxiety. Eating healthy. Appetite is adequate. Weight has been stable. Personal life is going well. She  denies SI/HI. No other complaints today.     FROM PREVIOUS HPI  Almas is seen today for medication follow-up.  She reports that she is doing okay.  However, she is feeling very fatigued.  She is unsure what this is from.  It has worsened in the past couple of weeks but not immediately after discontinuing Vyvanse.  She does feel that it is hard to function at this time.  Has been on Strattera about one month, overall does not feel like it is helpful.  She feels like she has not been able to get much done, having a difficult time concentrating, cannot stay on task.  Trying to apply for jobs but cannot sit down to even look for job.  She gets distracted with anything in the house.  She is sleeping at night, over sleeping.  She states that her appetite is poor, was better with Vyvanse.  She has been getting to the gym.  She is not drinking alcohol, using nicotine, using other substances.  Her and her boyfriend did break up which she overall feels is for the best for her.  She is focusing on herself.  Discussed that she potentially had an allergy to Adderall when she was younger with puffy eyes and shortness of breath.  She does have Vyvanse at home so she will resume this medication and let us know how she is doing.  She denies suicidal or homicidal ideation.  No other complaints today.          GAD7 6/22/2022 5/17/2022 4/20/2022   1. Feeling nervous, anxious, or on edge? 1 0 0   2. Not being able to stop or control worrying? 1 0 0   3. Worrying too much about different things? 0 0 0   4. Trouble relaxing? 0 0 0   5. Being so restless that it is hard to sit still? 0 0 0   6. Becoming easily annoyed or irritable? 0 1 0   7. Feeling afraid as if something awful might happen? 0 0 0   8. If you checked off any problems, how difficult have these problems made it for you to do your work, take care of things at home, or get along with other people? 1 1 0   MATHEW-7 Score 2 1 0     PHQ9 4/20/2022   Little interest or pleasure in  doing things: Not at all   Feeling down, depressed or hopeless: Not at all   Trouble falling asleep, staying asleep, or sleeping too much: Several days   Feeling tired or having little energy: Not at all   Poor appetite or overeating: Not at all   Feeling bad about yourself- or that you are a failure or have let yourself or family down Not at all   Trouble concentrating on things, such as reading the newspaper or watching television: Not at all   Moving or speaking so slowly that other people could have noticed. Or the opposite- being so fidgety or restless that you have been moving around a lot more than usual: Not at all   Thoughts that you would be better off dead or hurting yourself in some way: Not at all   If you indicated you have experienced any of the aforementioned problems, how difficult have these problems made it for you to do your work, take care of things at home or get along with other people? Not difficult at all   Total Score 1         Review of Systems   · PSYCHIATRIC: Pertinant items are noted in the narrative.  · RESPIRATORY: No shortness of breath.  · CARDIOVASCULAR: No tachycardia or chest pain.  · GASTROINTESTINAL: No nausea, vomiting, pain, constipation or diarrhea.    Past Medical, Family and Social History: The patient's past medical, family and social history have been reviewed and updated as appropriate within the electronic medical record - see encounter notes.    Compliance: yes    Side effects: none    Risk Parameters:  Patient reports no suicidal ideation  Patient reports no homicidal ideation  Patient reports no self-injurious behavior  Patient reports no violent behavior    Exam (detailed: at least 9 elements; comprehensive: all 15 elements)   Constitutional  Vitals:  Most recent vital signs, dated less than 90 days prior to this appointment, were reviewed.   There were no vitals filed for this visit.   General:  unremarkable, age appropriate, casually dressed      Musculoskeletal  Muscle Strength/Tone:  no dyskinesia   Gait & Station:  virtual visit     Psychiatric  Speech:  no latency; no press   Mood & Affect:  good  congruent and appropriate   Thought Process:  normal and logical   Associations:  intact   Thought Content:  normal, no suicidality, no homicidality, delusions, or paranoia   Insight:  intact   Judgement: behavior is adequate to circumstances   Orientation:  grossly intact   Memory: intact for content of interview   Language: grossly intact   Attention Span & Concentration:  able to focus   Fund of Knowledge:  intact and appropriate to age and level of education     Assessment and Diagnosis   Status/Progress: Based on the examination today, the patient's problem(s) is/are improved.  New problems have not been presented today.   Co-morbidities, Diagnostic uncertainty and Lack of compliance are not complicating management of the primary condition.     General Impression:   Adjustment disorder  ADHD, combined presentation type    Intervention/Counseling/Treatment Plan   · Medication Management: Continue current medications. The risks and benefits of medication were discussed with the patient.  · Counseling provided with patient as follows: importance of compliance with chosen treatment options was emphasized, risks and benefits of treatment options, including medications, were discussed with the patient, risk factor reduction, prognosis    Almas is seen today for medication follow up.     Increase Vyvanse to 30 mg daily for inattention/concentration deficits.  Trial low dose Prozac 10mg daily for mood/anxiety.     Please go to emergency department if feeling as though you are a harm to yourself or others or if you are in crisis.     Please call the clinic to report any worsening of symptoms or problems associated with medication.    Cardiovascular events: [US Boxed Warning]: Use has been associated with serious cardiovascular events including sudden death in  patients with preexisting structural cardiac abnormalities or other serious heart problems (sudden death in children and adolescents; sudden death, stroke, and MI in adults). Consistent with other studies, a large retrospective cohort study involving 1,200,438 children, adolescents, and young adults (aged 2 to 24 years) prescribed methylphenidate, dexmethylphenidate, dextroamphetamines, amphetamine salts, pemoline, or atomoxetine found no evidence that current use of an ADHD medication increased risk for sudden cardiac death, acute MI, or stroke (Sin 2011). Stimulants should be avoided in patients with known structural cardiac abnormalities, cardiomyopathy, serious heart rhythm abnormalities, coronary artery disease, Marfan syndrome, or other serious cardiac problems. Some products are contraindicated in patients with moderate or severe hypertension. Prior to initiating stimulant, assess medical history and family history of sudden death or ventricular arrhythmia; conduct a physical exam to assess for cardiac disease; patients should receive further evaluation if findings suggest cardiac disease, such as ECG and echocardiogram. Promptly conduct cardiac evaluation in patients who develop exertional chest pain, unexplained syncope, or any other symptoms of cardiac disease during stimulant treatment.      Return to Clinic: 1 month, as needed

## 2022-06-22 NOTE — TELEPHONE ENCOUNTER
Tried calling patient to set up f/u appt, received message that subscriber could not be reached and to try again later. Sent portal message.

## 2022-06-24 ENCOUNTER — PATIENT MESSAGE (OUTPATIENT)
Dept: PSYCHIATRY | Facility: CLINIC | Age: 22
End: 2022-06-24
Payer: COMMERCIAL

## 2022-06-24 ENCOUNTER — PATIENT MESSAGE (OUTPATIENT)
Dept: FAMILY MEDICINE | Facility: CLINIC | Age: 22
End: 2022-06-24
Payer: COMMERCIAL

## 2022-06-24 DIAGNOSIS — Z30.9 ENCOUNTER FOR CONTRACEPTIVE MANAGEMENT, UNSPECIFIED TYPE: Primary | ICD-10-CM

## 2022-07-18 DIAGNOSIS — F43.23 ADJUSTMENT DISORDER WITH MIXED ANXIETY AND DEPRESSED MOOD: ICD-10-CM

## 2022-07-18 RX ORDER — FLUOXETINE 10 MG/1
CAPSULE ORAL
Qty: 90 CAPSULE | Refills: 0 | Status: SHIPPED | OUTPATIENT
Start: 2022-07-18 | End: 2022-09-19

## 2022-09-16 ENCOUNTER — OFFICE VISIT (OUTPATIENT)
Dept: PSYCHIATRY | Facility: CLINIC | Age: 22
End: 2022-09-16
Payer: COMMERCIAL

## 2022-09-16 VITALS
SYSTOLIC BLOOD PRESSURE: 128 MMHG | BODY MASS INDEX: 30.31 KG/M2 | HEART RATE: 109 BPM | HEIGHT: 64 IN | WEIGHT: 177.56 LBS | DIASTOLIC BLOOD PRESSURE: 87 MMHG

## 2022-09-16 DIAGNOSIS — F90.2 ATTENTION DEFICIT HYPERACTIVITY DISORDER (ADHD), COMBINED TYPE: ICD-10-CM

## 2022-09-16 DIAGNOSIS — F43.23 ADJUSTMENT DISORDER WITH MIXED ANXIETY AND DEPRESSED MOOD: Primary | ICD-10-CM

## 2022-09-16 PROCEDURE — 3008F PR BODY MASS INDEX (BMI) DOCUMENTED: ICD-10-PCS | Mod: CPTII,S$GLB,, | Performed by: PHYSICIAN ASSISTANT

## 2022-09-16 PROCEDURE — 99214 OFFICE O/P EST MOD 30 MIN: CPT | Mod: S$GLB,,, | Performed by: PHYSICIAN ASSISTANT

## 2022-09-16 PROCEDURE — 3008F BODY MASS INDEX DOCD: CPT | Mod: CPTII,S$GLB,, | Performed by: PHYSICIAN ASSISTANT

## 2022-09-16 PROCEDURE — 3074F SYST BP LT 130 MM HG: CPT | Mod: CPTII,S$GLB,, | Performed by: PHYSICIAN ASSISTANT

## 2022-09-16 PROCEDURE — 99214 PR OFFICE/OUTPT VISIT, EST, LEVL IV, 30-39 MIN: ICD-10-PCS | Mod: S$GLB,,, | Performed by: PHYSICIAN ASSISTANT

## 2022-09-16 PROCEDURE — 1159F PR MEDICATION LIST DOCUMENTED IN MEDICAL RECORD: ICD-10-PCS | Mod: CPTII,S$GLB,, | Performed by: PHYSICIAN ASSISTANT

## 2022-09-16 PROCEDURE — 1160F PR REVIEW ALL MEDS BY PRESCRIBER/CLIN PHARMACIST DOCUMENTED: ICD-10-PCS | Mod: CPTII,S$GLB,, | Performed by: PHYSICIAN ASSISTANT

## 2022-09-16 PROCEDURE — 1160F RVW MEDS BY RX/DR IN RCRD: CPT | Mod: CPTII,S$GLB,, | Performed by: PHYSICIAN ASSISTANT

## 2022-09-16 PROCEDURE — 99999 PR PBB SHADOW E&M-EST. PATIENT-LVL III: ICD-10-PCS | Mod: PBBFAC,,, | Performed by: PHYSICIAN ASSISTANT

## 2022-09-16 PROCEDURE — 3074F PR MOST RECENT SYSTOLIC BLOOD PRESSURE < 130 MM HG: ICD-10-PCS | Mod: CPTII,S$GLB,, | Performed by: PHYSICIAN ASSISTANT

## 2022-09-16 PROCEDURE — 3079F PR MOST RECENT DIASTOLIC BLOOD PRESSURE 80-89 MM HG: ICD-10-PCS | Mod: CPTII,S$GLB,, | Performed by: PHYSICIAN ASSISTANT

## 2022-09-16 PROCEDURE — 1159F MED LIST DOCD IN RCRD: CPT | Mod: CPTII,S$GLB,, | Performed by: PHYSICIAN ASSISTANT

## 2022-09-16 PROCEDURE — 3079F DIAST BP 80-89 MM HG: CPT | Mod: CPTII,S$GLB,, | Performed by: PHYSICIAN ASSISTANT

## 2022-09-16 PROCEDURE — 99999 PR PBB SHADOW E&M-EST. PATIENT-LVL III: CPT | Mod: PBBFAC,,, | Performed by: PHYSICIAN ASSISTANT

## 2022-09-16 NOTE — PROGRESS NOTES
Outpatient Psychiatry Follow-Up Visit (MD/NP)    9/16/2022    Clinical Status of Patient:  Outpatient (Ambulatory)    Chief Complaint:  Almas Colon is a 22 y.o. female who presents today for follow-up of depression, anxiety and attention problems.  Met with patient. Sanam FUNES is present for this visit.    Interval History and Content of Current Session:  Interim Events/Subjective Report/Content of Current Session:  Almas is seen today for medication follow-up. Reports she is doing great. 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 In her free time, she goes to the gym. Reports positive results with Vyvanse. She is no longer taking fluoxetine - did not find benefit. Sleep and appetite are adequate. Mood overall is good. She is no longer dating but remains good friends with her ex-boyfriend. Reports some bloating after she works out, perhaps due to increased protein in diet. Agreed to monitor. No SI/HI. No other complaints.    FROM PREVIOUS HPI  Almas is seen today for medication follow up. She is seen virtually today. She had a phone interview for a job in [place] as an  which went well. She is very excited about the potential for a new job. She should hear from the next week. She has been walking in the morning and working out in the evenings. She is sleeping well. Doing well with Vyvanse at this time. She interested in a new medication for anxiety. Eating healthy. Appetite is adequate. Weight has been stable. Personal life is going well. She denies SI/HI. No other complaints today.       GAD7 9/16/2022 6/22/2022 5/17/2022   1. Feeling nervous, anxious, or on edge? 0 1 0   2. Not being able to stop or control worrying? 0 1 0   3. Worrying too much about different things? 0 0 0   4. Trouble relaxing? 0 0 0   5. Being so restless that it is hard to sit still? 0 0 0   6. Becoming easily annoyed or irritable? 1 0 1   7. Feeling afraid as if something  awful might happen? 0 0 0   8. If you checked off any problems, how difficult have these problems made it for you to do your work, take care of things at home, or get along with other people? 0 1 1   MATHEW-7 Score 1 2 1     PHQ9 9/16/2022   Little interest or pleasure in doing things: Not at all   Feeling down, depressed or hopeless: Not at all   Trouble falling asleep, staying asleep, or sleeping too much: Not at all   Feeling tired or having little energy: Not at all   Poor appetite or overeating: Not at all   Feeling bad about yourself- or that you are a failure or have let yourself or family down Not at all   Trouble concentrating on things, such as reading the newspaper or watching television: Not at all   Moving or speaking so slowly that other people could have noticed. Or the opposite- being so fidgety or restless that you have been moving around a lot more than usual: Not at all   Thoughts that you would be better off dead or hurting yourself in some way: Not at all   If you indicated you have experienced any of the aforementioned problems, how difficult have these problems made it for you to do your work, take care of things at home or get along with other people? Not difficult at all   Total Score 0         Review of Systems   PSYCHIATRIC: Pertinant items are noted in the narrative.  RESPIRATORY: No shortness of breath.  CARDIOVASCULAR: No tachycardia or chest pain.  GASTROINTESTINAL: No nausea, vomiting, pain, constipation or diarrhea.    Past Medical, Family and Social History: The patient's past medical, family and social history have been reviewed and updated as appropriate within the electronic medical record - see encounter notes.    Compliance: yes    Side effects: none    Risk Parameters:  Patient reports no suicidal ideation  Patient reports no homicidal ideation  Patient reports no self-injurious behavior  Patient reports no violent behavior    Exam (detailed: at least 9 elements; comprehensive:  all 15 elements)   Constitutional  Vitals:  Most recent vital signs, dated less than 90 days prior to this appointment, were reviewed.   Vitals:    09/16/22 1111   BP: 128/87   Pulse: 109      General:  unremarkable, age appropriate, casually dressed     Musculoskeletal  Muscle Strength/Tone:  no dyskinesia   Gait & Station:  non-ataxic     Psychiatric  Speech:  no latency; no press   Mood & Affect:  good  congruent and appropriate   Thought Process:  normal and logical   Associations:  intact   Thought Content:  normal, no suicidality, no homicidality, delusions, or paranoia   Insight:  intact   Judgement: behavior is adequate to circumstances   Orientation:  grossly intact   Memory: intact for content of interview   Language: grossly intact   Attention Span & Concentration:  able to focus   Fund of Knowledge:  intact and appropriate to age and level of education     Assessment and Diagnosis   Status/Progress: Based on the examination today, the patient's problem(s) is/are improved.  New problems have not been presented today.   Co-morbidities, Diagnostic uncertainty and Lack of compliance are not complicating management of the primary condition.     General Impression:   Adjustment disorder  ADHD, combined presentation type    Intervention/Counseling/Treatment Plan   Medication Management: Continue current medications. The risks and benefits of medication were discussed with the patient.  Counseling provided with patient as follows: importance of compliance with chosen treatment options was emphasized, risks and benefits of treatment options, including medications, were discussed with the patient, risk factor reduction, prognosis    Almas is seen today for medication follow up.     Continue Vyvanse 30 mg daily for inattention/concentration deficits.  Discontinue Prozac 10mg daily for mood/anxiety.     Please go to emergency department if feeling as though you are a harm to yourself or others or if you are in  crisis.     Please call the clinic to report any worsening of symptoms or problems associated with medication.    Cardiovascular events: [US Boxed Warning]: Use has been associated with serious cardiovascular events including sudden death in patients with preexisting structural cardiac abnormalities or other serious heart problems (sudden death in children and adolescents; sudden death, stroke, and MI in adults). Consistent with other studies, a large retrospective cohort study involving 1,200,438 children, adolescents, and young adults (aged 2 to 24 years) prescribed methylphenidate, dexmethylphenidate, dextroamphetamines, amphetamine salts, pemoline, or atomoxetine found no evidence that current use of an ADHD medication increased risk for sudden cardiac death, acute MI, or stroke (Sin 2011). Stimulants should be avoided in patients with known structural cardiac abnormalities, cardiomyopathy, serious heart rhythm abnormalities, coronary artery disease, Marfan syndrome, or other serious cardiac problems. Some products are contraindicated in patients with moderate or severe hypertension. Prior to initiating stimulant, assess medical history and family history of sudden death or ventricular arrhythmia; conduct a physical exam to assess for cardiac disease; patients should receive further evaluation if findings suggest cardiac disease, such as ECG and echocardiogram. Promptly conduct cardiac evaluation in patients who develop exertional chest pain, unexplained syncope, or any other symptoms of cardiac disease during stimulant treatment.      Return to Clinic:3 months, as needed.

## 2022-09-30 DIAGNOSIS — L70.0 ACNE VULGARIS: ICD-10-CM

## 2022-09-30 RX ORDER — DOXYCYCLINE 100 MG/1
100 CAPSULE ORAL DAILY
Qty: 30 CAPSULE | Refills: 1 | Status: SHIPPED | OUTPATIENT
Start: 2022-09-30 | End: 2022-10-30

## 2022-09-30 NOTE — TELEPHONE ENCOUNTER
----- Message from Florentino Stockton sent at 9/30/2022  7:41 AM CDT -----  Contact: pt  .Type:  Same Day Appointment Request    Caller is requesting a same day appointment.  Caller declined first available appointment listed below.    Name of Caller:pt  When is the first available appointment?10/24/2022  Symptoms:back pain  Best Call Back Number:[phone]  Additional Information:        
----- Message from Florentino Stockton sent at 9/30/2022  7:42 AM CDT -----  Contact: pt  .Type:  Needs Medical Advice    Who Called: pt     Pharmacy name and phone #:  [id number] - [place] - [address]   Phone: [phone]  Fax:  [phone]  Would the patient rather a call back or a response via [facility]? Call back  Best Call Back Number: [phone]   Additional Information: Pt. Needs a refill on her doxycycline (VIBRAMYCIN) 100 MG Cap       
----- Message from Florentino Stockton sent at 9/30/2022  7:48 AM CDT -----  Contact: pt  .Type:  Patient Requesting Referral    Who Called:pt  Does the patient already have the specialty appointment scheduled?:  If yes, what is the date of that appointment?:  Referral to What Specialty:dermatologist  Reason for Referral:  Does the patient want the referral with a specific physician?:  [facility]  Patient Requesting a Response?:yes  Would the patient rather a call back or a response via [facility]? Call back  Best Call Back Number:[phone]  Additional Information:        
Done. Thanks.  
Patient requesting a refill of doxycycline due to recent outbreak of acne, but also needs dermatology referral as patient previously saw Dr. Ary Calvert a few years ago, and needs to re-establish. Referral and med pended.     She is also aware she is due to have an annual appointment and will schedule at her convenience.   
[id number]

## 2022-10-19 ENCOUNTER — PATIENT MESSAGE (OUTPATIENT)
Dept: OPTOMETRY | Facility: CLINIC | Age: 22
End: 2022-10-19
Payer: COMMERCIAL

## 2022-10-20 ENCOUNTER — OFFICE VISIT (OUTPATIENT)
Dept: DERMATOLOGY | Facility: CLINIC | Age: 22
End: 2022-10-20
Payer: COMMERCIAL

## 2022-10-20 DIAGNOSIS — L30.9 HAND ECZEMA: ICD-10-CM

## 2022-10-20 DIAGNOSIS — L70.0 ACNE VULGARIS: Primary | ICD-10-CM

## 2022-10-20 PROCEDURE — 1160F RVW MEDS BY RX/DR IN RCRD: CPT | Mod: CPTII,95,, | Performed by: DERMATOLOGY

## 2022-10-20 PROCEDURE — 1159F MED LIST DOCD IN RCRD: CPT | Mod: CPTII,95,, | Performed by: DERMATOLOGY

## 2022-10-20 PROCEDURE — 99214 OFFICE O/P EST MOD 30 MIN: CPT | Mod: 95,,, | Performed by: DERMATOLOGY

## 2022-10-20 PROCEDURE — 99214 PR OFFICE/OUTPT VISIT, EST, LEVL IV, 30-39 MIN: ICD-10-PCS | Mod: 95,,, | Performed by: DERMATOLOGY

## 2022-10-20 PROCEDURE — 1160F PR REVIEW ALL MEDS BY PRESCRIBER/CLIN PHARMACIST DOCUMENTED: ICD-10-PCS | Mod: CPTII,95,, | Performed by: DERMATOLOGY

## 2022-10-20 PROCEDURE — 1159F PR MEDICATION LIST DOCUMENTED IN MEDICAL RECORD: ICD-10-PCS | Mod: CPTII,95,, | Performed by: DERMATOLOGY

## 2022-10-20 RX ORDER — TRIAMCINOLONE ACETONIDE 1 MG/G
CREAM TOPICAL
Qty: 80 G | Refills: 1 | Status: SHIPPED | OUTPATIENT
Start: 2022-10-20 | End: 2023-06-01

## 2022-10-20 RX ORDER — SPIRONOLACTONE 100 MG/1
100 TABLET, FILM COATED ORAL DAILY
Qty: 90 TABLET | Refills: 3 | Status: SHIPPED | OUTPATIENT
Start: 2022-10-20 | End: 2023-03-31

## 2022-10-20 RX ORDER — TRETINOIN 0.5 MG/G
CREAM TOPICAL
Qty: 45 G | Refills: 5 | Status: SHIPPED | OUTPATIENT
Start: 2022-10-20 | End: 2023-06-01

## 2022-10-20 NOTE — PATIENT INSTRUCTIONS
PATIENT INFORMATION ON SPIRONOLACTONE    Spironolactone is a successful treatment for hormonal acne and/or excessive hair growth, and most patients do very well with this medication. Often it can take 3-4 months to notice improvement in acne and 6 -12 months to noticed decreased hair growth.    Most patient do not report any side effects. Common side effects include increased urination, menstrual irregularities, breast tenderness, breast enlargement, and headache. Some patients experience fatigue and dizziness. If this does occur, please let us know, and we can adjust how you take the medication.     Please stop this drug if:  You become pregnant. This medication can cause birth defects (although human studies have not confirmed this, animal studies have shown that uncommonly male fetuses' genitals do not develop properly).   You are prescribed the following medications:   Bactrim (trimethoprim-sulfamethoxazole), a common antibiotic  Lithium  Digoxin  You develop a skin rash or blisters.  You notice your heart racing or irregular heartbeats.    There was previously controversy around the use of spironolactone and the development of breast cancer. This is because in animal studies, there was an increased risk of benign (noncancerous) tumors in rats. However, a large (2.4 million women) study in [place] confirms that this medication does not increase the risk of cancerous or noncancerous breast tumors. The medication may lead to gynecomastia (increased breast size), but not breast cancer, which is why we do not use this medication in men.    This medication can increase potassium levels, so please avoid eating excessive amounts of foods which have high potassium levels, such as bananas, orange juice, avocados, cooked spinach, and sweet potatoes. We may or may not monitor your potassium level while adjusting the dose of the medication. Typically, we monitor potassium in people over 40 years old, those with restrictive  diets (i.e. vegan), people with a history of kidney disease, people with a history of cardiac disease, those on  blood pressure medications (such as ACE inhibitors, angiotensin receptor blockers, aldosterone blockers), NSAIDs (such as ibuprofen, naproxen, indomethacin), and potassium supplements.    If you are on a selective serotonin reuptake inhibitor (SSRI; such as citalopram, escitalopram, fluoxetine, paroxetine, sertraline, etc.), we will need to monitor your sodium levels.       RETINOIDS   Your Doctor has prescribed a topical retinoid for your skin.  A retinoid is a vitamin A-derived product used to treat a variety of skin conditions including acne, actinic keratoses (pre-skin cancers), uneven pigmentation from sun damage, fine lines and wrinkles, and enlarged pores.      How do they work?   Retinoids increase skin cell turn over from the normal 30 days to five or six days, minimizing clogged pores--the major factor in acne.  Retinoids can also repair the DNA in cells damaged by the sun, helping to even out skin pigmentation and clear pre-skin cancers.  They stimulate collagen remodeling and repair, reversing signs of maturing skin.   They can shrink oil glands and minimize the appearance of large pores. These effects can not be appreciated unless the medication is used on a consistent basis!    How do I use a retinoid?   After washing with a mild cleanser (CeraVe, Cetaphil, Neutrogena, Purpose), the skin should be moisturized with a non-retinol containing moisturizer such as Cerave cream or PM lotion. Then, a thin layer of medication is applied to the forehead, nose, cheeks, and chin (and around eyes if treating fine wrinkles) at night.  The amount of medication needed to cover the entire face should be no more than the size of a green pea. Irritation around the eyes can be treated with Vaseline at night.     What if my skin appears dry, red, and is peeling?   Retinoids do not cause dry skin but rather they  cause the top layer of the skin to shed, giving an appearance of dry skin.  In fact, new healthy skin cells are replacing the older, damaged cells on the surface. This usually occurs the first 2-4 weeks as the skin is adjusting to the medication.  It is reasonable to use the medication every other night or even every three nights until your skin adjusts.  You can use a MILD exfoliant to remove the peeling skin (Aveeno daily clarifying pads, Aqua glycolic face cream) and can apply a moisturizer throughout the day as needed. Retinoids come in a variety of strengths and vehicles, and your doctor can find one best for you.  If you cannot tolerate prescription strength retinoids, over the counter products with retinol may be beneficial (Olay ProX wrinkle cream, CELESTE deep wrinkle cream, Green Cream at [employer])    Will my skin be more sensitive in the sun?   You will need to use a sunscreen with SPF 30 daily.  Retinoids will cause the outermost layer of the skin to be thinner and thus more sensitive to ultraviolet rays.  However, remember that over time, retinoids actually make the skin thicker by enhancing collagen deposition which protects the skin from sun damage.     When will I see results?   If you are using a retinoid for acne, you should see some improvement in 6-8 weeks.  Do not be alarmed if you find that your acne gets WORSE before it gets better- KEEP USING THE MEDICATION- this is a normal response and your acne will improve if you can stick with it.     If you are using the medication for anti-aging and skin dyspigmentation, you may see results in 3 months, but most effects are not visible until 6 months.  Retinoids are clinically proven to reverse signs of aging, but only if used on a CONSISTENT BASIS!   *Remember that retinoids should not be used if you are pregnant.  *Discontinue use 1 week prior to waxing, as skin is more likely to tear.

## 2022-10-20 NOTE — PROGRESS NOTES
The patient location is: home  The chief complaint leading to consultation is: acne  Visit type: virtual visit with synchronous audio and video  Total time spent with patient: 11 minutes  Each patient to whom I provide medical services by telemedicine is:  (1) informed of the relationship between the physician and patient and the respective role of any other health care provider with respect to management of the patient; and (2) notified that he or she may decline to receive medical services by telemedicine and may withdraw from such care at any time.    Patient Information  Name: Almas Colon  : 2000  MRN: [id number]     Referring Physician:  Jeovany   Primary Care Physician:  PADILLA Day (Inactive)   Date of Visit: 10/20/2022      Subjective:     History of Present lllness:    Almas Colon is a 22 y.o. female who presents with a chief complaint of acne.  Location: back, chest, face  Duration: years, flared x 1-2 months  Signs/Symptoms: inflamed, acne leaves red marks. Always present, but gets better and worse.  Relieving factors/Prior treatments: no relief from antibiotics; doxycycline helped with her back, but then started breaking out on jawline and chest    Hand eczema that flares in winter. Previously improved with steroid cream.    Clinical documentation obtained by nursing staff reviewed.    Review of Systems   Skin:  Positive for itching, rash and dry skin (on hands).     Objective:   Physical Exam   Constitutional: She appears well-developed and well-nourished. No distress.   Neurological: She is alert and oriented to person, place, and time. She is not disoriented.   Psychiatric: She has a normal mood and affect.                [] Data reviewed  [] Prior external notes reviewed  [] Independent review of test  [] Management discussed with another provider  [] Independent historian    Assessment / Plan:        Acne vulgaris  - chronic problem with exacerbation/progression  -      Ambulatory referral/consult to Dermatology  -     tretinoin (RETIN-A) 0.05 % cream; Apply a pea-sized amount to entire face/chest/back qhs.  Dispense: 45 g; Refill: 5  Counseled pt that the retinoid may make the skin dry, red, and irritated. May moisturize daily with a non-comedogenic moisturizer. If still dry, would recommend using the retinoid every other night or less frequently as tolerated. Avoid using around corners of eyes, nose, and mouth.  Patient instructed in importance of daily broad-spectrum sunscreen use with SPF of at least 30. Sun avoidance and topical protection/protective clothing discussed.  Written instructions were provided.    -     spironolactone (ALDACTONE) 100 MG tablet; Take 1 tablet (100 mg total) by mouth once daily.  Dispense: 90 tablet; Refill: 3  Discussed benefits and risks of spironolactone including but not limited to breakthrough bleeding, breast tenderness, and elevated potassium levels which may give symptoms of fatigue, palpitations, and nausea. If patient's blood pressure runs low, this med could give symptoms of low blood pressure including lightheadedness or dizziness. Patient should limit potassium intake - avoid potassium supplements or salt substitutes and limit bananas, coconut water, and citrus fruits. Pregnancy must be avoided while taking spironolactone. If high dose or prolonged use of NSAIDS are needed, be sure to stay well hydrated.      Hand eczema  - chronic problem, not at treatment goal  - Discussed the importance of frequent hand moisturization every hour and after every hand washing. Wash hands with Dove Sensitive Skin Beauty Bar or other fragrance-free cleanser and moisturize with OTC CeraVe healing ointment or Neutrogena Norwegian Formula Hand Cream.   - Minimize hand washing unless visibly dirty, and use an alcohol-based hand  that contains moisturizers and lacks common allergens, such as Hello [place], SupplyAID, SanitizeRx, or Hydra Angie.  -  Can apply the prescribed topical steroid followed by white cotton gloves or a warm, damp towel for severe flares.   -     triamcinolone acetonide 0.1% (KENALOG) 0.1 % cream; Apply to affected areas of body BID prn rash. Do not use on face, underarms, or groin.  Dispense: 80 g; Refill: 1  Side effects from the overuse of topical steroids include thinning of skin, easy tearing/bruising of skin, stretch marks, spider veins, etc. Use the topical steroid no more than 2 days per week if used long-term and/or take breaks from the topical steroid, especially if any of the above side effects are noticed.      Follow up in about 4 months (around 2/20/2023).      Allyson Mortensen MD, FAAD  [facility]

## 2022-12-01 ENCOUNTER — TELEPHONE (OUTPATIENT)
Dept: OPTOMETRY | Facility: CLINIC | Age: 22
End: 2022-12-01
Payer: COMMERCIAL

## 2022-12-09 ENCOUNTER — PATIENT MESSAGE (OUTPATIENT)
Dept: OPTOMETRY | Facility: CLINIC | Age: 22
End: 2022-12-09
Payer: COMMERCIAL

## 2022-12-16 ENCOUNTER — OFFICE VISIT (OUTPATIENT)
Dept: PSYCHIATRY | Facility: CLINIC | Age: 22
End: 2022-12-16
Payer: COMMERCIAL

## 2022-12-16 DIAGNOSIS — F41.1 GAD (GENERALIZED ANXIETY DISORDER): Primary | ICD-10-CM

## 2022-12-16 DIAGNOSIS — F90.2 ATTENTION DEFICIT HYPERACTIVITY DISORDER (ADHD), COMBINED TYPE: ICD-10-CM

## 2022-12-16 PROCEDURE — 99214 PR OFFICE/OUTPT VISIT, EST, LEVL IV, 30-39 MIN: ICD-10-PCS | Mod: 95,,, | Performed by: PHYSICIAN ASSISTANT

## 2022-12-16 PROCEDURE — 1159F MED LIST DOCD IN RCRD: CPT | Mod: CPTII,95,, | Performed by: PHYSICIAN ASSISTANT

## 2022-12-16 PROCEDURE — 99214 OFFICE O/P EST MOD 30 MIN: CPT | Mod: 95,,, | Performed by: PHYSICIAN ASSISTANT

## 2022-12-16 PROCEDURE — 1159F PR MEDICATION LIST DOCUMENTED IN MEDICAL RECORD: ICD-10-PCS | Mod: CPTII,95,, | Performed by: PHYSICIAN ASSISTANT

## 2022-12-16 PROCEDURE — 1160F RVW MEDS BY RX/DR IN RCRD: CPT | Mod: CPTII,95,, | Performed by: PHYSICIAN ASSISTANT

## 2022-12-16 PROCEDURE — 1160F PR REVIEW ALL MEDS BY PRESCRIBER/CLIN PHARMACIST DOCUMENTED: ICD-10-PCS | Mod: CPTII,95,, | Performed by: PHYSICIAN ASSISTANT

## 2022-12-16 RX ORDER — BUSPIRONE HYDROCHLORIDE 5 MG/1
5 TABLET ORAL 2 TIMES DAILY
Qty: 60 TABLET | Refills: 0 | Status: SHIPPED | OUTPATIENT
Start: 2022-12-16 | End: 2023-01-30

## 2022-12-16 NOTE — PROGRESS NOTES
The patient location is: at home in [place], [place]  The chief complaint leading to consultation is: adhd    Visit type: audiovisual    Face to Face time with patient: 15  30 minutes of total time spent on the encounter, which includes face to face time and non-face to face time preparing to see the patient (eg, review of tests), Obtaining and/or reviewing separately obtained history, Documenting clinical information in the electronic or other health record, Independently interpreting results (not separately reported) and communicating results to the patient/family/caregiver, or Care coordination (not separately reported).     Each patient to whom he or she provides medical services by telemedicine is:  (1) informed of the relationship between the physician and patient and the respective role of any other health care provider with respect to management of the patient; and (2) notified that he or she may decline to receive medical services by telemedicine and may withdraw from such care at any time.    Outpatient Psychiatry Follow-Up Visit (MD/NP)    12/16/2022    Clinical Status of Patient:  Outpatient (Ambulatory)    Chief Complaint:  Almas Chowdhury Isaiah is a 22 y.o. female who presents today for follow-up of depression, anxiety and attention problems.  Met with patient.     Interval History and Content of Current Session:  Interim Events/Subjective Report/Content of Current Session:  Almas is seen today for medication follow-up.  Patient reports that she is been good, has been busy with work.  Enjoying her new position although she does feel a little overwhelmed.  In her free time, she is been talking to her ex-boyfriend and they have actually been spending more time together.  They are taking it slow.  They were best friends for years prior to dating.  He works and goes to the gym primarily.  She states she is not been going to the gym as much due to working 4 10 hour shifts.  She is wondering about  something for anxiety support and she is not trialed buspirone in the past.  Sleep and appetite are adequate.  She denies suicidal or homicidal ideation.  No other complaints today.    FROM PREVIOUS HPI  Almas is seen today for medication follow-up. Reports she is doing great. 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 In her free time, she goes to the gym. Reports positive results with Vyvanse. She is no longer taking fluoxetine - did not find benefit. Sleep and appetite are adequate. Mood overall is good. She is no longer dating but remains good friends with her ex-boyfriend. Reports some bloating after she works out, perhaps due to increased protein in diet. Agreed to monitor. No SI/HI. No other complaints.      GAD7 12/16/2022 9/16/2022 6/22/2022   1. Feeling nervous, anxious, or on edge? 0 0 1   2. Not being able to stop or control worrying? 0 0 1   3. Worrying too much about different things? 0 0 0   4. Trouble relaxing? 0 0 0   5. Being so restless that it is hard to sit still? 0 0 0   6. Becoming easily annoyed or irritable? 1 1 0   7. Feeling afraid as if something awful might happen? 0 0 0   8. If you checked off any problems, how difficult have these problems made it for you to do your work, take care of things at home, or get along with other people? 1 0 1   MATHEW-7 Score 1 1 2     PHQ9 9/16/2022   Little interest or pleasure in doing things: Not at all   Feeling down, depressed or hopeless: Not at all   Trouble falling asleep, staying asleep, or sleeping too much: Not at all   Feeling tired or having little energy: Not at all   Poor appetite or overeating: Not at all   Feeling bad about yourself- or that you are a failure or have let yourself or family down Not at all   Trouble concentrating on things, such as reading the newspaper or watching television: Not at all   Moving or speaking so slowly that other people could have noticed. Or the opposite- being so fidgety or restless  that you have been moving around a lot more than usual: Not at all   Thoughts that you would be better off dead or hurting yourself in some way: Not at all   If you indicated you have experienced any of the aforementioned problems, how difficult have these problems made it for you to do your work, take care of things at home or get along with other people? Not difficult at all   Total Score 0         Review of Systems   PSYCHIATRIC: Pertinant items are noted in the narrative.  RESPIRATORY: No shortness of breath.  CARDIOVASCULAR: No tachycardia or chest pain.  GASTROINTESTINAL: No nausea, vomiting, pain, constipation or diarrhea.    Past Medical, Family and Social History: The patient's past medical, family and social history have been reviewed and updated as appropriate within the electronic medical record - see encounter notes.    Compliance: yes    Side effects: none    Risk Parameters:  Patient reports no suicidal ideation  Patient reports no homicidal ideation  Patient reports no self-injurious behavior  Patient reports no violent behavior    Exam (detailed: at least 9 elements; comprehensive: all 15 elements)   Constitutional  Vitals:  Most recent vital signs, dated less than 90 days prior to this appointment, were reviewed.   There were no vitals filed for this visit.     General:  unremarkable, age appropriate, casually dressed     Musculoskeletal  Muscle Strength/Tone:  no dyskinesia   Gait & Station:  Virtual visit     Psychiatric  Speech:  no latency; no press   Mood & Affect:  good  congruent and appropriate   Thought Process:  normal and logical   Associations:  intact   Thought Content:  normal, no suicidality, no homicidality, delusions, or paranoia   Insight:  intact   Judgement: behavior is adequate to circumstances   Orientation:  grossly intact   Memory: intact for content of interview   Language: grossly intact   Attention Span & Concentration:  able to focus   Fund of Knowledge:  intact and  appropriate to age and level of education     Assessment and Diagnosis   Status/Progress: Based on the examination today, the patient's problem(s) is/are improved.  New problems have not been presented today.   Co-morbidities, Diagnostic uncertainty and Lack of compliance are not complicating management of the primary condition.     General Impression:   Adjustment disorder  ADHD, combined presentation type    Intervention/Counseling/Treatment Plan   Medication Management: Continue current medications. The risks and benefits of medication were discussed with the patient.  Counseling provided with patient as follows: importance of compliance with chosen treatment options was emphasized, risks and benefits of treatment options, including medications, were discussed with the patient, risk factor reduction, prognosis    Almas is seen today for medication follow up.     Continue Vyvanse 30 mg daily for inattention/concentration deficits.  Trial buspar 5mg BID for anxiety.     Please go to emergency department if feeling as though you are a harm to yourself or others or if you are in crisis.     Please call the clinic to report any worsening of symptoms or problems associated with medication.    Cardiovascular events: [US Boxed Warning]: Use has been associated with serious cardiovascular events including sudden death in patients with preexisting structural cardiac abnormalities or other serious heart problems (sudden death in children and adolescents; sudden death, stroke, and MI in adults). Consistent with other studies, a large retrospective cohort study involving 1,[phone] children, adolescents, and young adults (aged 2 to 24 years) prescribed methylphenidate, dexmethylphenidate, dextroamphetamines, amphetamine salts, pemoline, or atomoxetine found no evidence that current use of an ADHD medication increased risk for sudden cardiac death, acute MI, or stroke (Sin 2011). Stimulants should be avoided in patients  with known structural cardiac abnormalities, cardiomyopathy, serious heart rhythm abnormalities, coronary artery disease, Marfan syndrome, or other serious cardiac problems. Some products are contraindicated in patients with moderate or severe hypertension. Prior to initiating stimulant, assess medical history and family history of sudden death or ventricular arrhythmia; conduct a physical exam to assess for cardiac disease; patients should receive further evaluation if findings suggest cardiac disease, such as ECG and echocardiogram. Promptly conduct cardiac evaluation in patients who develop exertional chest pain, unexplained syncope, or any other symptoms of cardiac disease during stimulant treatment.      Return to Clinic:3 months, as needed.

## 2023-01-06 ENCOUNTER — OFFICE VISIT (OUTPATIENT)
Dept: OPTOMETRY | Facility: CLINIC | Age: 23
End: 2023-01-06
Payer: COMMERCIAL

## 2023-01-06 DIAGNOSIS — H52.13 MYOPIA OF BOTH EYES: Primary | ICD-10-CM

## 2023-01-06 DIAGNOSIS — Z46.0 FITTING AND ADJUSTMENT OF SPECTACLES AND CONTACT LENSES: Primary | ICD-10-CM

## 2023-01-06 DIAGNOSIS — Z97.3 WEARS CONTACT LENSES: ICD-10-CM

## 2023-01-06 PROCEDURE — 99999 PR PBB SHADOW E&M-EST. PATIENT-LVL III: ICD-10-PCS | Mod: PBBFAC,,, | Performed by: OPTOMETRIST

## 2023-01-06 PROCEDURE — 92310 PR CONTACT LENS FITTING (NO CHANGE): ICD-10-PCS | Mod: S$GLB,,, | Performed by: OPTOMETRIST

## 2023-01-06 PROCEDURE — 92310 CONTACT LENS FITTING OU: CPT | Mod: S$GLB,,, | Performed by: OPTOMETRIST

## 2023-01-06 PROCEDURE — 92012 INTRM OPH EXAM EST PATIENT: CPT | Mod: S$GLB,,, | Performed by: OPTOMETRIST

## 2023-01-06 PROCEDURE — 92012 PR EYE EXAM, EST PATIENT,INTERMED: ICD-10-PCS | Mod: S$GLB,,, | Performed by: OPTOMETRIST

## 2023-01-06 PROCEDURE — 99999 PR PBB SHADOW E&M-EST. PATIENT-LVL III: CPT | Mod: PBBFAC,,, | Performed by: OPTOMETRIST

## 2023-01-06 NOTE — PROGRESS NOTES
HPI    Dle- 09/24/2021    Pt here for routine eye exam and contact fitting. Pt sts changes to   distance va. Floaters, occasional, no increase, nothing new. Denies   flashes/eye pain. Gtts: otc systane prn. Pt likes current brand of   contacts.  Last edited by Sandee Yates MA on 1/6/2023  1:51 PM.            Assessment /Plan     For exam results, see Encounter Report.    Myopia of both eyes    Wears contact lenses      Spectacle Rx given and Contact lens Rx released to pt. Daily wear only advised, with education to risks of extended wear.  Discussed lens care, compliance and solutions. RTC yearly contact lens follow up.  , discussed different options for glasses. RTC 1 year routine eye exam.     Addend 2/21/24 extended clrx until appt

## 2023-01-11 ENCOUNTER — OFFICE VISIT (OUTPATIENT)
Dept: URGENT CARE | Facility: CLINIC | Age: 23
End: 2023-01-11
Payer: COMMERCIAL

## 2023-01-11 ENCOUNTER — PATIENT MESSAGE (OUTPATIENT)
Dept: OPTOMETRY | Facility: CLINIC | Age: 23
End: 2023-01-11
Payer: COMMERCIAL

## 2023-01-11 VITALS
WEIGHT: 177 LBS | BODY MASS INDEX: 30.22 KG/M2 | TEMPERATURE: 98 F | OXYGEN SATURATION: 100 % | RESPIRATION RATE: 18 BRPM | SYSTOLIC BLOOD PRESSURE: 124 MMHG | HEIGHT: 64 IN | DIASTOLIC BLOOD PRESSURE: 84 MMHG | HEART RATE: 90 BPM

## 2023-01-11 DIAGNOSIS — L03.317 CELLULITIS AND ABSCESS OF BUTTOCK: Primary | ICD-10-CM

## 2023-01-11 DIAGNOSIS — L02.31 CELLULITIS AND ABSCESS OF BUTTOCK: Primary | ICD-10-CM

## 2023-01-11 DIAGNOSIS — F90.2 ATTENTION DEFICIT HYPERACTIVITY DISORDER (ADHD), COMBINED TYPE: ICD-10-CM

## 2023-01-11 PROCEDURE — 99213 OFFICE O/P EST LOW 20 MIN: CPT | Mod: S$GLB,,, | Performed by: NURSE PRACTITIONER

## 2023-01-11 PROCEDURE — 1159F MED LIST DOCD IN RCRD: CPT | Mod: CPTII,S$GLB,, | Performed by: NURSE PRACTITIONER

## 2023-01-11 PROCEDURE — 3074F SYST BP LT 130 MM HG: CPT | Mod: CPTII,S$GLB,, | Performed by: NURSE PRACTITIONER

## 2023-01-11 PROCEDURE — 1159F PR MEDICATION LIST DOCUMENTED IN MEDICAL RECORD: ICD-10-PCS | Mod: CPTII,S$GLB,, | Performed by: NURSE PRACTITIONER

## 2023-01-11 PROCEDURE — 3008F PR BODY MASS INDEX (BMI) DOCUMENTED: ICD-10-PCS | Mod: CPTII,S$GLB,, | Performed by: NURSE PRACTITIONER

## 2023-01-11 PROCEDURE — 99213 PR OFFICE/OUTPT VISIT, EST, LEVL III, 20-29 MIN: ICD-10-PCS | Mod: S$GLB,,, | Performed by: NURSE PRACTITIONER

## 2023-01-11 PROCEDURE — 3008F BODY MASS INDEX DOCD: CPT | Mod: CPTII,S$GLB,, | Performed by: NURSE PRACTITIONER

## 2023-01-11 PROCEDURE — 1160F RVW MEDS BY RX/DR IN RCRD: CPT | Mod: CPTII,S$GLB,, | Performed by: NURSE PRACTITIONER

## 2023-01-11 PROCEDURE — 3079F DIAST BP 80-89 MM HG: CPT | Mod: CPTII,S$GLB,, | Performed by: NURSE PRACTITIONER

## 2023-01-11 PROCEDURE — 3074F PR MOST RECENT SYSTOLIC BLOOD PRESSURE < 130 MM HG: ICD-10-PCS | Mod: CPTII,S$GLB,, | Performed by: NURSE PRACTITIONER

## 2023-01-11 PROCEDURE — 1160F PR REVIEW ALL MEDS BY PRESCRIBER/CLIN PHARMACIST DOCUMENTED: ICD-10-PCS | Mod: CPTII,S$GLB,, | Performed by: NURSE PRACTITIONER

## 2023-01-11 PROCEDURE — 3079F PR MOST RECENT DIASTOLIC BLOOD PRESSURE 80-89 MM HG: ICD-10-PCS | Mod: CPTII,S$GLB,, | Performed by: NURSE PRACTITIONER

## 2023-01-11 RX ORDER — DOXYCYCLINE 100 MG/1
100 CAPSULE ORAL 2 TIMES DAILY
Qty: 14 CAPSULE | Refills: 0 | Status: SHIPPED | OUTPATIENT
Start: 2023-01-11 | End: 2023-01-18

## 2023-01-11 RX ORDER — MUPIROCIN 20 MG/G
OINTMENT TOPICAL 3 TIMES DAILY
Qty: 1 EACH | Refills: 0 | Status: SHIPPED | OUTPATIENT
Start: 2023-01-11 | End: 2023-01-16

## 2023-01-11 RX ORDER — LISDEXAMFETAMINE DIMESYLATE 30 MG/1
30 CAPSULE ORAL EVERY MORNING
Qty: 30 CAPSULE | Refills: 0 | Status: SHIPPED | OUTPATIENT
Start: 2023-01-11 | End: 2023-02-17 | Stop reason: SDUPTHER

## 2023-01-12 NOTE — PROGRESS NOTES
"Subjective:       Patient ID: Almas Colon is a 22 y.o. female.    Vitals:  height is 5' 4" (1.626 m) and weight is 80.3 kg (177 lb). Her oral temperature is 98.3 °F (36.8 °C). Her blood pressure is 124/84 and her pulse is 90. Her respiration is 18 and oxygen saturation is 100%.     Chief Complaint: Insect Bite    Patient was bitten by something 1/8/23, she noticed them 1/9/23. There were 4 bites, 3 went away, and the one on her buttocks has gotten infected. It is appears to be approximately 1 cm in length.    Provider note begins below:  Patient denies fever or chills. No diabetes Hx.  Denies history of abscess.  Patient denies drainage from area.  No fever.    Insect Bite  This is a new problem. The current episode started in the past 7 days. The problem occurs constantly. The problem has been gradually worsening. Pertinent negatives include no arthralgias, chest pain, chills, congestion, coughing, diaphoresis, fatigue, nausea, rash, sore throat or vomiting. Nothing aggravates the symptoms. She has tried nothing for the symptoms.     Constitution: Negative. Negative for chills, sweating and fatigue.   HENT: Negative.  Negative for ear pain, facial swelling, congestion and sore throat.    Neck: Negative for painful lymph nodes.   Cardiovascular: Negative.  Negative for chest trauma, chest pain and sob on exertion.   Eyes: Negative.  Negative for eye itching and eye pain.   Respiratory: Negative.  Negative for chest tightness, cough and asthma.    Gastrointestinal: Negative.  Negative for nausea, vomiting and diarrhea.   Endocrine: negative. cold intolerance and excessive thirst.   Genitourinary: Negative.  Negative for dysuria, frequency, urgency and hematuria.   Musculoskeletal:  Negative for pain, trauma and joint pain.   Skin: Negative.  Positive for abscess. Negative for rash, wound, erythema and hives.   Allergic/Immunologic: Negative.  Negative for eczema, asthma, hives and itching. "   Neurological: Negative.  Negative for disorientation and altered mental status.   Hematologic/Lymphatic: Negative.  Negative for swollen lymph nodes.   Psychiatric/Behavioral: Negative.  Negative for altered mental status, disorientation and confusion.      Objective:      Physical Exam   Constitutional: She is oriented to person, place, and time. She appears well-developed.  Non-toxic appearance. She does not appear ill. No distress.   HENT:   Head: Normocephalic and atraumatic. Head is without abrasion, without contusion and without laceration.   Ears:   Right Ear: External ear normal.   Left Ear: External ear normal.   Nose: Nose normal.   Mouth/Throat: Oropharynx is clear and moist and mucous membranes are normal.   Eyes: Conjunctivae, EOM and lids are normal. Pupils are equal, round, and reactive to light.   Neck: Trachea normal and phonation normal. Neck supple.   Cardiovascular: Normal rate, regular rhythm and normal heart sounds.   Pulmonary/Chest: Effort normal and breath sounds normal. No stridor. No respiratory distress.   Musculoskeletal: Normal range of motion.         General: Normal range of motion.   Lymphadenopathy:     She has no cervical adenopathy.   Neurological: She is alert and oriented to person, place, and time.   Skin: Skin is warm, dry, intact, not diaphoretic and no rash. Capillary refill takes less than 2 seconds. No abrasion, No burn, No bruising, No erythema and No ecchymosis         Comments: Small 1 cm abscess to left buttocks with induration, warmth, and erythema and no fluctuation.  Small amount of clear to yellow drainage.   Psychiatric: Her speech is normal and behavior is normal. Judgment and thought content normal.   Nursing note and vitals reviewed.      Assessment:       1. Cellulitis and abscess of buttock          Plan:       FOLLOWUP  Follow up if symptoms worsen or fail to improve, for PLEASE CONTACT PCP OR CONTACT THE EMERGENCY ROOM..     PATIENT INSTRUCTIONS  Patient  Instructions   INSTRUCTIONS:  - Rest.  - Drink plenty of fluids.  - Take Tylenol and/or Ibuprofen as directed as needed for fever/pain.  Do not take more than the recommended dose.  - follow up with your PCP within the next 1-2 weeks as needed.  - You must understand that you have received an Urgent Care treatment only and that you may be released before all of your medical problems are known or treated.   - You, the patient, will arrange for follow up care as instructed.   - If your condition worsens or fails to improve we recommend that you receive another evaluation at the ER immediately or contact your PCP to discuss your concerns.   - You can call [phone] or [phone] to help schedule an appointment with the appropriate provider.     -If you smoke cigarettes, it would be beneficial for you to stop.         THANK YOU FOR ALLOWING ME TO PARTICIPATE IN YOUR HEALTHCARE,     Matthew Vazquez NP   Cellulitis and abscess of buttock  -     mupirocin (BACTROBAN) 2 % ointment; Apply topically 3 (three) times daily. for 5 days  Dispense: 1 each; Refill: 0  -     doxycycline (VIBRAMYCIN) 100 MG Cap; Take 1 capsule (100 mg total) by mouth 2 (two) times daily. for 7 days  Dispense: 14 capsule; Refill: 0

## 2023-01-12 NOTE — PATIENT INSTRUCTIONS

## 2023-02-17 DIAGNOSIS — F90.2 ATTENTION DEFICIT HYPERACTIVITY DISORDER (ADHD), COMBINED TYPE: ICD-10-CM

## 2023-02-20 RX ORDER — LISDEXAMFETAMINE DIMESYLATE 30 MG/1
30 CAPSULE ORAL EVERY MORNING
Qty: 30 CAPSULE | Refills: 0 | Status: SHIPPED | OUTPATIENT
Start: 2023-02-20 | End: 2023-03-31 | Stop reason: SDUPTHER

## 2023-02-20 NOTE — TELEPHONE ENCOUNTER
Pt is requesting medication refill on lisdexamfetamine (VYVANSE) 30 MG capsule  Last refill: 01/11/2023  Last visit: 12/16/2023  Follow Up: 03/27/2023

## 2023-03-10 ENCOUNTER — OFFICE VISIT (OUTPATIENT)
Dept: OBSTETRICS AND GYNECOLOGY | Facility: CLINIC | Age: 23
End: 2023-03-10
Payer: COMMERCIAL

## 2023-03-10 VITALS
BODY MASS INDEX: 30.01 KG/M2 | WEIGHT: 174.81 LBS | SYSTOLIC BLOOD PRESSURE: 122 MMHG | DIASTOLIC BLOOD PRESSURE: 86 MMHG

## 2023-03-10 DIAGNOSIS — Z30.9 ENCOUNTER FOR CONTRACEPTIVE MANAGEMENT, UNSPECIFIED TYPE: ICD-10-CM

## 2023-03-10 DIAGNOSIS — Z01.419 WELL WOMAN EXAM: Primary | ICD-10-CM

## 2023-03-10 PROCEDURE — 1159F MED LIST DOCD IN RCRD: CPT | Mod: CPTII,S$GLB,, | Performed by: STUDENT IN AN ORGANIZED HEALTH CARE EDUCATION/TRAINING PROGRAM

## 2023-03-10 PROCEDURE — 3074F PR MOST RECENT SYSTOLIC BLOOD PRESSURE < 130 MM HG: ICD-10-PCS | Mod: CPTII,S$GLB,, | Performed by: STUDENT IN AN ORGANIZED HEALTH CARE EDUCATION/TRAINING PROGRAM

## 2023-03-10 PROCEDURE — 99395 PREV VISIT EST AGE 18-39: CPT | Mod: S$GLB,,, | Performed by: STUDENT IN AN ORGANIZED HEALTH CARE EDUCATION/TRAINING PROGRAM

## 2023-03-10 PROCEDURE — 1159F PR MEDICATION LIST DOCUMENTED IN MEDICAL RECORD: ICD-10-PCS | Mod: CPTII,S$GLB,, | Performed by: STUDENT IN AN ORGANIZED HEALTH CARE EDUCATION/TRAINING PROGRAM

## 2023-03-10 PROCEDURE — 3008F PR BODY MASS INDEX (BMI) DOCUMENTED: ICD-10-PCS | Mod: CPTII,S$GLB,, | Performed by: STUDENT IN AN ORGANIZED HEALTH CARE EDUCATION/TRAINING PROGRAM

## 2023-03-10 PROCEDURE — 3074F SYST BP LT 130 MM HG: CPT | Mod: CPTII,S$GLB,, | Performed by: STUDENT IN AN ORGANIZED HEALTH CARE EDUCATION/TRAINING PROGRAM

## 2023-03-10 PROCEDURE — 3079F PR MOST RECENT DIASTOLIC BLOOD PRESSURE 80-89 MM HG: ICD-10-PCS | Mod: CPTII,S$GLB,, | Performed by: STUDENT IN AN ORGANIZED HEALTH CARE EDUCATION/TRAINING PROGRAM

## 2023-03-10 PROCEDURE — 3079F DIAST BP 80-89 MM HG: CPT | Mod: CPTII,S$GLB,, | Performed by: STUDENT IN AN ORGANIZED HEALTH CARE EDUCATION/TRAINING PROGRAM

## 2023-03-10 PROCEDURE — 99999 PR PBB SHADOW E&M-EST. PATIENT-LVL IV: ICD-10-PCS | Mod: PBBFAC,,, | Performed by: STUDENT IN AN ORGANIZED HEALTH CARE EDUCATION/TRAINING PROGRAM

## 2023-03-10 PROCEDURE — 99999 PR PBB SHADOW E&M-EST. PATIENT-LVL IV: CPT | Mod: PBBFAC,,, | Performed by: STUDENT IN AN ORGANIZED HEALTH CARE EDUCATION/TRAINING PROGRAM

## 2023-03-10 PROCEDURE — 88175 CYTOPATH C/V AUTO FLUID REDO: CPT | Performed by: STUDENT IN AN ORGANIZED HEALTH CARE EDUCATION/TRAINING PROGRAM

## 2023-03-10 PROCEDURE — 99395 PR PREVENTIVE VISIT,EST,18-39: ICD-10-PCS | Mod: S$GLB,,, | Performed by: STUDENT IN AN ORGANIZED HEALTH CARE EDUCATION/TRAINING PROGRAM

## 2023-03-10 PROCEDURE — 3008F BODY MASS INDEX DOCD: CPT | Mod: CPTII,S$GLB,, | Performed by: STUDENT IN AN ORGANIZED HEALTH CARE EDUCATION/TRAINING PROGRAM

## 2023-03-10 NOTE — PROGRESS NOTES
History & Physical  Gynecology      SUBJECTIVE:     Chief Complaint: Establish Care and Annual Exam       History of Present Illness:    Here for annual exam. No issues.     Gyn: regular cycles, not currently sexually active, thinking about contraception, unsure. No immediate FH of gyn or colon cancer    No tobacco         Review of patient's allergies indicates:   Allergen Reactions    Cefzil [cefprozil] Hives    Rocephin [ceftriaxone] Hives       Past Medical History:   Diagnosis Date    Acne     Anxiety     Asthma     Depression     Seasonal allergic rhinitis 05/30/2018     Past Surgical History:   Procedure Laterality Date    TONSILLECTOMY       OB History    No obstetric history on file.       Family History   Problem Relation Age of Onset    Glaucoma Paternal Grandmother     Diabetes Maternal Grandmother     Acne Father     Acne Mother     Cataracts Neg Hx     Macular degeneration Neg Hx     Retinal detachment Neg Hx     Uterine cancer Neg Hx     Breast cancer Neg Hx     Ovarian cancer Neg Hx     Cervical cancer Neg Hx      Social History     Tobacco Use    Smoking status: Never    Smokeless tobacco: Never   Substance Use Topics    Alcohol use: Yes     Comment: Occasionally. Maybe once a month or every few months    Drug use: No       Current Outpatient Medications   Medication Sig    albuterol (PROVENTIL HFA) 90 mcg/actuation inhaler Inhale 2 puffs into the lungs every 6 (six) hours as needed for Wheezing. Rescue    ascorbic acid, vitamin C, (VITAMIN C) 1000 MG tablet Take 1,000 mg by mouth once daily.    b complex vitamins capsule Take 1 capsule by mouth once daily.    busPIRone (BUSPAR) 5 MG Tab TAKE 1 TABLET BY MOUTH TWICE A DAY    ergocalciferol (ERGOCALCIFEROL) 50,000 unit Cap Take 50,000 Units by mouth every 7 days.    FLAXSEED ORAL Take by mouth.    Lactobacillus acidophilus (PROBIOTIC ACIDOPHILUS ORAL) Take by mouth once daily.    lisdexamfetamine (VYVANSE) 30 MG capsule Take 1  capsule (30 mg total) by mouth every morning.    loratadine (CLARITIN) 10 mg tablet Take 1 tablet (10 mg total) by mouth once daily.    spironolactone (ALDACTONE) 100 MG tablet Take 1 tablet (100 mg total) by mouth once daily. (Patient not taking: Reported on 1/6/2023)    tretinoin (RETIN-A) 0.05 % cream Apply a pea-sized amount to entire face/chest/back qhs.    triamcinolone acetonide 0.1% (KENALOG) 0.1 % cream Apply to affected areas of body BID prn rash. Do not use on face, underarms, or groin.    zinc gluconate 50 mg tablet Take 50 mg by mouth once daily.     No current facility-administered medications for this visit.         Review of Systems:  Review of Systems   Constitutional:  Negative for chills, fatigue and fever.   HENT:  Negative for congestion.    Eyes:  Negative for visual disturbance.   Respiratory:  Negative for cough and shortness of breath.    Cardiovascular:  Negative for chest pain and palpitations.   Gastrointestinal:  Negative for abdominal distention, abdominal pain, constipation, diarrhea, nausea and vomiting.   Genitourinary:  Negative for difficulty urinating, dysuria, hematuria, vaginal bleeding and vaginal discharge.   Skin:  Negative for rash.   Neurological:  Negative for dizziness, seizures, light-headedness and headaches.   Hematological:  Does not bruise/bleed easily.   Psychiatric/Behavioral:  Negative for dysphoric mood. The patient is not nervous/anxious.       OBJECTIVE:     Physical Exam:  Physical Exam  Vitals reviewed.   Constitutional:       General: She is not in acute distress.     Appearance: Normal appearance. She is well-developed.   HENT:      Head: Normocephalic and atraumatic.   Cardiovascular:      Rate and Rhythm: Normal rate and regular rhythm.   Pulmonary:      Effort: Pulmonary effort is normal.   Chest:   Breasts:     Right: No inverted nipple, mass, nipple discharge, skin change or tenderness.      Left: No inverted nipple, mass, nipple discharge, skin change  or tenderness.   Abdominal:      General: There is no distension.      Palpations: Abdomen is soft.      Tenderness: There is no abdominal tenderness.   Genitourinary:     Vagina: Normal.      Comments: Normal external female genitalia, normal hair distribution. Vaginal mucosa pink, moist, well rugated, scant white physiologic discharge. No blood in vault. Cervix pink, non-friable, without lesion. No CMT. Uterus non tender, mobile, not enlarged. Adnexa without fullness or tenderness.    Skin:     General: Skin is warm.   Neurological:      Mental Status: She is alert and oriented to person, place, and time.   Psychiatric:         Behavior: Behavior normal.         Thought Content: Thought content normal.         Judgment: Judgment normal.         ASSESSMENT:       ICD-10-CM ICD-9-CM    1. Well woman exam  Z01.419 V72.31       2. Encounter for contraceptive management, unspecified type  Z30.9 V25.9 Ambulatory referral/consult to Obstetrics / Gynecology          No orders of the defined types were placed in this encounter.          Plan:      Well woman:  - Pap today   - tobacco cessation n/a  - contraception declines, thinking about   - HPV vaccine up to date  - Safe Sex discussed   - Counseled to take daily multivitamin. If patient is of reproductive age and not on contraception, to take prenatal vitamin. Patient has been counseled on the vitamin D and calcium requirements per ACOG recommendations.  Age   Calcium(mg/day)   Vitamin D (IU/day)  9-18    1300                       600  19-50  1000                       600  51-70  1200                       600  >70      1,200                      800  Patient to continue daily vitamin   - Covid vaccine n/a  - Discussed IPV, feels safe     Juani Branch M.D.  Obstetrics and Gynecology

## 2023-03-31 ENCOUNTER — OFFICE VISIT (OUTPATIENT)
Dept: PSYCHIATRY | Facility: CLINIC | Age: 23
End: 2023-03-31
Payer: COMMERCIAL

## 2023-03-31 VITALS
WEIGHT: 178.56 LBS | HEART RATE: 104 BPM | SYSTOLIC BLOOD PRESSURE: 137 MMHG | BODY MASS INDEX: 30.48 KG/M2 | HEIGHT: 64 IN | DIASTOLIC BLOOD PRESSURE: 91 MMHG

## 2023-03-31 DIAGNOSIS — F43.23 ADJUSTMENT DISORDER WITH MIXED ANXIETY AND DEPRESSED MOOD: Primary | ICD-10-CM

## 2023-03-31 DIAGNOSIS — F41.1 GAD (GENERALIZED ANXIETY DISORDER): ICD-10-CM

## 2023-03-31 DIAGNOSIS — F90.2 ATTENTION DEFICIT HYPERACTIVITY DISORDER (ADHD), COMBINED TYPE: ICD-10-CM

## 2023-03-31 PROCEDURE — 3080F PR MOST RECENT DIASTOLIC BLOOD PRESSURE >= 90 MM HG: ICD-10-PCS | Mod: CPTII,S$GLB,, | Performed by: PHYSICIAN ASSISTANT

## 2023-03-31 PROCEDURE — 1160F RVW MEDS BY RX/DR IN RCRD: CPT | Mod: CPTII,S$GLB,, | Performed by: PHYSICIAN ASSISTANT

## 2023-03-31 PROCEDURE — 3080F DIAST BP >= 90 MM HG: CPT | Mod: CPTII,S$GLB,, | Performed by: PHYSICIAN ASSISTANT

## 2023-03-31 PROCEDURE — 3008F PR BODY MASS INDEX (BMI) DOCUMENTED: ICD-10-PCS | Mod: CPTII,S$GLB,, | Performed by: PHYSICIAN ASSISTANT

## 2023-03-31 PROCEDURE — 3008F BODY MASS INDEX DOCD: CPT | Mod: CPTII,S$GLB,, | Performed by: PHYSICIAN ASSISTANT

## 2023-03-31 PROCEDURE — 99999 PR PBB SHADOW E&M-EST. PATIENT-LVL III: ICD-10-PCS | Mod: PBBFAC,,, | Performed by: PHYSICIAN ASSISTANT

## 2023-03-31 PROCEDURE — 1159F MED LIST DOCD IN RCRD: CPT | Mod: CPTII,S$GLB,, | Performed by: PHYSICIAN ASSISTANT

## 2023-03-31 PROCEDURE — 99214 PR OFFICE/OUTPT VISIT, EST, LEVL IV, 30-39 MIN: ICD-10-PCS | Mod: S$GLB,,, | Performed by: PHYSICIAN ASSISTANT

## 2023-03-31 PROCEDURE — 99214 OFFICE O/P EST MOD 30 MIN: CPT | Mod: S$GLB,,, | Performed by: PHYSICIAN ASSISTANT

## 2023-03-31 PROCEDURE — 99999 PR PBB SHADOW E&M-EST. PATIENT-LVL III: CPT | Mod: PBBFAC,,, | Performed by: PHYSICIAN ASSISTANT

## 2023-03-31 PROCEDURE — 1160F PR REVIEW ALL MEDS BY PRESCRIBER/CLIN PHARMACIST DOCUMENTED: ICD-10-PCS | Mod: CPTII,S$GLB,, | Performed by: PHYSICIAN ASSISTANT

## 2023-03-31 PROCEDURE — 3075F PR MOST RECENT SYSTOLIC BLOOD PRESS GE 130-139MM HG: ICD-10-PCS | Mod: CPTII,S$GLB,, | Performed by: PHYSICIAN ASSISTANT

## 2023-03-31 PROCEDURE — 1159F PR MEDICATION LIST DOCUMENTED IN MEDICAL RECORD: ICD-10-PCS | Mod: CPTII,S$GLB,, | Performed by: PHYSICIAN ASSISTANT

## 2023-03-31 PROCEDURE — 3075F SYST BP GE 130 - 139MM HG: CPT | Mod: CPTII,S$GLB,, | Performed by: PHYSICIAN ASSISTANT

## 2023-03-31 RX ORDER — LISDEXAMFETAMINE DIMESYLATE 30 MG/1
30 CAPSULE ORAL EVERY MORNING
Qty: 30 CAPSULE | Refills: 0 | Status: SHIPPED | OUTPATIENT
Start: 2023-03-31 | End: 2023-05-11 | Stop reason: SDUPTHER

## 2023-03-31 NOTE — PROGRESS NOTES
Outpatient Psychiatry Follow-Up Visit (MD/NP)    3/31/2023    Clinical Status of Patient:  Outpatient (Ambulatory)    Chief Complaint:  Almas Colon is a 23 y.o. female who presents today for follow-up of depression, anxiety and attention problems.  Met with patient.     Interval History and Content of Current Session:  Interim Events/Subjective Report/Content of Current Session:  Almas is seen today for medication follow-up.  She reports that she is doing okay.  Weight is reviewed and is stable.  She states she loves her job.  She felt that buspirone was not helpful in conjunction with Vyvanse.  She stopped buspirone about 1-2 weeks ago.  She does feel that her anxiety acts up at times.  She feels somewhat OCD like.  She is not tolerated serotonergic medications in the past and does not wish to start one.  We discussed typical treatment OCD type symptoms.  She reports she is looking forward to the weekend as her niece is turning one.  She is still seeing her significant other and the relationship is stable.  Patient denies suicidal or homicidal ideation.  No other complaints today.    FROM PREVIOUS HPI  Almas is seen today for medication follow-up.  Patient reports that she is been good, has been busy with work.  Enjoying her new position although she does feel a little overwhelmed.  In her free time, she is been talking to her ex-boyfriend and they have actually been spending more time together.  They are taking it slow.  They were best friends for years prior to dating.  He works and goes to the gym primarily.  She states she is not been going to the gym as much due to working 4 10 hour shifts.  She is wondering about something for anxiety support and she is not trialed buspirone in the past.  Sleep and appetite are adequate.  She denies suicidal or homicidal ideation.  No other complaints today.      GAD7 3/31/2023 12/16/2022 9/16/2022   1. Feeling nervous, anxious, or on edge? 1 0 0   2. Not  being able to stop or control worrying? 0 0 0   3. Worrying too much about different things? 0 0 0   4. Trouble relaxing? 0 0 0   5. Being so restless that it is hard to sit still? 0 0 0   6. Becoming easily annoyed or irritable? 1 1 1   7. Feeling afraid as if something awful might happen? 0 0 0   8. If you checked off any problems, how difficult have these problems made it for you to do your work, take care of things at home, or get along with other people? 1 1 0   MATHEW-7 Score 2 1 1     PHQ9 9/16/2022   Little interest or pleasure in doing things: Not at all   Feeling down, depressed or hopeless: Not at all   Trouble falling asleep, staying asleep, or sleeping too much: Not at all   Feeling tired or having little energy: Not at all   Poor appetite or overeating: Not at all   Feeling bad about yourself- or that you are a failure or have let yourself or family down Not at all   Trouble concentrating on things, such as reading the newspaper or watching television: Not at all   Moving or speaking so slowly that other people could have noticed. Or the opposite- being so fidgety or restless that you have been moving around a lot more than usual: Not at all   Thoughts that you would be better off dead or hurting yourself in some way: Not at all   If you indicated you have experienced any of the aforementioned problems, how difficult have these problems made it for you to do your work, take care of things at home or get along with other people? Not difficult at all   Total Score 0       Review of Systems   PSYCHIATRIC: Pertinant items are noted in the narrative.  RESPIRATORY: No shortness of breath.  CARDIOVASCULAR: No tachycardia or chest pain.  GASTROINTESTINAL: No nausea, vomiting, pain, constipation or diarrhea.    Past Medical, Family and Social History: The patient's past medical, family and social history have been reviewed and updated as appropriate within the electronic medical record - see encounter  notes.    Compliance: yes    Side effects: none    Risk Parameters:  Patient reports no suicidal ideation  Patient reports no homicidal ideation  Patient reports no self-injurious behavior  Patient reports no violent behavior    Exam (detailed: at least 9 elements; comprehensive: all 15 elements)   Constitutional  Vitals:  Most recent vital signs, dated less than 90 days prior to this appointment, were reviewed.   Vitals:    03/31/23 1120   BP: (!) 137/91   Pulse: 104          General:  unremarkable, age appropriate, casually dressed     Musculoskeletal  Muscle Strength/Tone:  no dyskinesia   Gait & Station:  non-ataxic     Psychiatric  Speech:  no latency; no press   Mood & Affect:  good  congruent and appropriate   Thought Process:  normal and logical   Associations:  intact   Thought Content:  normal, no suicidality, no homicidality, delusions, or paranoia   Insight:  intact   Judgement: behavior is adequate to circumstances   Orientation:  grossly intact   Memory: intact for content of interview   Language: grossly intact   Attention Span & Concentration:  able to focus   Fund of Knowledge:  intact and appropriate to age and level of education     Assessment and Diagnosis   Status/Progress: Based on the examination today, the patient's problem(s) is/are improved.  New problems have not been presented today.   Co-morbidities, Diagnostic uncertainty and Lack of compliance are not complicating management of the primary condition.     General Impression:   Adjustment disorder  ADHD, combined presentation type    Intervention/Counseling/Treatment Plan   Medication Management: Continue current medications. The risks and benefits of medication were discussed with the patient.  Counseling provided with patient as follows: importance of compliance with chosen treatment options was emphasized, risks and benefits of treatment options, including medications, were discussed with the patient, risk factor reduction,  man Coburn is seen today for medication follow up.     Continue Vyvanse 30 mg daily for inattention/concentration deficits.      Please go to emergency department if feeling as though you are a harm to yourself or others or if you are in crisis.     Please call the clinic to report any worsening of symptoms or problems associated with medication.    Cardiovascular events: [US Boxed Warning]: Use has been associated with serious cardiovascular events including sudden death in patients with preexisting structural cardiac abnormalities or other serious heart problems (sudden death in children and adolescents; sudden death, stroke, and MI in adults). Consistent with other studies, a large retrospective cohort study involving 1,[phone] children, adolescents, and young adults (aged 2 to 24 years) prescribed methylphenidate, dexmethylphenidate, dextroamphetamines, amphetamine salts, pemoline, or atomoxetine found no evidence that current use of an ADHD medication increased risk for sudden cardiac death, acute MI, or stroke (Sin 2011). Stimulants should be avoided in patients with known structural cardiac abnormalities, cardiomyopathy, serious heart rhythm abnormalities, coronary artery disease, Marfan syndrome, or other serious cardiac problems. Some products are contraindicated in patients with moderate or severe hypertension. Prior to initiating stimulant, assess medical history and family history of sudden death or ventricular arrhythmia; conduct a physical exam to assess for cardiac disease; patients should receive further evaluation if findings suggest cardiac disease, such as ECG and echocardiogram. Promptly conduct cardiac evaluation in patients who develop exertional chest pain, unexplained syncope, or any other symptoms of cardiac disease during stimulant treatment.      Return to Clinic:3 months, as needed.

## 2023-05-11 DIAGNOSIS — F90.2 ATTENTION DEFICIT HYPERACTIVITY DISORDER (ADHD), COMBINED TYPE: ICD-10-CM

## 2023-05-11 RX ORDER — LISDEXAMFETAMINE DIMESYLATE 30 MG/1
30 CAPSULE ORAL EVERY MORNING
Qty: 30 CAPSULE | Refills: 0 | Status: SHIPPED | OUTPATIENT
Start: 2023-05-11 | End: 2023-06-22 | Stop reason: SDUPTHER

## 2023-05-30 ENCOUNTER — OFFICE VISIT (OUTPATIENT)
Dept: URGENT CARE | Facility: CLINIC | Age: 23
End: 2023-05-30
Payer: COMMERCIAL

## 2023-05-30 VITALS
RESPIRATION RATE: 16 BRPM | TEMPERATURE: 98 F | HEIGHT: 64 IN | SYSTOLIC BLOOD PRESSURE: 118 MMHG | BODY MASS INDEX: 30.39 KG/M2 | OXYGEN SATURATION: 97 % | WEIGHT: 178 LBS | DIASTOLIC BLOOD PRESSURE: 84 MMHG | HEART RATE: 107 BPM

## 2023-05-30 DIAGNOSIS — J02.9 SORE THROAT: Primary | ICD-10-CM

## 2023-05-30 LAB
CTP QC/QA: YES
HETEROPH AB SER QL: NEGATIVE
MOLECULAR STREP A: NEGATIVE
SARS-COV-2 AG RESP QL IA.RAPID: NEGATIVE

## 2023-05-30 PROCEDURE — 86308 HETEROPHILE ANTIBODY SCREEN: CPT | Mod: QW,S$GLB,, | Performed by: PHYSICIAN ASSISTANT

## 2023-05-30 PROCEDURE — 87811 SARS-COV-2 COVID19 W/OPTIC: CPT | Mod: QW,S$GLB,, | Performed by: PHYSICIAN ASSISTANT

## 2023-05-30 PROCEDURE — 87651 STREP A DNA AMP PROBE: CPT | Mod: QW,S$GLB,, | Performed by: PHYSICIAN ASSISTANT

## 2023-05-30 PROCEDURE — 87811 SARS CORONAVIRUS 2 ANTIGEN POCT, MANUAL READ: ICD-10-PCS | Mod: QW,S$GLB,, | Performed by: PHYSICIAN ASSISTANT

## 2023-05-30 PROCEDURE — 86308 POCT INFECTIOUS MONONUCLEOSIS: ICD-10-PCS | Mod: QW,S$GLB,, | Performed by: PHYSICIAN ASSISTANT

## 2023-05-30 PROCEDURE — 99213 OFFICE O/P EST LOW 20 MIN: CPT | Mod: S$GLB,,, | Performed by: PHYSICIAN ASSISTANT

## 2023-05-30 PROCEDURE — 99213 PR OFFICE/OUTPT VISIT, EST, LEVL III, 20-29 MIN: ICD-10-PCS | Mod: S$GLB,,, | Performed by: PHYSICIAN ASSISTANT

## 2023-05-30 PROCEDURE — 87651 POCT STREP A MOLECULAR: ICD-10-PCS | Mod: QW,S$GLB,, | Performed by: PHYSICIAN ASSISTANT

## 2023-05-30 NOTE — PATIENT INSTRUCTIONS
You must understand that you've received an Urgent Care treatment only and that you may be released before all your medical problems are known or treated. You, the patient, will arrange for follow up care as instructed.  Follow up with your PCP or specialty clinic as directed if not improved or as needed. You can call [phone] to schedule an appointment with the appropriate provider.  If your condition worsens we recommend that you receive another evaluation at the Emergency Department for any concerns or worsening of condition.  Patient aware and verbalized understanding.    Reviewed strep and COVID-19 and mono results with patient.  Increase fluids: Cool liquids as much as possible.   Avoid any foods or beverages that may cause irritation to the throat (spicy, acidic, rough, etc.).  Rest is important.  Avoid contact with sick individuals.  Humidifier use at home.  THROW AWAY TOOTHBRUSH AND START WITH NEW ONE AS DISCUSSED.  AVOID SHARING FOOD/DRINK AS DISCUSSED.   Can take OTC Claritin or Zyrtec or Allegra (plain) daily as needed for seasonal allergies/nasal congestion, etc.  Can take OTC Flonase Nasal [place] as needed for nasal congestion/seasonal allergies, etc.  Can take OTC Tylenol or Motrin UNLESS CONTRAINDICATED (liver and/or kidney issues, etc.) every 4 - 6 hours as needed for fever or pain, etc.  Follow-up with your PCP in the next 24-72hrs or sooner for re-evaluation especially if no improvement in symptoms.  ER precautions given to patient.  Patient aware, verbalized understanding and agreed with plan of care.  
none

## 2023-05-30 NOTE — PROGRESS NOTES
"Subjective:      Patient ID: Almas Colon is a 23 y.o. female.    Vitals:  height is 5' 4" (1.626 m) and weight is 80.7 kg (178 lb). Her temperature is 98.3 °F (36.8 °C). Her blood pressure is 118/84 and her pulse is 107. Her respiration is 16 and oxygen saturation is 97%.     Chief Complaint: Sore Throat    Other  This is a new problem. The current episode started yesterday. The problem occurs constantly. The problem has been gradually worsening. Associated symptoms include congestion, a fever and a sore throat. Pertinent negatives include no abdominal pain, anorexia, arthralgias, change in bowel habit, chest pain, chills, coughing, diaphoresis, fatigue, headaches, joint swelling, myalgias, nausea, neck pain, numbness, rash, swollen glands, urinary symptoms, vertigo, visual change, vomiting or weakness. Nothing aggravates the symptoms. Treatments tried: OTC meds. The treatment provided mild relief.     Constitution: Positive for fever. Negative for chills, sweating and fatigue.   HENT:  Positive for congestion, postnasal drip, sinus pressure and sore throat. Negative for ear pain, drooling, nosebleeds, foreign body in nose, sinus pain, trouble swallowing and voice change.    Neck: Negative for neck pain, neck stiffness, painful lymph nodes and neck swelling.   Cardiovascular:  Negative for chest pain, leg swelling, palpitations, sob on exertion and passing out.   Eyes:  Negative for eye pain, eye redness, photophobia, double vision, blurred vision and eyelid swelling.   Respiratory:  Negative for chest tightness, cough, sputum production, bloody sputum, shortness of breath, stridor and wheezing.    Gastrointestinal:  Negative for abdominal pain, abdominal bloating, nausea, vomiting, constipation, diarrhea and heartburn.   Musculoskeletal:  Negative for joint pain, joint swelling, abnormal ROM of joint, back pain, muscle cramps and muscle ache.   Skin:  Negative for rash and hives. "   Allergic/Immunologic: Negative for seasonal allergies, food allergies, hives, itching and sneezing.   Neurological:  Negative for dizziness, history of vertigo, light-headedness, passing out, loss of balance, headaches, altered mental status, loss of consciousness, numbness and seizures.   Hematologic/Lymphatic: Negative for swollen lymph nodes.   Psychiatric/Behavioral:  Negative for altered mental status and nervous/anxious. The patient is not nervous/anxious.     Objective:     Physical Exam   Constitutional: She is oriented to person, place, and time. She appears well-developed. She is cooperative.  Non-toxic appearance. She does not appear ill. No distress.   HENT:   Head: Normocephalic and atraumatic.   Ears:   Right Ear: External ear normal.   Left Ear: External ear normal.   Nose: Mucosal edema and rhinorrhea present. No nasal deformity. No epistaxis. Right sinus exhibits no maxillary sinus tenderness and no frontal sinus tenderness. Left sinus exhibits no maxillary sinus tenderness and no frontal sinus tenderness.   Mouth/Throat: Uvula is midline and mucous membranes are normal. No trismus in the jaw. Normal dentition. No uvula swelling. Posterior oropharyngeal erythema and cobblestoning present. No oropharyngeal exudate or posterior oropharyngeal edema.   Eyes: Conjunctivae and lids are normal. No scleral icterus.   Neck: Trachea normal and phonation normal. Neck supple. No edema present. No erythema present. No neck rigidity present.   Cardiovascular: Normal rate, regular rhythm, normal heart sounds and normal pulses.   Pulmonary/Chest: Effort normal and breath sounds normal. No accessory muscle usage or stridor. No respiratory distress. She has no decreased breath sounds. She has no wheezes. She has no rhonchi. She has no rales.   Abdominal: Normal appearance.   Musculoskeletal: Normal range of motion.         General: No deformity. Normal range of motion.   Lymphadenopathy:     She has no cervical  adenopathy.   Neurological: She is alert and oriented to person, place, and time. She exhibits normal muscle tone. Coordination normal.   Skin: Skin is warm, dry, intact, not diaphoretic, not pale and no rash. Capillary refill takes less than 2 seconds.   Psychiatric: Her speech is normal and behavior is normal. Judgment and thought content normal.   Nursing note and vitals reviewed.    Results for orders placed or performed in visit on 05/30/23   SARS Coronavirus 2 Antigen, POCT Manual Read   Result Value Ref Range    SARS Coronavirus 2 Antigen Negative Negative     Acceptable Yes    POCT Strep A, Molecular   Result Value Ref Range    Molecular Strep A, POC Negative Negative     Acceptable Yes    POCT Infectious mononucleosis antibody   Result Value Ref Range    Monospot Negative Negative     Acceptable Yes      Assessment:     1. Sore throat        Plan:   Discussed COVID-19, strep and mono results with patient. Advised close follow-up with PCP and/or Specialist for further evaluation as needed. ER precautions given to patient as well. Patient aware, verbalized understanding and agreed with plan of care.    Sore throat  -     SARS Coronavirus 2 Antigen, POCT Manual Read  -     POCT Strep A, Molecular  -     POCT Infectious mononucleosis antibody      Patient Instructions   You must understand that you've received an Urgent Care treatment only and that you may be released before all your medical problems are known or treated. You, the patient, will arrange for follow up care as instructed.  Follow up with your PCP or specialty clinic as directed if not improved or as needed. You can call [phone] to schedule an appointment with the appropriate provider.  If your condition worsens we recommend that you receive another evaluation at the Emergency Department for any concerns or worsening of condition.  Patient aware and verbalized understanding.    Reviewed strep and  COVID-19 and mono results with patient.  Increase fluids: Cool liquids as much as possible.   Avoid any foods or beverages that may cause irritation to the throat (spicy, acidic, rough, etc.).  Rest is important.  Avoid contact with sick individuals.  Humidifier use at home.  THROW AWAY TOOTHBRUSH AND START WITH NEW ONE AS DISCUSSED.  AVOID SHARING FOOD/DRINK AS DISCUSSED.   Can take OTC Claritin or Zyrtec or Allegra (plain) daily as needed for seasonal allergies/nasal congestion, etc.  Can take OTC Flonase Nasal [place] as needed for nasal congestion/seasonal allergies, etc.  Can take OTC Tylenol or Motrin UNLESS CONTRAINDICATED (liver and/or kidney issues, etc.) every 4 - 6 hours as needed for fever or pain, etc.  Follow-up with your PCP in the next 24-72hrs or sooner for re-evaluation especially if no improvement in symptoms.  ER precautions given to patient.  Patient aware, verbalized understanding and agreed with plan of care.

## 2023-06-01 ENCOUNTER — OFFICE VISIT (OUTPATIENT)
Dept: PRIMARY CARE CLINIC | Facility: CLINIC | Age: 23
End: 2023-06-01
Payer: COMMERCIAL

## 2023-06-01 ENCOUNTER — PATIENT MESSAGE (OUTPATIENT)
Dept: PRIMARY CARE CLINIC | Facility: CLINIC | Age: 23
End: 2023-06-01

## 2023-06-01 DIAGNOSIS — J32.9 SINUSITIS, UNSPECIFIED CHRONICITY, UNSPECIFIED LOCATION: Primary | ICD-10-CM

## 2023-06-01 DIAGNOSIS — J45.901 MODERATE ASTHMA WITH EXACERBATION, UNSPECIFIED WHETHER PERSISTENT: Primary | ICD-10-CM

## 2023-06-01 PROCEDURE — 99442 PR PHYSICIAN TELEPHONE EVALUATION 11-20 MIN: ICD-10-PCS | Mod: 95,,, | Performed by: PHYSICIAN ASSISTANT

## 2023-06-01 PROCEDURE — 99442 PR PHYSICIAN TELEPHONE EVALUATION 11-20 MIN: CPT | Mod: 95,,, | Performed by: PHYSICIAN ASSISTANT

## 2023-06-01 PROCEDURE — 1159F PR MEDICATION LIST DOCUMENTED IN MEDICAL RECORD: ICD-10-PCS | Mod: CPTII,95,, | Performed by: PHYSICIAN ASSISTANT

## 2023-06-01 PROCEDURE — 1159F MED LIST DOCD IN RCRD: CPT | Mod: CPTII,95,, | Performed by: PHYSICIAN ASSISTANT

## 2023-06-01 PROCEDURE — 1160F PR REVIEW ALL MEDS BY PRESCRIBER/CLIN PHARMACIST DOCUMENTED: ICD-10-PCS | Mod: CPTII,95,, | Performed by: PHYSICIAN ASSISTANT

## 2023-06-01 PROCEDURE — 1160F RVW MEDS BY RX/DR IN RCRD: CPT | Mod: CPTII,95,, | Performed by: PHYSICIAN ASSISTANT

## 2023-06-01 RX ORDER — AZITHROMYCIN 250 MG/1
TABLET, FILM COATED ORAL
Qty: 6 TABLET | Refills: 0 | Status: SHIPPED | OUTPATIENT
Start: 2023-06-01 | End: 2023-12-22 | Stop reason: ALTCHOICE

## 2023-06-01 NOTE — PROGRESS NOTES
Answers submitted by the patient for this visit:  Sore Throat Questionnaire (Submitted on 6/1/2023)  Chief Complaint: Sore throat  Chronicity: new  Onset: in the past 7 days  Progression since onset: waxing and waning  Pain worse on: neither  Fever: 102 - 102.9 F  Fever duration: 3 to 4 days  Pain - numeric: 2/10  abdominal pain: No  congestion: Yes  cough: Yes  diarrhea: No  drooling: No  ear discharge: No  ear pain: No  headaches: No  hoarse voice: Yes  neck pain: Yes  plugged ear sensation: Yes  shortness of breath: No  stridor: Yes  swollen glands: Yes  trouble swallowing: Yes  vomiting: No  strep: No  mono: No  Treatments tried: acetaminophen, cool liquids, gargles  Improvement on treatment: no relief  Pain severity: moderate

## 2023-06-01 NOTE — PROGRESS NOTES
Subjective     Patient ID: Almas Colon is a 23 y.o. female.    Chief Complaint: No chief complaint on file.    The patient location is: [place], [place]  The chief complaint leading to consultation is: sinus    Visit type: audio only    Face to Face time with patient: 12 minutes of total time spent on the encounter, which includes face to face time and non-face to face time preparing to see the patient (eg, review of tests), Obtaining and/or reviewing separately obtained history, Documenting clinical information in the electronic or other health record, Independently interpreting results (not separately reported) and communicating results to the patient/family/caregiver, or Care coordination (not separately reported).         Each patient to whom he or she provides medical services by telemedicine is:  (1) informed of the relationship between the physician and patient and the respective role of any other health care provider with respect to management of the patient; and (2) notified that he or she may decline to receive medical services by telemedicine and may withdraw from such care at any time.    Notes:        Sore Throat   This is a new problem. The current episode started in the past 7 days. The problem has been waxing and waning. Neither side of throat is experiencing more pain than the other. The maximum temperature recorded prior to her arrival was 102 - 102.9 F. The fever has been present for 3 to 4 days. The pain is at a severity of 2/10. The pain is moderate. Associated symptoms include congestion, coughing, a hoarse voice, a plugged ear sensation, neck pain, stridor, swollen glands and trouble swallowing. Pertinent negatives include no abdominal pain, diarrhea, drooling, ear discharge, ear pain, headaches, shortness of breath or vomiting. She has had no exposure to strep or mono. She has tried acetaminophen, cool liquids and gargles for the symptoms. The treatment provided no relief.   Review  of Systems   HENT:  Positive for nasal congestion, hoarse voice, sore throat and trouble swallowing. Negative for drooling, ear discharge and ear pain.    Respiratory:  Positive for cough and stridor. Negative for shortness of breath.    Gastrointestinal:  Negative for abdominal pain, diarrhea and vomiting.   Musculoskeletal:  Positive for neck pain.   Neurological:  Negative for headaches.        Objective     Physical Exam       Assessment and Plan     1. Sinusitis, unspecified chronicity, unspecified location  -     azithromycin (Z-IBETH) 250 MG tablet; Follow instructions on pack.  Dispense: 6 tablet; Refill: 0        Sinusitis, unspecified chronicity, unspecified location  -     azithromycin (Z-IBETH) 250 MG tablet; Follow instructions on pack.  Dispense: 6 tablet; Refill: 0              Follow up prn

## 2023-06-05 RX ORDER — BUDESONIDE AND FORMOTEROL FUMARATE DIHYDRATE 160; 4.5 UG/1; UG/1
2 AEROSOL RESPIRATORY (INHALATION) EVERY 12 HOURS
Qty: 10.2 G | Refills: 2 | Status: SHIPPED | OUTPATIENT
Start: 2023-06-05 | End: 2024-06-04

## 2023-06-05 NOTE — TELEPHONE ENCOUNTER
Patient requesting Symbicort inhaler due to asthma flare up secondary to present infection. Presently using Albuterol nebulizer PRN     [facility] in chart

## 2023-06-22 DIAGNOSIS — F90.2 ATTENTION DEFICIT HYPERACTIVITY DISORDER (ADHD), COMBINED TYPE: ICD-10-CM

## 2023-06-22 RX ORDER — LISDEXAMFETAMINE DIMESYLATE 30 MG/1
30 CAPSULE ORAL EVERY MORNING
Qty: 30 CAPSULE | Refills: 0 | Status: SHIPPED | OUTPATIENT
Start: 2023-06-22 | End: 2023-07-28 | Stop reason: SDUPTHER

## 2023-06-27 ENCOUNTER — OFFICE VISIT (OUTPATIENT)
Dept: PSYCHIATRY | Facility: CLINIC | Age: 23
End: 2023-06-27
Payer: COMMERCIAL

## 2023-06-27 DIAGNOSIS — F41.1 GAD (GENERALIZED ANXIETY DISORDER): ICD-10-CM

## 2023-06-27 DIAGNOSIS — F43.23 ADJUSTMENT DISORDER WITH MIXED ANXIETY AND DEPRESSED MOOD: ICD-10-CM

## 2023-06-27 DIAGNOSIS — F90.2 ATTENTION DEFICIT HYPERACTIVITY DISORDER (ADHD), COMBINED TYPE: Primary | ICD-10-CM

## 2023-06-27 PROCEDURE — 1160F RVW MEDS BY RX/DR IN RCRD: CPT | Mod: CPTII,95,, | Performed by: PHYSICIAN ASSISTANT

## 2023-06-27 PROCEDURE — 1159F PR MEDICATION LIST DOCUMENTED IN MEDICAL RECORD: ICD-10-PCS | Mod: CPTII,95,, | Performed by: PHYSICIAN ASSISTANT

## 2023-06-27 PROCEDURE — 1160F PR REVIEW ALL MEDS BY PRESCRIBER/CLIN PHARMACIST DOCUMENTED: ICD-10-PCS | Mod: CPTII,95,, | Performed by: PHYSICIAN ASSISTANT

## 2023-06-27 PROCEDURE — 1159F MED LIST DOCD IN RCRD: CPT | Mod: CPTII,95,, | Performed by: PHYSICIAN ASSISTANT

## 2023-06-27 PROCEDURE — 99214 PR OFFICE/OUTPT VISIT, EST, LEVL IV, 30-39 MIN: ICD-10-PCS | Mod: 95,,, | Performed by: PHYSICIAN ASSISTANT

## 2023-06-27 PROCEDURE — 99214 OFFICE O/P EST MOD 30 MIN: CPT | Mod: 95,,, | Performed by: PHYSICIAN ASSISTANT

## 2023-06-27 NOTE — PROGRESS NOTES
The patient location is: at work in [place], [place]  The chief complaint leading to consultation is: ADHD    Visit type: audiovisual    Face to Face time with patient: 20  30 minutes of total time spent on the encounter, which includes face to face time and non-face to face time preparing to see the patient (eg, review of tests), Obtaining and/or reviewing separately obtained history, Documenting clinical information in the electronic or other health record, Independently interpreting results (not separately reported) and communicating results to the patient/family/caregiver, or Care coordination (not separately reported).     Each patient to whom he or she provides medical services by telemedicine is:  (1) informed of the relationship between the physician and patient and the respective role of any other health care provider with respect to management of the patient; and (2) notified that he or she may decline to receive medical services by telemedicine and may withdraw from such care at any time.    Outpatient Psychiatry Follow-Up Visit (MD/NP)    6/27/2023    Clinical Status of Patient:  Outpatient (Ambulatory)    Chief Complaint:  Almas Chowdhury Isaiah is a 23 y.o. female who presents today for follow-up of depression, anxiety and attention problems.  Met with patient.     Interval History and Content of Current Session:  Interim Events/Subjective Report/Content of Current Session:  Almas is seen virtually today for medication follow-up.  Patient reports that she has been doing well.  She is primarily just been working and going to the gym.  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  She states that work is like a big puzzle.  She is been going to the gym at least five days per week.  Primarily doing weight lifting and then adding in some cardio.  She feels that the exercise primarily helps her mental  health but she is also had some weight loss.  She states that she was sick three weeks ago which kind of kicked started the weight loss aspect but then she has been trying to reduce calories a bit.  She was seen in ex-boyfriend which she is no longer seeing him, which she states is for the best.  We discussed in the future, if anxiety reoccurs, we may consider SNRI she may benefit more from norepinephrine side of medication.  She denies suicidal or homicidal ideation.  No other complaints today.    FROM PREVIOUS HPI  Almas is seen today for medication follow-up.  She reports that she is doing okay.  Weight is reviewed and is stable.  She states she loves her job.  She felt that buspirone was not helpful in conjunction with Vyvanse.  She stopped buspirone about 1-2 weeks ago.  She does feel that her anxiety acts up at times.  She feels somewhat OCD like.  She is not tolerated serotonergic medications in the past and does not wish to start one.  We discussed typical treatment OCD type symptoms.  She reports she is looking forward to the weekend as her niece is turning one.  She is still seeing her significant other and the relationship is stable.  Patient denies suicidal or homicidal ideation.  No other complaints today.    GAD7 6/27/2023 3/31/2023 12/16/2022   1. Feeling nervous, anxious, or on edge? 0 1 0   2. Not being able to stop or control worrying? 0 0 0   3. Worrying too much about different things? 0 0 0   4. Trouble relaxing? 0 0 0   5. Being so restless that it is hard to sit still? 0 0 0   6. Becoming easily annoyed or irritable? 0 1 1   7. Feeling afraid as if something awful might happen? 0 0 0   8. If you checked off any problems, how difficult have these problems made it for you to do your work, take care of things at home, or get along with other people? 0 1 1   MATHEW-7 Score 0 2 1     PHQ9 3/31/2023   Little interest or pleasure in doing things: Several days   Feeling down, depressed or hopeless:  Not at all   Trouble falling asleep, staying asleep, or sleeping too much: Not at all   Feeling tired or having little energy: Several days   Poor appetite or overeating: Several days   Feeling bad about yourself - or that you are a failure or have let yourself or family down: Not at all   Trouble concentrating on things, such as reading the newspaper or watching television: Not at all   Moving or speaking so slowly that other people could have noticed. Or the opposite,being so fidgety or restless that you have been moving around a lot more than usual: Not at all   Thoughts that you would be better off dead or hurting yourself in some way: Not at all   If you indicated you have experienced any of the previous problems, how difficult have these problems made it for you to do your work, take care of things at home or get along with other people? Somewhat difficult   Total Score 3       Review of Systems   PSYCHIATRIC: Pertinant items are noted in the narrative.  RESPIRATORY: No shortness of breath.  CARDIOVASCULAR: No tachycardia or chest pain.  GASTROINTESTINAL: No nausea, vomiting, pain, constipation or diarrhea.    Past Medical, Family and Social History: The patient's past medical, family and social history have been reviewed and updated as appropriate within the electronic medical record - see encounter notes.    Compliance: yes    Side effects: none    Risk Parameters:  Patient reports no suicidal ideation  Patient reports no homicidal ideation  Patient reports no self-injurious behavior  Patient reports no violent behavior    Exam (detailed: at least 9 elements; comprehensive: all 15 elements)   Constitutional  Vitals:  Most recent vital signs, dated less than 90 days prior to this appointment, were reviewed.   There were no vitals filed for this visit.         General:  unremarkable, age appropriate, casually dressed     Musculoskeletal  Muscle Strength/Tone:  no dyskinesia   Gait & Station:  Virtual visit      Psychiatric  Speech:  no latency; no press   Mood & Affect:  good  congruent and appropriate   Thought Process:  normal and logical   Associations:  intact   Thought Content:  normal, no suicidality, no homicidality, delusions, or paranoia   Insight:  intact   Judgement: behavior is adequate to circumstances   Orientation:  grossly intact   Memory: intact for content of interview   Language: grossly intact   Attention Span & Concentration:  able to focus   Fund of Knowledge:  intact and appropriate to age and level of education     Assessment and Diagnosis   Status/Progress: Based on the examination today, the patient's problem(s) is/are improved.  New problems have not been presented today.   Co-morbidities, Diagnostic uncertainty and Lack of compliance are not complicating management of the primary condition.     General Impression:   Adjustment disorder  ADHD, combined presentation type    Intervention/Counseling/Treatment Plan   Medication Management: Continue current medications. The risks and benefits of medication were discussed with the patient.  Counseling provided with patient as follows: importance of compliance with chosen treatment options was emphasized, risks and benefits of treatment options, including medications, were discussed with the patient, risk factor reduction, prognosis    Almas is seen today for medication follow up.     Continue Vyvanse 30 mg daily for inattention/concentration deficits.  Consider Pristiq in the future if mood/anxiety symptoms reoccur.     Please go to emergency department if feeling as though you are a harm to yourself or others or if you are in crisis.     Please call the clinic to report any worsening of symptoms or problems associated with medication.    Cardiovascular events: [US Boxed Warning]: Use has been associated with serious cardiovascular events including sudden death in patients with preexisting structural cardiac abnormalities or other serious heart  problems (sudden death in children and adolescents; sudden death, stroke, and MI in adults). Consistent with other studies, a large retrospective cohort study involving 1,200,438 children, adolescents, and young adults (aged 2 to 24 years) prescribed methylphenidate, dexmethylphenidate, dextroamphetamines, amphetamine salts, pemoline, or atomoxetine found no evidence that current use of an ADHD medication increased risk for sudden cardiac death, acute MI, or stroke (Sin 2011). Stimulants should be avoided in patients with known structural cardiac abnormalities, cardiomyopathy, serious heart rhythm abnormalities, coronary artery disease, Marfan syndrome, or other serious cardiac problems. Some products are contraindicated in patients with moderate or severe hypertension. Prior to initiating stimulant, assess medical history and family history of sudden death or ventricular arrhythmia; conduct a physical exam to assess for cardiac disease; patients should receive further evaluation if findings suggest cardiac disease, such as ECG and echocardiogram. Promptly conduct cardiac evaluation in patients who develop exertional chest pain, unexplained syncope, or any other symptoms of cardiac disease during stimulant treatment.      Return to Clinic:3 months, as needed.

## 2023-07-28 DIAGNOSIS — F90.2 ATTENTION DEFICIT HYPERACTIVITY DISORDER (ADHD), COMBINED TYPE: ICD-10-CM

## 2023-07-28 RX ORDER — LISDEXAMFETAMINE DIMESYLATE 30 MG/1
30 CAPSULE ORAL EVERY MORNING
Qty: 30 CAPSULE | Refills: 0 | Status: SHIPPED | OUTPATIENT
Start: 2023-07-28 | End: 2023-08-30 | Stop reason: SDUPTHER

## 2023-08-30 DIAGNOSIS — F90.2 ATTENTION DEFICIT HYPERACTIVITY DISORDER (ADHD), COMBINED TYPE: ICD-10-CM

## 2023-08-30 RX ORDER — LISDEXAMFETAMINE DIMESYLATE 30 MG/1
30 CAPSULE ORAL EVERY MORNING
Qty: 30 CAPSULE | Refills: 0 | Status: SHIPPED | OUTPATIENT
Start: 2023-08-30 | End: 2023-09-01 | Stop reason: SDUPTHER

## 2023-09-01 ENCOUNTER — PATIENT MESSAGE (OUTPATIENT)
Dept: PSYCHIATRY | Facility: CLINIC | Age: 23
End: 2023-09-01
Payer: COMMERCIAL

## 2023-09-01 ENCOUNTER — TELEPHONE (OUTPATIENT)
Dept: PSYCHIATRY | Facility: CLINIC | Age: 23
End: 2023-09-01
Payer: COMMERCIAL

## 2023-09-01 DIAGNOSIS — F90.2 ATTENTION DEFICIT HYPERACTIVITY DISORDER (ADHD), COMBINED TYPE: ICD-10-CM

## 2023-09-01 RX ORDER — LISDEXAMFETAMINE DIMESYLATE 30 MG/1
30 CAPSULE ORAL EVERY MORNING
Qty: 30 CAPSULE | Refills: 0 | Status: SHIPPED | OUTPATIENT
Start: 2023-09-01 | End: 2023-09-26 | Stop reason: SDUPTHER

## 2023-09-01 NOTE — TELEPHONE ENCOUNTER
----- Message from James Rai sent at 9/1/2023 12:13 PM CDT -----  Type: Needs Medical Advice  Who Called:  pt  Pharmacy name and phone #:    [facility] [place]  [address] [facility] [address] [place] [address]  Phone: [phone] Fax: [phone]  Best Call Back Number: [phone]  Additional Information: pt is calling the office to have her Rx for Vyvanse sent to [facility] in [place]. The pt states this ia an emergency and she needs this taken care of today. She cannot function with out her medication. Please call back to advise Thanks!

## 2023-09-26 ENCOUNTER — OFFICE VISIT (OUTPATIENT)
Dept: PSYCHIATRY | Facility: CLINIC | Age: 23
End: 2023-09-26
Payer: COMMERCIAL

## 2023-09-26 DIAGNOSIS — F90.2 ATTENTION DEFICIT HYPERACTIVITY DISORDER (ADHD), COMBINED TYPE: Primary | ICD-10-CM

## 2023-09-26 DIAGNOSIS — F41.1 GAD (GENERALIZED ANXIETY DISORDER): ICD-10-CM

## 2023-09-26 PROCEDURE — 1160F PR REVIEW ALL MEDS BY PRESCRIBER/CLIN PHARMACIST DOCUMENTED: ICD-10-PCS | Mod: CPTII,95,, | Performed by: PHYSICIAN ASSISTANT

## 2023-09-26 PROCEDURE — 99214 PR OFFICE/OUTPT VISIT, EST, LEVL IV, 30-39 MIN: ICD-10-PCS | Mod: 95,,, | Performed by: PHYSICIAN ASSISTANT

## 2023-09-26 PROCEDURE — 1159F MED LIST DOCD IN RCRD: CPT | Mod: CPTII,95,, | Performed by: PHYSICIAN ASSISTANT

## 2023-09-26 PROCEDURE — 99214 OFFICE O/P EST MOD 30 MIN: CPT | Mod: 95,,, | Performed by: PHYSICIAN ASSISTANT

## 2023-09-26 PROCEDURE — 1159F PR MEDICATION LIST DOCUMENTED IN MEDICAL RECORD: ICD-10-PCS | Mod: CPTII,95,, | Performed by: PHYSICIAN ASSISTANT

## 2023-09-26 PROCEDURE — 1160F RVW MEDS BY RX/DR IN RCRD: CPT | Mod: CPTII,95,, | Performed by: PHYSICIAN ASSISTANT

## 2023-09-26 RX ORDER — LISDEXAMFETAMINE DIMESYLATE 30 MG/1
30 CAPSULE ORAL EVERY MORNING
Qty: 30 CAPSULE | Refills: 0 | Status: SHIPPED | OUTPATIENT
Start: 2023-09-29 | End: 2023-11-20 | Stop reason: SDUPTHER

## 2023-09-26 RX ORDER — BUSPIRONE HYDROCHLORIDE 5 MG/1
5 TABLET ORAL NIGHTLY
Start: 2023-09-26 | End: 2023-12-17

## 2023-09-26 NOTE — PROGRESS NOTES
The patient location is: at work in [place], [place]  The chief complaint leading to consultation is: ADHD    Visit type: audiovisual    Face to Face time with patient: 20  30 minutes of total time spent on the encounter, which includes face to face time and non-face to face time preparing to see the patient (eg, review of tests), Obtaining and/or reviewing separately obtained history, Documenting clinical information in the electronic or other health record, Independently interpreting results (not separately reported) and communicating results to the patient/family/caregiver, or Care coordination (not separately reported).     Each patient to whom he or she provides medical services by telemedicine is:  (1) informed of the relationship between the physician and patient and the respective role of any other health care provider with respect to management of the patient; and (2) notified that he or she may decline to receive medical services by telemedicine and may withdraw from such care at any time.    Outpatient Psychiatry Follow-Up Visit (MD/NP)    9/26/2023    Clinical Status of Patient:  Outpatient (Ambulatory)    Chief Complaint:  Almas Chowdhury Isaiah is a 23 y.o. female who presents today for follow-up of depression, anxiety and attention problems.  Met with patient.     Interval History and Content of Current Session:  Interim Events/Subjective Report/Content of Current Session:  Almas is seen virtually today for medication follow-up.  She reports that she is been doing well.  Work is going well and she has been exercising regularly.  Currently living with her mom and feels that she does need her own place.  Her sisters are well.  She did restart BuSpar 5 mg nightly 2-3 weeks ago for anxiety and she feels like it is very helpful.  She is interested in increasing Vyvanse but feels that she would not tolerate the side effects as she does feel that it stimulates anxiety.  She denies suicidal or homicidal  ideation.  Sleep and appetite are adequate.  No other complaints today.    FROM PREVIOUS HPI  Almas is seen virtually today for medication follow-up.  Patient reports that she has been doing well.  She is primarily just been working and going to the gym.  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  She states that work is like a big puzzle.  She is been going to the gym at least five days per week.  Primarily doing weight lifting and then adding in some cardio.  She feels that the exercise primarily helps her mental health but she is also had some weight loss.  She states that she was sick three weeks ago which kind of kicked started the weight loss aspect but then she has been trying to reduce calories a bit.  She was seen in ex-boyfriend which she is no longer seeing him, which she states is for the best.  We discussed in the future, if anxiety reoccurs, we may consider SNRI she may benefit more from norepinephrine side of medication.  She denies suicidal or homicidal ideation.  No other complaints today.          9/26/2023    12:29 PM 6/27/2023    12:33 PM 3/31/2023    11:15 AM   GAD7   1. Feeling nervous, anxious, or on edge? 0 0 1   2. Not being able to stop or control worrying? 0 0 0   3. Worrying too much about different things? 0 0 0   4. Trouble relaxing? 0 0 0   5. Being so restless that it is hard to sit still? 0 0 0   6. Becoming easily annoyed or irritable? 0 0 1   7. Feeling afraid as if something awful might happen? 0 0 0   8. If you checked off any problems, how difficult have these problems made it for you to do your work, take care of things at home, or get along with other people? 0 0 1   MATHEW-7 Score 0 0 2       Review of Systems   PSYCHIATRIC: Pertinant items are noted in the narrative.  RESPIRATORY: No shortness of breath.  CARDIOVASCULAR: No tachycardia or chest pain.  GASTROINTESTINAL: No  nausea, vomiting, pain, constipation or diarrhea.    Past Medical, Family and Social History: The patient's past medical, family and social history have been reviewed and updated as appropriate within the electronic medical record - see encounter notes.    Compliance: yes    Side effects: none    Risk Parameters:  Patient reports no suicidal ideation  Patient reports no homicidal ideation  Patient reports no self-injurious behavior  Patient reports no violent behavior    Exam (detailed: at least 9 elements; comprehensive: all 15 elements)   Constitutional  Vitals:  Most recent vital signs, dated less than 90 days prior to this appointment, were reviewed.   There were no vitals filed for this visit.         General:  unremarkable, age appropriate, casually dressed     Musculoskeletal  Muscle Strength/Tone:  no dyskinesia   Gait & Station:  Virtual visit     Psychiatric  Speech:  no latency; no press   Mood & Affect:  good  congruent and appropriate   Thought Process:  normal and logical   Associations:  intact   Thought Content:  normal, no suicidality, no homicidality, delusions, or paranoia   Insight:  intact   Judgement: behavior is adequate to circumstances   Orientation:  grossly intact   Memory: intact for content of interview   Language: grossly intact   Attention Span & Concentration:  able to focus   Fund of Knowledge:  intact and appropriate to age and level of education     Assessment and Diagnosis   Status/Progress: Based on the examination today, the patient's problem(s) is/are improved.  New problems have not been presented today.   Co-morbidities, Diagnostic uncertainty and Lack of compliance are not complicating management of the primary condition.     General Impression:   Adjustment disorder  ADHD, combined presentation type    Intervention/Counseling/Treatment Plan   Medication Management: Continue current medications. The risks and benefits of medication were discussed with the  patient.  Counseling provided with patient as follows: importance of compliance with chosen treatment options was emphasized, risks and benefits of treatment options, including medications, were discussed with the patient, risk factor reduction, prognosis    Almas is seen today for medication follow up.     Continue Vyvanse 30 mg daily for inattention/concentration deficits.  BuSpar 5 mg nightly has been added to her medication list, no prescription sent in at this time because she has adequate supply.  She will notify us when a new prescription is needed.    Please go to emergency department if feeling as though you are a harm to yourself or others or if you are in crisis.     Please call the clinic to report any worsening of symptoms or problems associated with medication.    Cardiovascular events: [US Boxed Warning]: Use has been associated with serious cardiovascular events including sudden death in patients with preexisting structural cardiac abnormalities or other serious heart problems (sudden death in children and adolescents; sudden death, stroke, and MI in adults). Consistent with other studies, a large retrospective cohort study involving 1,200,438 children, adolescents, and young adults (aged 2 to 24 years) prescribed methylphenidate, dexmethylphenidate, dextroamphetamines, amphetamine salts, pemoline, or atomoxetine found no evidence that current use of an ADHD medication increased risk for sudden cardiac death, acute MI, or stroke (Sin 2011). Stimulants should be avoided in patients with known structural cardiac abnormalities, cardiomyopathy, serious heart rhythm abnormalities, coronary artery disease, Marfan syndrome, or other serious cardiac problems. Some products are contraindicated in patients with moderate or severe hypertension. Prior to initiating stimulant, assess medical history and family history of sudden death or ventricular arrhythmia; conduct a physical exam to assess for cardiac  disease; patients should receive further evaluation if findings suggest cardiac disease, such as ECG and echocardiogram. Promptly conduct cardiac evaluation in patients who develop exertional chest pain, unexplained syncope, or any other symptoms of cardiac disease during stimulant treatment.      Return to Clinic: 3 months, as needed.

## 2023-10-10 ENCOUNTER — OFFICE VISIT (OUTPATIENT)
Dept: URGENT CARE | Facility: CLINIC | Age: 23
End: 2023-10-10
Payer: COMMERCIAL

## 2023-10-10 VITALS
BODY MASS INDEX: 28.17 KG/M2 | RESPIRATION RATE: 15 BRPM | HEIGHT: 64 IN | HEART RATE: 97 BPM | DIASTOLIC BLOOD PRESSURE: 86 MMHG | TEMPERATURE: 98 F | SYSTOLIC BLOOD PRESSURE: 134 MMHG | WEIGHT: 165 LBS | OXYGEN SATURATION: 99 %

## 2023-10-10 DIAGNOSIS — R30.0 DYSURIA: Primary | ICD-10-CM

## 2023-10-10 LAB
BILIRUB UR QL STRIP: NEGATIVE
GLUCOSE UR QL STRIP: NEGATIVE
KETONES UR QL STRIP: NEGATIVE
LEUKOCYTE ESTERASE UR QL STRIP: NEGATIVE
PH, POC UA: 6.5 (ref 5–8)
POC BLOOD, URINE: NEGATIVE
POC NITRATES, URINE: NEGATIVE
PROT UR QL STRIP: NEGATIVE
SP GR UR STRIP: 1.01 (ref 1–1.03)
UROBILINOGEN UR STRIP-ACNC: NORMAL (ref 0.1–1.1)

## 2023-10-10 PROCEDURE — 99213 OFFICE O/P EST LOW 20 MIN: CPT | Mod: S$GLB,,, | Performed by: PHYSICIAN ASSISTANT

## 2023-10-10 PROCEDURE — 81003 POCT URINALYSIS, DIPSTICK, AUTOMATED, W/O SCOPE: ICD-10-PCS | Mod: QW,S$GLB,, | Performed by: PHYSICIAN ASSISTANT

## 2023-10-10 PROCEDURE — 81003 URINALYSIS AUTO W/O SCOPE: CPT | Mod: QW,S$GLB,, | Performed by: PHYSICIAN ASSISTANT

## 2023-10-10 PROCEDURE — 99213 PR OFFICE/OUTPT VISIT, EST, LEVL III, 20-29 MIN: ICD-10-PCS | Mod: S$GLB,,, | Performed by: PHYSICIAN ASSISTANT

## 2023-10-10 RX ORDER — NITROFURANTOIN 25; 75 MG/1; MG/1
100 CAPSULE ORAL 2 TIMES DAILY
Qty: 10 CAPSULE | Refills: 0 | Status: SHIPPED | OUTPATIENT
Start: 2023-10-10 | End: 2023-10-15

## 2023-10-10 NOTE — PROGRESS NOTES
"Subjective:      Patient ID: Almas Colon is a 23 y.o. female.    Vitals:  height is 5' 4" (1.626 m) and weight is 74.8 kg (165 lb). Her temperature is 98.4 °F (36.9 °C). Her blood pressure is 134/86 and her pulse is 97. Her respiration is 15 and oxygen saturation is 99%.     Chief Complaint: Urinary Tract Infection    Patient presents to clinic with complaint of UTI symptoms. Symptoms started yesterday.     Urinary Tract Infection   This is a new problem. The current episode started yesterday. The problem occurs every urination. The problem has been unchanged. The quality of the pain is described as aching. The pain is at a severity of 4/10. The pain is mild. There has been no fever. She is Sexually active. There is No history of pyelonephritis. Associated symptoms include frequency and urgency. Pertinent negatives include no behavior changes, chills, discharge, flank pain, hematuria, hesitancy, nausea, possible pregnancy, sweats, vomiting, weight loss, bubble bath use, constipation, rash or withholding.       Constitution: Negative for chills and fever.   Gastrointestinal:  Negative for nausea, vomiting and constipation.   Genitourinary:  Positive for dysuria, frequency and urgency. Negative for flank pain and hematuria.   Skin:  Negative for rash.      Objective:     Physical Exam   Constitutional: She does not appear ill. No distress.   HENT:   Head: Normocephalic and atraumatic.   Ears:   Right Ear: External ear normal.   Left Ear: External ear normal.   Eyes: Conjunctivae are normal. Right eye exhibits no discharge. Left eye exhibits no discharge. Extraocular movement intact   Cardiovascular: Normal rate, regular rhythm and normal heart sounds.   No murmur heard.  Pulmonary/Chest: Effort normal. No respiratory distress.   Abdominal: Normal appearance. Soft. There is no abdominal tenderness. There is no left CVA tenderness and no right CVA tenderness.   Musculoskeletal: Normal range of motion.       "   General: Normal range of motion.   Neurological: no focal deficit. She is alert.   Skin: Skin is warm, dry and not pale. jaundice  Psychiatric: Her behavior is normal. Mood, judgment and thought content normal.   Nursing note and vitals reviewed.      Assessment:     1. Dysuria        Plan:       Dysuria  -     POCT Urinalysis, Dipstick, Automated, W/O Scope    Other orders  -     nitrofurantoin, macrocrystal-monohydrate, (MACROBID) 100 MG capsule; Take 1 capsule (100 mg total) by mouth 2 (two) times daily. for 5 days  Dispense: 10 capsule; Refill: 0      Patient Instructions   You must understand that you've received an Urgent Care treatment only and that you may be released before all your medical problems are known or treated. You, the patient, will arrange for follow up care as instructed.  Follow up with your PCP or specialty clinic as directed in the next 1-2 weeks if not improved or as needed.  You can call [phone] to schedule an appointment with the appropriate provider.  If your condition worsens we recommend that you receive another evaluation at the emergency room immediately or contact your primary medical clinics after hours call service to discuss your concerns.  Please return here or go to the Emergency Department for any concerns or worsening of condition.

## 2023-10-10 NOTE — PATIENT INSTRUCTIONS

## 2023-11-17 NOTE — TELEPHONE ENCOUNTER
Medication requested-vyvanse   Last RX-6-22-23   Qty-30  Refills-0  Last office [id number]  Next office [id number]    
PAIN SCALE 5 OF 10.

## 2023-11-20 DIAGNOSIS — F90.2 ATTENTION DEFICIT HYPERACTIVITY DISORDER (ADHD), COMBINED TYPE: ICD-10-CM

## 2023-11-21 RX ORDER — LISDEXAMFETAMINE DIMESYLATE 30 MG/1
30 CAPSULE ORAL EVERY MORNING
Qty: 30 CAPSULE | Refills: 0 | Status: SHIPPED | OUTPATIENT
Start: 2023-11-21 | End: 2023-12-17

## 2023-12-15 ENCOUNTER — OFFICE VISIT (OUTPATIENT)
Dept: PSYCHIATRY | Facility: CLINIC | Age: 23
End: 2023-12-15
Payer: COMMERCIAL

## 2023-12-15 ENCOUNTER — PATIENT MESSAGE (OUTPATIENT)
Dept: PSYCHIATRY | Facility: CLINIC | Age: 23
End: 2023-12-15
Payer: COMMERCIAL

## 2023-12-15 DIAGNOSIS — F43.23 ADJUSTMENT DISORDER WITH MIXED ANXIETY AND DEPRESSED MOOD: ICD-10-CM

## 2023-12-15 DIAGNOSIS — F90.2 ATTENTION DEFICIT HYPERACTIVITY DISORDER (ADHD), COMBINED TYPE: Primary | ICD-10-CM

## 2023-12-15 DIAGNOSIS — F41.1 GAD (GENERALIZED ANXIETY DISORDER): ICD-10-CM

## 2023-12-15 PROCEDURE — 99213 PR OFFICE/OUTPT VISIT, EST, LEVL III, 20-29 MIN: ICD-10-PCS | Mod: 95,,, | Performed by: PHYSICIAN ASSISTANT

## 2023-12-15 PROCEDURE — 99213 OFFICE O/P EST LOW 20 MIN: CPT | Mod: 95,,, | Performed by: PHYSICIAN ASSISTANT

## 2023-12-15 PROCEDURE — 1160F PR REVIEW ALL MEDS BY PRESCRIBER/CLIN PHARMACIST DOCUMENTED: ICD-10-PCS | Mod: CPTII,95,, | Performed by: PHYSICIAN ASSISTANT

## 2023-12-15 PROCEDURE — 1160F RVW MEDS BY RX/DR IN RCRD: CPT | Mod: CPTII,95,, | Performed by: PHYSICIAN ASSISTANT

## 2023-12-15 PROCEDURE — 1159F MED LIST DOCD IN RCRD: CPT | Mod: CPTII,95,, | Performed by: PHYSICIAN ASSISTANT

## 2023-12-15 PROCEDURE — 1159F PR MEDICATION LIST DOCUMENTED IN MEDICAL RECORD: ICD-10-PCS | Mod: CPTII,95,, | Performed by: PHYSICIAN ASSISTANT

## 2023-12-15 RX ORDER — SERTRALINE HYDROCHLORIDE 25 MG/1
25 TABLET, FILM COATED ORAL DAILY
Qty: 30 TABLET | Refills: 1 | Status: SHIPPED | OUTPATIENT
Start: 2023-12-15 | End: 2024-02-16 | Stop reason: SDUPTHER

## 2023-12-15 NOTE — PROGRESS NOTES
The patient location is: in her car in [place], [place]  The chief complaint leading to consultation is: ADHD    Visit type: audiovisual    Face to Face time with patient: 10  20 minutes of total time spent on the encounter, which includes face to face time and non-face to face time preparing to see the patient (eg, review of tests), Obtaining and/or reviewing separately obtained history, Documenting clinical information in the electronic or other health record, Independently interpreting results (not separately reported) and communicating results to the patient/family/caregiver, or Care coordination (not separately reported).     Each patient to whom he or she provides medical services by telemedicine is:  (1) informed of the relationship between the physician and patient and the respective role of any other health care provider with respect to management of the patient; and (2) notified that he or she may decline to receive medical services by telemedicine and may withdraw from such care at any time.    Outpatient Psychiatry Follow-Up Visit (MD/NP)    12/15/2023    Clinical Status of Patient:  Outpatient (Ambulatory)    Chief Complaint:  Almas Chowdhury Isaiah is a 23 y.o. female who presents today for follow-up of depression, anxiety and attention problems.  Met with patient.     Interval History and Content of Current Session:  Interim Events/Subjective Report/Content of Current Session:  Almas is seen virtually today for medication follow-up.  She recently found out that she was pregnant.  The father of the child is someone she had been seeing for a short period of time but she states that he is very supportive and is excited about the pregnancy.  She states that her sister and her mom are also very excited for the pregnancy.  She has not yet told other friends/family.  Feels that she is about six weeks pregnant at this time and stopped her medication two weeks ago.  We discussed stimulant implications during  first trimester.  Not currently taking any psychiatric medication.  Has not yet established with OBGYN but has upcoming appointment.  She does feel that she needs something for mood/anxiety.  Did well with sertraline previously but experienced weight gain.  She would like to resume sertraline at this time.  We will have close follow-up with her due to pregnancy.  She denies suicidal or homicidal ideation.  No other complaints today.    FROM PREVIOUS HPI  Almas is seen virtually today for medication follow-up.  She reports that she is been doing well.  Work is going well and she has been exercising regularly.  Currently living with her mom and feels that she does need her own place.  Her sisters are well.  She did restart BuSpar 5 mg nightly 2-3 weeks ago for anxiety and she feels like it is very helpful.  She is interested in increasing Vyvanse but feels that she would not tolerate the side effects as she does feel that it stimulates anxiety.  She denies suicidal or homicidal ideation.  Sleep and appetite are adequate.  No other complaints today.        12/15/2023    11:21 AM 9/26/2023    12:29 PM 6/27/2023    12:33 PM   GAD7   1. Feeling nervous, anxious, or on edge? 1 0 0   2. Not being able to stop or control worrying? 1 0 0   3. Worrying too much about different things? 0 0 0   4. Trouble relaxing? 0 0 0   5. Being so restless that it is hard to sit still? 0 0 0   6. Becoming easily annoyed or irritable? 1 0 0   7. Feeling afraid as if something awful might happen? 0 0 0   8. If you checked off any problems, how difficult have these problems made it for you to do your work, take care of things at home, or get along with other people? 1 0 0   MATHEW-7 Score 3 0 0       Review of Systems   PSYCHIATRIC: Pertinant items are noted in the narrative.  RESPIRATORY: No shortness of breath.  CARDIOVASCULAR: No tachycardia or chest pain.  GASTROINTESTINAL: No nausea, vomiting, pain, constipation or diarrhea.    Past  Medical, Family and Social History: The patient's past medical, family and social history have been reviewed and updated as appropriate within the electronic medical record - see encounter notes.    Compliance: yes    Side effects: none    Risk Parameters:  Patient reports no suicidal ideation  Patient reports no homicidal ideation  Patient reports no self-injurious behavior  Patient reports no violent behavior    Exam (detailed: at least 9 elements; comprehensive: all 15 elements)   Constitutional  Vitals:  Most recent vital signs, dated less than 90 days prior to this appointment, were reviewed.   There were no vitals filed for this visit.         General:  unremarkable, age appropriate, casually dressed     Musculoskeletal  Muscle Strength/Tone:  no dyskinesia   Gait & Station:  Virtual visit     Psychiatric  Speech:  no latency; no press   Mood & Affect:  good  congruent and appropriate   Thought Process:  normal and logical   Associations:  intact   Thought Content:  normal, no suicidality, no homicidality, delusions, or paranoia   Insight:  intact   Judgement: behavior is adequate to circumstances   Orientation:  grossly intact   Memory: intact for content of interview   Language: grossly intact   Attention Span & Concentration:  able to focus   Fund of Knowledge:  intact and appropriate to age and level of education     Assessment and Diagnosis   Status/Progress: Based on the examination today, the patient's problem(s) is/are improved.  New problems have not been presented today.   Co-morbidities, Diagnostic uncertainty and Lack of compliance are not complicating management of the primary condition.     General Impression:   Adjustment disorder  ADHD, combined presentation type  Recent confirmed pregnancy    Intervention/Counseling/Treatment Plan   Medication Management: Continue current medications. The risks and benefits of medication were discussed with the patient.  Counseling provided with patient as  follows: importance of compliance with chosen treatment options was emphasized, risks and benefits of treatment options, including medications, were discussed with the patient, risk factor reduction, prognosis    Almas is seen today for medication follow up.  Recently found out about unintended pregnancy but she is excited about this.  Discontinued Vyvanse and buspirone as soon as she found out she was pregnant.  We discussed first trimester implications with these medications and she is understanding.  Based on assessment:    Resume sertraline 25 mg once daily for mood/anxiety.    Please go to emergency department if feeling as though you are a harm to yourself or others or if you are in crisis.     Please call the clinic to report any worsening of symptoms or problems associated with medication.    This medication has US boxed warning that states there may be an increased risk of suicidal thoughts and behavior in pediatric and young adult patients in short-term studies. Closely monitor all antidepressant-treated patients for clinical worsening, and for emergence of suicidal thoughts and behaviors.    Return to Clinic: 1/12/2024, as needed

## 2023-12-15 NOTE — PATIENT INSTRUCTIONS
Trial sertraline (zoloft) 25mg once daily for mood/anxiety.     Please go to emergency department if feeling as though you are a harm to yourself or others or if you are in crisis. Please call the clinic to report any worsening of symptoms or problems associated with medication.    With upcoming holidays, I am sending along a quick reminder that our clinic is closed [place] and New Year's Day. I will be out of the clinic December 26-29. My colleague, Tommie Cruz, will be handling refill requests in my absence.  In the case of a medical or mental health emergency, we advise visiting your nearest emergency department or dialing 911 for emergency services.

## 2023-12-22 ENCOUNTER — OFFICE VISIT (OUTPATIENT)
Dept: OBSTETRICS AND GYNECOLOGY | Facility: CLINIC | Age: 23
End: 2023-12-22
Payer: COMMERCIAL

## 2023-12-22 VITALS — SYSTOLIC BLOOD PRESSURE: 128 MMHG | DIASTOLIC BLOOD PRESSURE: 70 MMHG | HEIGHT: 64 IN | BODY MASS INDEX: 28.32 KG/M2

## 2023-12-22 DIAGNOSIS — Z32.00 POSSIBLE PREGNANCY: Primary | ICD-10-CM

## 2023-12-22 LAB
B-HCG UR QL: POSITIVE
CTP QC/QA: YES

## 2023-12-22 PROCEDURE — 87086 URINE CULTURE/COLONY COUNT: CPT | Performed by: OBSTETRICS & GYNECOLOGY

## 2023-12-22 PROCEDURE — 81025 URINE PREGNANCY TEST: CPT | Mod: S$GLB,,, | Performed by: OBSTETRICS & GYNECOLOGY

## 2023-12-22 PROCEDURE — 99213 PR OFFICE/OUTPT VISIT, EST, LEVL III, 20-29 MIN: ICD-10-PCS | Mod: 25,S$GLB,, | Performed by: OBSTETRICS & GYNECOLOGY

## 2023-12-22 PROCEDURE — 81025 POCT URINE PREGNANCY: ICD-10-PCS | Mod: S$GLB,,, | Performed by: OBSTETRICS & GYNECOLOGY

## 2023-12-22 PROCEDURE — 3078F DIAST BP <80 MM HG: CPT | Mod: CPTII,S$GLB,, | Performed by: OBSTETRICS & GYNECOLOGY

## 2023-12-22 PROCEDURE — 76817 TRANSVAGINAL US OBSTETRIC: CPT | Mod: S$GLB,,, | Performed by: OBSTETRICS & GYNECOLOGY

## 2023-12-22 PROCEDURE — 1159F PR MEDICATION LIST DOCUMENTED IN MEDICAL RECORD: ICD-10-PCS | Mod: CPTII,S$GLB,, | Performed by: OBSTETRICS & GYNECOLOGY

## 2023-12-22 PROCEDURE — 3008F BODY MASS INDEX DOCD: CPT | Mod: CPTII,S$GLB,, | Performed by: OBSTETRICS & GYNECOLOGY

## 2023-12-22 PROCEDURE — 87491 CHLMYD TRACH DNA AMP PROBE: CPT | Performed by: OBSTETRICS & GYNECOLOGY

## 2023-12-22 PROCEDURE — 3074F PR MOST RECENT SYSTOLIC BLOOD PRESSURE < 130 MM HG: ICD-10-PCS | Mod: CPTII,S$GLB,, | Performed by: OBSTETRICS & GYNECOLOGY

## 2023-12-22 PROCEDURE — 3078F PR MOST RECENT DIASTOLIC BLOOD PRESSURE < 80 MM HG: ICD-10-PCS | Mod: CPTII,S$GLB,, | Performed by: OBSTETRICS & GYNECOLOGY

## 2023-12-22 PROCEDURE — 99999 PR PBB SHADOW E&M-EST. PATIENT-LVL III: ICD-10-PCS | Mod: PBBFAC,,, | Performed by: OBSTETRICS & GYNECOLOGY

## 2023-12-22 PROCEDURE — 3008F PR BODY MASS INDEX (BMI) DOCUMENTED: ICD-10-PCS | Mod: CPTII,S$GLB,, | Performed by: OBSTETRICS & GYNECOLOGY

## 2023-12-22 PROCEDURE — 3074F SYST BP LT 130 MM HG: CPT | Mod: CPTII,S$GLB,, | Performed by: OBSTETRICS & GYNECOLOGY

## 2023-12-22 PROCEDURE — 76817 PR US, OB, TRANSVAG APPROACH: ICD-10-PCS | Mod: S$GLB,,, | Performed by: OBSTETRICS & GYNECOLOGY

## 2023-12-22 PROCEDURE — 1159F MED LIST DOCD IN RCRD: CPT | Mod: CPTII,S$GLB,, | Performed by: OBSTETRICS & GYNECOLOGY

## 2023-12-22 PROCEDURE — 99213 OFFICE O/P EST LOW 20 MIN: CPT | Mod: 25,S$GLB,, | Performed by: OBSTETRICS & GYNECOLOGY

## 2023-12-22 PROCEDURE — 99999 PR PBB SHADOW E&M-EST. PATIENT-LVL III: CPT | Mod: PBBFAC,,, | Performed by: OBSTETRICS & GYNECOLOGY

## 2023-12-22 NOTE — PROCEDURES
Procedures    ULTRASOUND:   Bedside Ultrasound Findings    EXAMINATION:  US PELVIS COMP WITH TRANSVAG OB    CLINICAL HISTORY:  LMP=10/28/23, c/w 7w6d gestation    TECHNIQUE:  Transvaginal sonography was performed to better evaluate the uterus and ovaries.    COMPARISON:  None.    FINDINGS:  1. Uterus:    Appearance: Viable lawler intrauterine pregnancy was seen, yolk sac was seen. Crown-rump length = 11 mm with flicker, consistent with 7w1d and EDC 8/8/2024.    Size: Normal    Masses: None    Endometrium: Normal    2. Right ovary: Not seen    3. Left ovary: Not seen    Free Fluid:none    Adnexal pathology: None seen        Impression      1. Single viable intrauterine pregnancy   2. Crown rump length consistent with  7w1d, and estimated due date8/8/2024

## 2023-12-22 NOTE — PROGRESS NOTES
History of Present Illness   23 y.o.  female patient presents today for missed menses, A1  LMP:10/28/2023, counseled  15 minutes spent with patient with > 1/2 time in counseling.     Past medical and surgical history reviewed.   I have reviewed the patient's medical history in detail and updated the computerized patient record.      Please let me know if you have any questions.    Review of patient's allergies indicates:   Allergen Reactions    Cefzil [cefprozil] Hives    Rocephin [ceftriaxone] Hives         Past Medical History:   Diagnosis Date    Acne     Anxiety     Asthma     Depression     Seasonal allergic rhinitis 2018       Past Surgical History:   Procedure Laterality Date    TONSILLECTOMY         MEDS:   Current Outpatient Medications on File Prior to Visit   Medication Sig Dispense Refill    ascorbic acid, vitamin C, (VITAMIN C) 1000 MG tablet Take 1,000 mg by mouth once daily.      b complex vitamins capsule Take 1 capsule by mouth once daily.      ergocalciferol (ERGOCALCIFEROL) 50,000 unit Cap Take 50,000 Units by mouth every 7 days.      FLAXSEED ORAL Take by mouth.      Lactobacillus acidophilus (PROBIOTIC ACIDOPHILUS ORAL) Take by mouth once daily.      zinc gluconate 50 mg tablet Take 50 mg by mouth once daily.      budesonide-formoterol 160-4.5 mcg (SYMBICORT) 160-4.5 mcg/actuation HFAA Inhale 2 puffs into the lungs every 12 (twelve) hours. Controller (Patient not taking: Reported on 10/10/2023) 10.2 g 2    loratadine (CLARITIN) 10 mg tablet Take 1 tablet (10 mg total) by mouth once daily. (Patient not taking: Reported on 2023)  0    sertraline (ZOLOFT) 25 MG tablet Take 1 tablet (25 mg total) by mouth once daily. (Patient not taking: Reported on 2023) 30 tablet 1    [DISCONTINUED] azithromycin (Z-IBETH) 250 MG tablet Follow instructions on pack. (Patient not taking: Reported on 10/10/2023) 6 tablet 0     No current facility-administered medications on file prior to  "visit.       OB History          1    Para        Term                AB        Living             SAB        IAB        Ectopic        Multiple        Live Births                     Social History     Socioeconomic History    Marital status: Single   Occupational History    Occupation: college student    Tobacco Use    Smoking status: Never    Smokeless tobacco: Never   Substance and Sexual Activity    Alcohol use: Yes     Comment: Occasionally. Maybe once a month or every few months    Drug use: No    Sexual activity: Not Currently     Partners: Male     Birth control/protection: Abstinence   Other Topics Concern    Are you pregnant or think you may be? No    Breast-feeding No       Family History   Problem Relation Age of Onset    Glaucoma Paternal Grandmother     Diabetes Maternal Grandmother     Acne Father     Acne Mother     Cataracts Neg Hx     Macular degeneration Neg Hx     Retinal detachment Neg Hx     Uterine cancer Neg Hx     Breast cancer Neg Hx     Ovarian cancer Neg Hx     Cervical cancer Neg Hx          Review of Systems - neg  GEN ROS: negative for - chills or fever  Breast ROS: negative for breast lumps  Genito-Urinary ROS: no dysuria, trouble voiding, or hematuria     Urine pregnancy test in office: POSITIVE    Physical Examination:  /70   Ht 5' 4" (1.626 m)   LMP 10/28/2023 (Exact Date)   BMI 28.32 kg/m²    def      Assessment:  Early IUP  1. Possible pregnancy  POCT Urine Pregnancy    C. trachomatis/N. gonorrhoeae by AMP DNA [facility]; Urine    Urine culture    US OB/GYN In Clinic Procedure (Non [facility])- Today          Plan:  Bleeding/pain precautions   ultrasound w MD today, labs at follow up  Prenatal vitamins  requesting or summarizing old records (information regarding previous ob history)  Patient informed will be contacted with results within 2 weeks. Encouraged to please call back or email if she has not heard from us by then.        "

## 2023-12-23 LAB — BACTERIA UR CULT: NO GROWTH

## 2023-12-24 LAB
C TRACH DNA SPEC QL NAA+PROBE: NOT DETECTED
N GONORRHOEA DNA SPEC QL NAA+PROBE: NOT DETECTED

## 2024-01-12 ENCOUNTER — ROUTINE PRENATAL (OUTPATIENT)
Dept: OBSTETRICS AND GYNECOLOGY | Facility: CLINIC | Age: 24
End: 2024-01-12
Payer: COMMERCIAL

## 2024-01-12 ENCOUNTER — OFFICE VISIT (OUTPATIENT)
Dept: PSYCHIATRY | Facility: CLINIC | Age: 24
End: 2024-01-12
Payer: COMMERCIAL

## 2024-01-12 VITALS
DIASTOLIC BLOOD PRESSURE: 78 MMHG | BODY MASS INDEX: 28.85 KG/M2 | HEART RATE: 86 BPM | WEIGHT: 169 LBS | HEIGHT: 64 IN | SYSTOLIC BLOOD PRESSURE: 121 MMHG

## 2024-01-12 VITALS
SYSTOLIC BLOOD PRESSURE: 130 MMHG | BODY MASS INDEX: 28.95 KG/M2 | DIASTOLIC BLOOD PRESSURE: 71 MMHG | WEIGHT: 168.63 LBS

## 2024-01-12 DIAGNOSIS — Z3A.10 10 WEEKS GESTATION OF PREGNANCY: Primary | ICD-10-CM

## 2024-01-12 DIAGNOSIS — F43.23 ADJUSTMENT DISORDER WITH MIXED ANXIETY AND DEPRESSED MOOD: ICD-10-CM

## 2024-01-12 DIAGNOSIS — F41.1 GAD (GENERALIZED ANXIETY DISORDER): Primary | ICD-10-CM

## 2024-01-12 DIAGNOSIS — F90.2 ATTENTION DEFICIT HYPERACTIVITY DISORDER (ADHD), COMBINED TYPE: ICD-10-CM

## 2024-01-12 PROCEDURE — 3078F DIAST BP <80 MM HG: CPT | Mod: CPTII,S$GLB,, | Performed by: PHYSICIAN ASSISTANT

## 2024-01-12 PROCEDURE — 99999 PR PBB SHADOW E&M-EST. PATIENT-LVL III: CPT | Mod: PBBFAC,,, | Performed by: OBSTETRICS & GYNECOLOGY

## 2024-01-12 PROCEDURE — 99999 PR PBB SHADOW E&M-EST. PATIENT-LVL IV: CPT | Mod: PBBFAC,,, | Performed by: PHYSICIAN ASSISTANT

## 2024-01-12 PROCEDURE — 3008F BODY MASS INDEX DOCD: CPT | Mod: CPTII,S$GLB,, | Performed by: PHYSICIAN ASSISTANT

## 2024-01-12 PROCEDURE — 3074F SYST BP LT 130 MM HG: CPT | Mod: CPTII,S$GLB,, | Performed by: PHYSICIAN ASSISTANT

## 2024-01-12 PROCEDURE — 1159F MED LIST DOCD IN RCRD: CPT | Mod: CPTII,S$GLB,, | Performed by: PHYSICIAN ASSISTANT

## 2024-01-12 PROCEDURE — 99214 OFFICE O/P EST MOD 30 MIN: CPT | Mod: S$GLB,,, | Performed by: PHYSICIAN ASSISTANT

## 2024-01-12 PROCEDURE — 0502F SUBSEQUENT PRENATAL CARE: CPT | Mod: CPTII,S$GLB,, | Performed by: OBSTETRICS & GYNECOLOGY

## 2024-01-12 PROCEDURE — 1160F RVW MEDS BY RX/DR IN RCRD: CPT | Mod: CPTII,S$GLB,, | Performed by: PHYSICIAN ASSISTANT

## 2024-01-12 NOTE — PATIENT INSTRUCTIONS
Please go to emergency department if feeling as though you are a harm to yourself or others or if you are in crisis. Please call the clinic to report any worsening of symptoms or problems associated with medication.    Continue sertraline 25mg once daily for mood/anxiety.

## 2024-01-12 NOTE — PROGRESS NOTES
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

## 2024-01-12 NOTE — PROGRESS NOTES
Outpatient Psychiatry Follow-Up Visit (MD/NP)    1/12/2024    Clinical Status of Patient:  Outpatient (Ambulatory)    Chief Complaint:  Almas Colon is a 23 y.o. female who presents today for follow-up of depression, anxiety and attention problems.  Met with patient.     Interval History and Content of Current Session:  Interim Events/Subjective Report/Content of Current Session:  Almas is seen today for medication follow-up.  She just had a visit with her OBGYN.  Due date is August 3rd of this year.  Reports that OBGYN visits have been reassuring.  Feels that nausea has improved.  She has started sertraline about two weeks ago and feels that it is helpful for mood and anxiety.  Enjoyed the holidays.  She has been trying to be as active as possible but overall had been feeling very fatigued.  Still able to get through the work days.  Her boyfriend and father of the child is supportive.  She has a good relationship with his family as well.  She would like to continue sertraline at this time.  She denies suicidal or homicidal thoughts.  No other complaints today.    FROM PREVIOUS HPI  Almas is seen virtually today for medication follow-up.  She recently found out that she was pregnant.  The father of the child is someone she had been seeing for a short period of time but she states that he is very supportive and is excited about the pregnancy.  She states that her sister and her mom are also very excited for the pregnancy.  She has not yet told other friends/family.  Feels that she is about six weeks pregnant at this time and stopped her medication two weeks ago.  We discussed stimulant implications during first trimester.  Not currently taking any psychiatric medication.  Has not yet established with OBGYN but has upcoming appointment.  She does feel that she needs something for mood/anxiety.  Did well with sertraline previously but experienced weight gain.  She would like to resume sertraline at this  time.  We will have close follow-up with her due to pregnancy.  She denies suicidal or homicidal ideation.  No other complaints today.        1/12/2024     8:11 AM 12/15/2023    11:21 AM 9/26/2023    12:29 PM   GAD7   1. Feeling nervous, anxious, or on edge? 0 1 0   2. Not being able to stop or control worrying? 0 1 0   3. Worrying too much about different things? 0 0 0   4. Trouble relaxing? 0 0 0   5. Being so restless that it is hard to sit still? 0 0 0   6. Becoming easily annoyed or irritable? 0 1 0   7. Feeling afraid as if something awful might happen? 0 0 0   8. If you checked off any problems, how difficult have these problems made it for you to do your work, take care of things at home, or get along with other people? 0 1 0   MATHEW-7 Score 0 3 0     PHQ9: 2, denies SI    Review of Systems   PSYCHIATRIC: Pertinant items are noted in the narrative.  RESPIRATORY: No shortness of breath.  CARDIOVASCULAR: No tachycardia or chest pain.  GASTROINTESTINAL: No nausea, vomiting, pain, constipation or diarrhea.    Past Medical, Family and Social History: The patient's past medical, family and social history have been reviewed and updated as appropriate within the electronic medical record - see encounter notes.    Compliance: yes    Side effects: none    Risk Parameters:  Patient reports no suicidal ideation  Patient reports no homicidal ideation  Patient reports no self-injurious behavior  Patient reports no violent behavior    Exam (detailed: at least 9 elements; comprehensive: all 15 elements)   Constitutional  Vitals:  Most recent vital signs, dated less than 90 days prior to this appointment, were reviewed.   Vitals:    01/12/24 1115   BP: 121/78   Pulse: 86        General:  unremarkable, age appropriate, casually dressed     Musculoskeletal  Muscle Strength/Tone:  no dyskinesia   Gait & Station:  non-ataxic     Psychiatric  Speech:  no latency; no press   Mood & Affect:  good  congruent and appropriate   Thought  Process:  normal and logical   Associations:  intact   Thought Content:  normal, no suicidality, no homicidality, delusions, or paranoia   Insight:  intact   Judgement: behavior is adequate to circumstances   Orientation:  grossly intact   Memory: intact for content of interview   Language: grossly intact   Attention Span & Concentration:  able to focus   Fund of Knowledge:  intact and appropriate to age and level of education     Assessment and Diagnosis   Status/Progress: Based on the examination today, the patient's problem(s) is/are improved.  New problems have not been presented today.   Co-morbidities, Diagnostic uncertainty and Lack of compliance are not complicating management of the primary condition.     General Impression:   Adjustment disorder  ADHD, combined presentation type  Recent confirmed pregnancy    Intervention/Counseling/Treatment Plan   Medication Management: Continue current medications. The risks and benefits of medication were discussed with the patient.  Counseling provided with patient as follows: importance of compliance with chosen treatment options was emphasized, risks and benefits of treatment options, including medications, were discussed with the patient, risk factor reduction, prognosis    Almas is seen today for medication follow up.  Recently found out about unintended pregnancy but she is excited about this.  Discontinued Vyvanse and buspirone as soon as she found out she was pregnant.  We discussed first trimester implications with these medications and she is understanding.  Based on assessment:    Continue sertraline 25 mg once daily for mood/anxiety. Will have very close f/u with this patient.   Therapy referral placed.     Please go to emergency department if feeling as though you are a harm to yourself or others or if you are in crisis.     Please call the clinic to report any worsening of symptoms or problems associated with medication.    This medication has US boxed  warning that states there may be an increased risk of suicidal thoughts and behavior in pediatric and young adult patients in short-term studies. Closely monitor all antidepressant-treated patients for clinical worsening, and for emergence of suicidal thoughts and behaviors.    Return to Clinic: 1 month, as needed

## 2024-01-19 ENCOUNTER — LAB VISIT (OUTPATIENT)
Dept: LAB | Facility: HOSPITAL | Age: 24
End: 2024-01-19
Attending: OBSTETRICS & GYNECOLOGY
Payer: COMMERCIAL

## 2024-01-19 DIAGNOSIS — Z3A.10 10 WEEKS GESTATION OF PREGNANCY: ICD-10-CM

## 2024-01-19 LAB — HCV AB SERPL QL IA: NORMAL

## 2024-01-19 PROCEDURE — 84443 ASSAY THYROID STIM HORMONE: CPT | Performed by: OBSTETRICS & GYNECOLOGY

## 2024-01-19 PROCEDURE — 87389 HIV-1 AG W/HIV-1&-2 AB AG IA: CPT | Performed by: OBSTETRICS & GYNECOLOGY

## 2024-01-19 PROCEDURE — 87340 HEPATITIS B SURFACE AG IA: CPT | Performed by: OBSTETRICS & GYNECOLOGY

## 2024-01-19 PROCEDURE — 86592 SYPHILIS TEST NON-TREP QUAL: CPT | Performed by: OBSTETRICS & GYNECOLOGY

## 2024-01-19 PROCEDURE — 86901 BLOOD TYPING SEROLOGIC RH(D): CPT | Performed by: OBSTETRICS & GYNECOLOGY

## 2024-01-19 PROCEDURE — 86762 RUBELLA ANTIBODY: CPT | Performed by: OBSTETRICS & GYNECOLOGY

## 2024-01-19 PROCEDURE — 85025 COMPLETE CBC W/AUTO DIFF WBC: CPT | Performed by: OBSTETRICS & GYNECOLOGY

## 2024-01-19 PROCEDURE — 36415 COLL VENOUS BLD VENIPUNCTURE: CPT | Mod: PN | Performed by: OBSTETRICS & GYNECOLOGY

## 2024-01-19 PROCEDURE — 86803 HEPATITIS C AB TEST: CPT | Performed by: OBSTETRICS & GYNECOLOGY

## 2024-01-20 LAB
ABO + RH BLD: NORMAL
BASOPHILS # BLD AUTO: 0.04 K/UL (ref 0–0.2)
BASOPHILS NFR BLD: 0.3 % (ref 0–1.9)
BLD GP AB SCN CELLS X3 SERPL QL: NORMAL
DIFFERENTIAL METHOD BLD: ABNORMAL
EOSINOPHIL # BLD AUTO: 0.1 K/UL (ref 0–0.5)
EOSINOPHIL NFR BLD: 0.8 % (ref 0–8)
ERYTHROCYTE [DISTWIDTH] IN BLOOD BY AUTOMATED COUNT: 12.3 % (ref 11.5–14.5)
HBV SURFACE AG SERPL QL IA: NORMAL
HCT VFR BLD AUTO: 38.2 % (ref 37–48.5)
HGB BLD-MCNC: 13.2 G/DL (ref 12–16)
HIV 1+2 AB+HIV1 P24 AG SERPL QL IA: NORMAL
IMM GRANULOCYTES # BLD AUTO: 0.15 K/UL (ref 0–0.04)
IMM GRANULOCYTES NFR BLD AUTO: 1.3 % (ref 0–0.5)
LYMPHOCYTES # BLD AUTO: 1.4 K/UL (ref 1–4.8)
LYMPHOCYTES NFR BLD: 12 % (ref 18–48)
MCH RBC QN AUTO: 31.7 PG (ref 27–31)
MCHC RBC AUTO-ENTMCNC: 34.6 G/DL (ref 32–36)
MCV RBC AUTO: 92 FL (ref 82–98)
MONOCYTES # BLD AUTO: 0.6 K/UL (ref 0.3–1)
MONOCYTES NFR BLD: 5.1 % (ref 4–15)
NEUTROPHILS # BLD AUTO: 9.4 K/UL (ref 1.8–7.7)
NEUTROPHILS NFR BLD: 80.5 % (ref 38–73)
NRBC BLD-RTO: 0 /100 WBC
PLATELET # BLD AUTO: 267 K/UL (ref 150–450)
PMV BLD AUTO: 10.4 FL (ref 9.2–12.9)
RBC # BLD AUTO: 4.17 M/UL (ref 4–5.4)
RPR SER QL: NORMAL
TSH SERPL DL<=0.005 MIU/L-ACNC: 1.01 UIU/ML (ref 0.4–4)
WBC # BLD AUTO: 11.69 K/UL (ref 3.9–12.7)

## 2024-01-22 LAB
RUBV IGG SER-ACNC: 18.8 IU/ML
RUBV IGG SER-IMP: REACTIVE

## 2024-01-23 ENCOUNTER — PATIENT MESSAGE (OUTPATIENT)
Dept: OBSTETRICS AND GYNECOLOGY | Facility: CLINIC | Age: 24
End: 2024-01-23
Payer: COMMERCIAL

## 2024-01-25 ENCOUNTER — TELEPHONE (OUTPATIENT)
Dept: PSYCHIATRY | Facility: CLINIC | Age: 24
End: 2024-01-25
Payer: COMMERCIAL

## 2024-01-25 NOTE — TELEPHONE ENCOUNTER
Called patient to schedule a new patient talk therapy appointment from the referral sent by OTTONIEL Mathis. No answer, left voice message, sent my chart message

## 2024-01-29 NOTE — TELEPHONE ENCOUNTER
Called pt regarding below message. Scheduled a new patient appointment for 02/09/2024 @ 2pm with Cecil Blakely LPC. Advised patient of location, contact number, to arrive 15 minutes early, to bring id and insurance card, and of the cancellation policy. Patient states understanding with no additional questions.

## 2024-02-05 ENCOUNTER — TELEPHONE (OUTPATIENT)
Dept: OBSTETRICS AND GYNECOLOGY | Facility: CLINIC | Age: 24
End: 2024-02-05
Payer: COMMERCIAL

## 2024-02-05 NOTE — TELEPHONE ENCOUNTER
----- Message from Cristyej Baez sent at 2/5/2024  4:33 PM CST -----  Regarding: Returning phone call  Contact: pt  Type:  Patient Returning Call    Who Called:pt    Who Left Message for Patient: Carolyne    Does the patient know what this is regarding?: genetic testing    Would the patient rather a call back or a response via [facility]? Call back    Best Call Back Number:[phone]    Additional Information:  pt returning a call.   Please advise.  Thank you.

## 2024-02-09 ENCOUNTER — ROUTINE PRENATAL (OUTPATIENT)
Dept: OBSTETRICS AND GYNECOLOGY | Facility: CLINIC | Age: 24
End: 2024-02-09
Payer: COMMERCIAL

## 2024-02-09 VITALS
DIASTOLIC BLOOD PRESSURE: 70 MMHG | SYSTOLIC BLOOD PRESSURE: 124 MMHG | BODY MASS INDEX: 30.16 KG/M2 | WEIGHT: 175.69 LBS

## 2024-02-09 DIAGNOSIS — Z3A.14 14 WEEKS GESTATION OF PREGNANCY: Primary | ICD-10-CM

## 2024-02-09 LAB
BILIRUBIN, UA POC OHS: NEGATIVE
BLOOD, UA POC OHS: NEGATIVE
CLARITY, UA POC OHS: CLEAR
COLOR, UA POC OHS: YELLOW
GLUCOSE, UA POC OHS: NEGATIVE
KETONES, UA POC OHS: NEGATIVE
LEUKOCYTES, UA POC OHS: ABNORMAL
NITRITE, UA POC OHS: NEGATIVE
PH, UA POC OHS: 6.5
PROTEIN, UA POC OHS: NEGATIVE
SPECIFIC GRAVITY, UA POC OHS: 1.01
UROBILINOGEN, UA POC OHS: 0.2

## 2024-02-09 PROCEDURE — 0502F SUBSEQUENT PRENATAL CARE: CPT | Mod: CPTII,S$GLB,, | Performed by: OBSTETRICS & GYNECOLOGY

## 2024-02-09 PROCEDURE — 99999 PR PBB SHADOW E&M-EST. PATIENT-LVL III: CPT | Mod: PBBFAC,,, | Performed by: OBSTETRICS & GYNECOLOGY

## 2024-02-09 NOTE — PROGRESS NOTES
The patient presents with No complaints.   Reports:  No Bleeding or pains    2024  23 y.o. 14w1d Estimated Date of Delivery: 24, dating reviewed.   OB History    Para Term  AB Living   1             SAB IAB Ectopic Multiple Live Births                  # Outcome Date GA Lbr Hugo/2nd Weight Sex Delivery Anes PTL Lv   1 Current                Prenatal labs reviewed and updated today    Review of Systems:  General ROS: negative for headache or visual changes  Breast ROS: negative for breast lumps  Gastrointestinal ROS: negative for  constipation, diarrhea or nausea/vomiting  Musculoskeletal ROS: negative for pain in joints or swelling in face or hands.   Neurological ROS: negative for - headaches, numbness/tingling or visual changes      Physical Exam:  /70   Wt 79.7 kg (175 lb 11.3 oz)   LMP 10/28/2023 (Exact Date)   BMI 30.16 kg/m²   Urine Dip: Pending  Fundal Height:14cm  Fetal Heart Tones: 154bpm  Constitutional: She is oriented to person, place, and time. She appears well-developed and well-nourished. No distress. Normal weight   Cardiovascular: Normal rate.    Pulmonary/Chest: Effort normal. No respiratory distress  Abdominal: Soft, gravid, nontender. No rebound and no guarding.     Genitourinary: Deferred    Musculoskeletal: Normal range of motion, Minimal peripheral edema.   Neurological: She is alert and oriented to person, place, and time. Coordination normal.   Skin: Skin is warm and dry. She is not diaphoretic.  Psychiatric: She has a normal mood and affect.        Assessment:  23 y.o., at 14w1d Gestation   Patient Active Problem List   Diagnosis    Seasonal allergic rhinitis     Current Outpatient Medications on File Prior to Visit   Medication Sig Dispense Refill    prenatal vit no.124/iron/folic (PRENATAL VITAMIN ORAL) Take by mouth.      sertraline (ZOLOFT) 25 MG tablet Take 1 tablet (25 mg total) by mouth once daily. 30 tablet 1    ascorbic acid, vitamin C, (VITAMIN C)  1000 MG tablet Take 1,000 mg by mouth once daily.      b complex vitamins capsule Take 1 capsule by mouth once daily.      budesonide-formoterol 160-4.5 mcg (SYMBICORT) 160-4.5 mcg/actuation HFAA Inhale 2 puffs into the lungs every 12 (twelve) hours. Controller (Patient not taking: Reported on 2/9/2024) 10.2 g 2    ergocalciferol (ERGOCALCIFEROL) 50,000 unit Cap Take 50,000 Units by mouth every 7 days.      FLAXSEED ORAL Take by mouth.      Lactobacillus acidophilus (PROBIOTIC ACIDOPHILUS ORAL) Take by mouth once daily.      loratadine (CLARITIN) 10 mg tablet Take 1 tablet (10 mg total) by mouth once daily.  0    zinc gluconate 50 mg tablet Take 50 mg by mouth once daily.       No current facility-administered medications on file prior to visit.         Plan:   Labs today: none  Orders today: none  MEds today: none  Procedures Today: none  Follow up 3 Weeks, bleeding/pain precautions

## 2024-02-16 ENCOUNTER — OFFICE VISIT (OUTPATIENT)
Dept: PSYCHIATRY | Facility: CLINIC | Age: 24
End: 2024-02-16
Payer: COMMERCIAL

## 2024-02-16 DIAGNOSIS — F41.1 GAD (GENERALIZED ANXIETY DISORDER): ICD-10-CM

## 2024-02-16 DIAGNOSIS — F90.2 ATTENTION DEFICIT HYPERACTIVITY DISORDER (ADHD), COMBINED TYPE: ICD-10-CM

## 2024-02-16 DIAGNOSIS — F43.23 ADJUSTMENT DISORDER WITH MIXED ANXIETY AND DEPRESSED MOOD: Primary | ICD-10-CM

## 2024-02-16 PROCEDURE — 99214 OFFICE O/P EST MOD 30 MIN: CPT | Mod: 95,,, | Performed by: PHYSICIAN ASSISTANT

## 2024-02-16 PROCEDURE — G2211 COMPLEX E/M VISIT ADD ON: HCPCS | Mod: 95,,, | Performed by: PHYSICIAN ASSISTANT

## 2024-02-16 PROCEDURE — 1160F RVW MEDS BY RX/DR IN RCRD: CPT | Mod: CPTII,95,, | Performed by: PHYSICIAN ASSISTANT

## 2024-02-16 PROCEDURE — 1159F MED LIST DOCD IN RCRD: CPT | Mod: CPTII,95,, | Performed by: PHYSICIAN ASSISTANT

## 2024-02-16 RX ORDER — SERTRALINE HYDROCHLORIDE 25 MG/1
25 TABLET, FILM COATED ORAL DAILY
Qty: 30 TABLET | Refills: 2 | Status: SHIPPED | OUTPATIENT
Start: 2024-02-16 | End: 2024-04-01 | Stop reason: SDUPTHER

## 2024-02-16 NOTE — PROGRESS NOTES
The patient location is: at work in [place], [place]  The chief complaint leading to consultation is: dep/anxiety    Visit type: audiovisual    Face to Face time with patient: 15  30 minutes of total time spent on the encounter, which includes face to face time and non-face to face time preparing to see the patient (eg, review of tests), Obtaining and/or reviewing separately obtained history, Documenting clinical information in the electronic or other health record, Independently interpreting results (not separately reported) and communicating results to the patient/family/caregiver, or Care coordination (not separately reported).     Each patient to whom he or she provides medical services by telemedicine is:  (1) informed of the relationship between the physician and patient and the respective role of any other health care provider with respect to management of the patient; and (2) notified that he or she may decline to receive medical services by telemedicine and may withdraw from such care at any time.    Outpatient Psychiatry Follow-Up Visit (MD/NP)    2/16/2024    Clinical Status of Patient:  Outpatient (Ambulatory)    Chief Complaint:  Almas Chowdhury Isaiah is a 23 y.o. female who presents today for follow-up of depression, anxiety and attention problems.  Met with patient.     Interval History and Content of Current Session:  Interim Events/Subjective Report/Content of Current Session:  Almas is seen today for medication follow-up.  She is now in her second trimester of pregnancy.  They are waiting to do a gender reveal and she is looking forward to this.  Plans to have the party at the end of March.  She overall is feeling better, feels that nausea and energy levels are improving.  She feels that sertraline has been significantly helping for mood/anxiety.  She is interested in therapy referral and we will order this today.  She denies suicidal or homicidal ideation.  No concerning hallucinations or  paranoia reported.  No other complaints today.    FROM PREVIOUS HPI  Almas is seen today for medication follow-up.  She just had a visit with her OBGYN.  Due date is August 3rd of this year.  Reports that OBGYN visits have been reassuring.  Feels that nausea has improved.  She has started sertraline about two weeks ago and feels that it is helpful for mood and anxiety.  Enjoyed the holidays.  She has been trying to be as active as possible but overall had been feeling very fatigued.  Still able to get through the work days.  Her boyfriend and father of the child is supportive.  She has a good relationship with his family as well.  She would like to continue sertraline at this time.  She denies suicidal or homicidal thoughts.  No other complaints today.          2/16/2024    10:01 AM 1/12/2024     8:11 AM 12/15/2023    11:21 AM   GAD7   1. Feeling nervous, anxious, or on edge? 0 0 1   2. Not being able to stop or control worrying? 0 0 1   3. Worrying too much about different things? 0 0 0   4. Trouble relaxing? 0 0 0   5. Being so restless that it is hard to sit still? 0 0 0   6. Becoming easily annoyed or irritable? 0 0 1   7. Feeling afraid as if something awful might happen? 0 0 0   8. If you checked off any problems, how difficult have these problems made it for you to do your work, take care of things at home, or get along with other people?  0 1   MATHEW-7 Score 0 0 3     PHQ9: 1, denies SI    Outpatient Psychiatry Initial Visit (MD/NP)     1/28/2021     Almas Colon, a 20 y.o. female, presenting for initial evaluation visit. Met with patient.     Reason for Encounter: Referral from Dina Thayer NP. Patient complains of inattention/concentration.     History of Present Illness:   This is a healthy 20-year-old female, past medical history of well-controlled asthma, who presents for initial evaluation today.  She reports that she has been prescribed Vyvanse 20 mg daily for attention/concentration.  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  Has been on winter break so has not been taking the Vyvanse.  She does report that it has helped her mood, attention, focus, complete schoolwork as well as daily tasks.  She is considering antidepressant therapy but is not sure if that is necessary at this point.  She does start school next week and will see how she is doing on Vyvanse 40 mg daily and go from there.  She is unsure if she wants to stay on stimulant long-term.  Discussed Strattera for nonstimulant treatment of ADHD.  She will consider this medication as well.  Denies suicidal or homicidal ideation.  Appetite and sleep are stable.  Has supportive family and friends.     She states her mood has been stable.  She is entering her senior year of college.  Was going to graduate May 2021 but is pushing that back to December 2021.  She is looking for engineering internships.  Program is challenging but she enjoys it.  Also established with Jennifer Jackson LCSW for psychotherapy.  Financial situation is a bit stressed.  Living with her mother.  Has been attempting to increase physical activity which does cause her to feel better.  Patient describes that she was on sertraline years ago and did well with that but it did cause her to have some questionable decision making.     Depression symptoms: patient reports little interest or pleasure in doing things; feeling down, depressed, or hopeless; trouble falling asleep or staying asleep, or sleeping too much; feeling tired or having little energy; poor appetite/overeating; trouble concentrating. Denies thoughts of self-harm or suicide.     Anxiety symptoms: reports feeling nervous, anxious, or on edge; not being able to stop or control worrying; worrying too much about different things; becoming easily annoyed or irritable.     Natalie/Hypomania symptoms: denies periods of time in which she has gone without sleep with elevated or irritable mood and impulsive  decision making.  Mood disorder questionnaire, she does admit to irritability, racing thoughts, distractibility at times more social or outgoing.     Psychosis: denies     Attention/Concentration:  Improved with Vyvanse     Body Image/Hx of eating disorders: denies     Suicidal ideation and risk: denies, no history of suicide attempts     Homicidal/Violient ideation and risk: denies     Sleep: not sleeping that well, trying to get back into sleep schedule, used to take melatonin      Appetite:  Adequate     GAD7:  Five, somewhat difficult  PHQ9:  13, extremely difficult  MDQ:  Negative screen     Past Psychiatric History:  Prior diagnoses: ADHD, depressive symptoms in the past     Inpatient psychiatric treatment:  No history of inpatient or intensive outpatient     Outpatient psychiatric treatment:  Primary care provider     Prior medications: zoloft, wellbutrin, vyvanse     Current medications: vyvanse     Prior suicide attempts:  None     Prior history self harm:  No history of self-injurious behavior     Prior psychotherapy:  Currently involved with Jennifer Jackson LCSW        Allergies:       Review of patient's allergies indicates:   Allergen Reactions    Ceftriaxone Hives    Cefzil [cefprozil] Hives         Past Medical History:       Past Medical History:   Diagnosis Date    Acne      Asthma      Depression      Kidney stone      Seasonal allergic rhinitis 5/30/2018   G0     History TBI: denies  History seizures: denies     Past Surgical History:        Past Surgical History:   Procedure Laterality Date    TONSILLECTOMY             Family History:   Suicide: denies  Substance use: denies  Bipolar disorder/Psychotic disorder: denies  Anxiety: mother  Depression: mother     Social History:  Childhood: born in [place], raised by biological mother and father. Living with mom now. Father is staying with her grandmother. Establishing a relationship with her father. Two half sisters and step-sister. Doing well.  Good relationship.  Marital status: supportive boyfriend  Children: none  Resides: [place]  Occupation: not working right now, going to try find part time job  Hobbies: trying to find a hobby  Yazdanism: none  Education level: senior in college  : denies  Legal: denies  History of abuse/trauma: a bit of emotional trauma, working through in therapy     Substance History:  Tobacco: denies  Alcohol: occasionally, rarely  Drug use: denies  Caffeine: cup a day of coffee      Rehab:  Prior/current AA: denies    Review of Systems   PSYCHIATRIC: Pertinant items are noted in the narrative.  RESPIRATORY: No shortness of breath.  CARDIOVASCULAR: No tachycardia or chest pain.  GASTROINTESTINAL: No nausea, vomiting, pain, constipation or diarrhea.    Past Medical, Family and Social History: The patient's past medical, family and social history have been reviewed and updated as appropriate within the electronic medical record - see encounter notes.    Compliance: yes    Side effects: none    Risk Parameters:  Patient reports no suicidal ideation  Patient reports no homicidal ideation  Patient reports no self-injurious behavior  Patient reports no violent behavior    Exam (detailed: at least 9 elements; comprehensive: all 15 elements)   Constitutional  Vitals:  Most recent vital signs, dated less than 90 days prior to this appointment, were reviewed.   There were no vitals filed for this visit.       General:  unremarkable, age appropriate, casually dressed     Musculoskeletal  Muscle Strength/Tone:  no dyskinesia   Gait & Station:  Virtual visit     Psychiatric  Speech:  no latency; no press   Mood & Affect:  good  congruent and appropriate   Thought Process:  normal and logical   Associations:  intact   Thought Content:  normal, no suicidality, no homicidality, delusions, or paranoia   Insight:  intact   Judgement: behavior is adequate to circumstances   Orientation:  grossly intact   Memory: intact for content of  interview   Language: grossly intact   Attention Span & Concentration:  able to focus   Fund of Knowledge:  intact and appropriate to age and level of education     Assessment and Diagnosis   Status/Progress: Based on the examination today, the patient's problem(s) is/are improved.  New problems have not been presented today.   Co-morbidities, Diagnostic uncertainty and Lack of compliance are not complicating management of the primary condition.     General Impression:   Adjustment disorder  ADHD, combined presentation type  Recent confirmed pregnancy    Intervention/Counseling/Treatment Plan   Medication Management: Continue current medications. The risks and benefits of medication were discussed with the patient.  Counseling provided with patient as follows: importance of compliance with chosen treatment options was emphasized, risks and benefits of treatment options, including medications, were discussed with the patient, risk factor reduction, prognosis    Almas is seen today for medication follow up.  Recently found out about unintended pregnancy but she is excited about this.  Discontinued Vyvanse and buspirone as soon as she found out she was pregnant.  We discussed first trimester implications with these medications and she is understanding.  Based on assessment:    Continue sertraline 25 mg once daily for mood/anxiety. Will have very close f/u with this patient.   Therapy referral placed.     Please go to emergency department if feeling as though you are a harm to yourself or others or if you are in crisis.     Please call the clinic to report any worsening of symptoms or problems associated with medication.    This medication has US boxed warning that states there may be an increased risk of suicidal thoughts and behavior in pediatric and young adult patients in short-term studies. Closely monitor all antidepressant-treated patients for clinical worsening, and for emergence of suicidal thoughts and  behaviors.    Return to Clinic: as scheduled, as needed

## 2024-02-19 ENCOUNTER — TELEPHONE (OUTPATIENT)
Dept: PSYCHIATRY | Facility: CLINIC | Age: 24
End: 2024-02-19
Payer: COMMERCIAL

## 2024-02-19 NOTE — TELEPHONE ENCOUNTER
Called patient to schedule a new patient talk therapy appointment from the referral sent by OTTONIEL Mathis. No answer, voice mail full, sent my chart message

## 2024-02-21 ENCOUNTER — PATIENT MESSAGE (OUTPATIENT)
Dept: OPTOMETRY | Facility: CLINIC | Age: 24
End: 2024-02-21
Payer: COMMERCIAL

## 2024-02-22 ENCOUNTER — PATIENT MESSAGE (OUTPATIENT)
Dept: OTHER | Facility: OTHER | Age: 24
End: 2024-02-22
Payer: COMMERCIAL

## 2024-02-29 ENCOUNTER — PATIENT MESSAGE (OUTPATIENT)
Dept: OTHER | Facility: OTHER | Age: 24
End: 2024-02-29
Payer: COMMERCIAL

## 2024-02-29 ENCOUNTER — TELEPHONE (OUTPATIENT)
Dept: PSYCHIATRY | Facility: CLINIC | Age: 24
End: 2024-02-29
Payer: COMMERCIAL

## 2024-02-29 NOTE — TELEPHONE ENCOUNTER
Spoke to patient regarding referral. She has not contacted [facility] yet for an appointment. Provided the patient with the [facility] phone number. Advised to call the office back if she is unable to schedule. Patient declined scheduling in the [facility] clinic due to working in [facility] and living in [place]. [facility] referral closed

## 2024-03-01 ENCOUNTER — ROUTINE PRENATAL (OUTPATIENT)
Dept: OBSTETRICS AND GYNECOLOGY | Facility: CLINIC | Age: 24
End: 2024-03-01
Payer: COMMERCIAL

## 2024-03-01 ENCOUNTER — OFFICE VISIT (OUTPATIENT)
Dept: OPTOMETRY | Facility: CLINIC | Age: 24
End: 2024-03-01
Payer: COMMERCIAL

## 2024-03-01 VITALS
WEIGHT: 183.19 LBS | BODY MASS INDEX: 31.45 KG/M2 | SYSTOLIC BLOOD PRESSURE: 124 MMHG | DIASTOLIC BLOOD PRESSURE: 72 MMHG

## 2024-03-01 DIAGNOSIS — H52.13 MYOPIA OF BOTH EYES: Primary | ICD-10-CM

## 2024-03-01 DIAGNOSIS — Z3A.17 17 WEEKS GESTATION OF PREGNANCY: Primary | ICD-10-CM

## 2024-03-01 DIAGNOSIS — H04.123 BILATERAL DRY EYES: ICD-10-CM

## 2024-03-01 LAB
BILIRUBIN, UA POC OHS: NEGATIVE
BLOOD, UA POC OHS: NEGATIVE
CLARITY, UA POC OHS: ABNORMAL
COLOR, UA POC OHS: YELLOW
GLUCOSE, UA POC OHS: NEGATIVE
KETONES, UA POC OHS: NEGATIVE
LEUKOCYTES, UA POC OHS: ABNORMAL
NITRITE, UA POC OHS: NEGATIVE
PH, UA POC OHS: 8
PROTEIN, UA POC OHS: NEGATIVE
SPECIFIC GRAVITY, UA POC OHS: 1.02
UROBILINOGEN, UA POC OHS: 0.2

## 2024-03-01 PROCEDURE — 92310 CONTACT LENS FITTING OU: CPT | Mod: CSM,S$GLB,, | Performed by: OPTOMETRIST

## 2024-03-01 PROCEDURE — 99213 OFFICE O/P EST LOW 20 MIN: CPT | Mod: ,,, | Performed by: OPTOMETRIST

## 2024-03-01 PROCEDURE — 0502F SUBSEQUENT PRENATAL CARE: CPT | Mod: CPTII,S$GLB,, | Performed by: OBSTETRICS & GYNECOLOGY

## 2024-03-01 PROCEDURE — 99999 PR PBB SHADOW E&M-EST. PATIENT-LVL III: CPT | Mod: PBBFAC,,, | Performed by: OBSTETRICS & GYNECOLOGY

## 2024-03-01 PROCEDURE — 99999 PR PBB SHADOW E&M-EST. PATIENT-LVL III: CPT | Mod: PBBFAC,,, | Performed by: OPTOMETRIST

## 2024-03-01 NOTE — PROGRESS NOTES
HPI     Concerns About Ocular Health     Additional comments: Ocular health exam with clfu           Comments    DLS: 1/6/23    Pt states having a little blurry va. Pt 18 wks pregnant.     Gtts: Systane drop QHS OU for dryness with relief          Last edited by Samm Vance, OD on 3/1/2024  3:45 PM.            Assessment /Plan     For exam results, see Encounter Report.    Myopia of both eyes    Bilateral dry eyes      Contact lens Rx released to pt. Daily wear only advised, with education to risks of extended wear.  Discussed lens care, compliance and solutions. RTC yearly contact lens follow up.   2. Use rewet drops and artificial tears for dryness daily

## 2024-03-01 NOTE — PROGRESS NOTES
The patient presents with No complaints.   Reports: No Bleeding or pains    3/1/2024  23 y.o. 17w1d Estimated Date of Delivery: 24, dating reviewed.   OB History    Para Term  AB Living   1             SAB IAB Ectopic Multiple Live Births                  # Outcome Date GA Lbr Hugo/2nd Weight Sex Delivery Anes PTL Lv   1 Current                Prenatal labs reviewed and updated today    Review of Systems:  General ROS: negative for headache or visual changes  Breast ROS: negative for breast lumps  Gastrointestinal ROS: negative for constipation, diarrhea or nausea/vomiting  Musculoskeletal ROS: negative for pain in joints or swelling in face or hands.   Neurological ROS: negative for - headaches, numbness/tingling or visual changes      Physical Exam:  LMP 10/28/2023 (Exact Date)   Urine Dip: Pending  Fundal Height:16cm  Fetal Heart Tones: 150bpm  Constitutional: She is oriented to person, place, and time. She appears well-developed and well-nourished. No distress. Normal weight   Cardiovascular: Normal rate.    Pulmonary/Chest: Effort normal. No respiratory distress  Abdominal: Soft, gravid, nontender. No rebound and no guarding.     Genitourinary: Deferred    Musculoskeletal: Normal range of motion, Minimal peripheral edema.   Neurological: She is alert and oriented to person, place, and time. Coordination normal.   Skin: Skin is warm and dry. She is not diaphoretic.  Psychiatric: She has a normal mood and affect.        Assessment:  23 y.o., at 17w1d Gestation   Patient Active Problem List   Diagnosis    Seasonal allergic rhinitis     Current Outpatient Medications on File Prior to Visit   Medication Sig Dispense Refill    ascorbic acid, vitamin C, (VITAMIN C) 1000 MG tablet Take 1,000 mg by mouth once daily.      b complex vitamins capsule Take 1 capsule by mouth once daily.      ergocalciferol (ERGOCALCIFEROL) 50,000 unit Cap Take 50,000 Units by mouth every 7 days.      FLAXSEED ORAL Take by  mouth.      Lactobacillus acidophilus (PROBIOTIC ACIDOPHILUS ORAL) Take by mouth once daily.      prenatal vit no.124/iron/folic (PRENATAL VITAMIN ORAL) Take by mouth.      sertraline (ZOLOFT) 25 MG tablet Take 1 tablet (25 mg total) by mouth once daily. 30 tablet 2    zinc gluconate 50 mg tablet Take 50 mg by mouth once daily.      budesonide-formoterol 160-4.5 mcg (SYMBICORT) 160-4.5 mcg/actuation HFAA Inhale 2 puffs into the lungs every 12 (twelve) hours. Controller (Patient not taking: Reported on 2/9/2024) 10.2 g 2    loratadine (CLARITIN) 10 mg tablet Take 1 tablet (10 mg total) by mouth once daily.  0     No current facility-administered medications on file prior to visit.         Plan:   Labs today: none  Orders today: u/s 3 weeks  MEds today: none  Procedures Today: none  Follow up 3 Weeks, bleeding/pain precautions ,  kick counts, labor precautions

## 2024-03-06 ENCOUNTER — PATIENT MESSAGE (OUTPATIENT)
Dept: OPTOMETRY | Facility: CLINIC | Age: 24
End: 2024-03-06
Payer: COMMERCIAL

## 2024-03-19 ENCOUNTER — PATIENT MESSAGE (OUTPATIENT)
Dept: PSYCHIATRY | Facility: CLINIC | Age: 24
End: 2024-03-19
Payer: COMMERCIAL

## 2024-03-19 ENCOUNTER — OFFICE VISIT (OUTPATIENT)
Dept: PSYCHIATRY | Facility: CLINIC | Age: 24
End: 2024-03-19
Payer: COMMERCIAL

## 2024-03-19 DIAGNOSIS — F41.1 GAD (GENERALIZED ANXIETY DISORDER): ICD-10-CM

## 2024-03-19 DIAGNOSIS — F90.2 ATTENTION DEFICIT HYPERACTIVITY DISORDER (ADHD), COMBINED TYPE: Primary | ICD-10-CM

## 2024-03-19 PROCEDURE — 99214 OFFICE O/P EST MOD 30 MIN: CPT | Mod: 95,,, | Performed by: PHYSICIAN ASSISTANT

## 2024-03-19 PROCEDURE — 1160F RVW MEDS BY RX/DR IN RCRD: CPT | Mod: CPTII,95,, | Performed by: PHYSICIAN ASSISTANT

## 2024-03-19 PROCEDURE — 1159F MED LIST DOCD IN RCRD: CPT | Mod: CPTII,95,, | Performed by: PHYSICIAN ASSISTANT

## 2024-03-19 PROCEDURE — G2211 COMPLEX E/M VISIT ADD ON: HCPCS | Mod: 95,,, | Performed by: PHYSICIAN ASSISTANT

## 2024-03-19 NOTE — PROGRESS NOTES
The patient location is: at work in [place], [place]  The chief complaint leading to consultation is: dep/anxiety    Visit type: audiovisual    Face to Face time with patient: 15  30 minutes of total time spent on the encounter, which includes face to face time and non-face to face time preparing to see the patient (eg, review of tests), Obtaining and/or reviewing separately obtained history, Documenting clinical information in the electronic or other health record, Independently interpreting results (not separately reported) and communicating results to the patient/family/caregiver, or Care coordination (not separately reported).     Each patient to whom he or she provides medical services by telemedicine is:  (1) informed of the relationship between the physician and patient and the respective role of any other health care provider with respect to management of the patient; and (2) notified that he or she may decline to receive medical services by telemedicine and may withdraw from such care at any time.    Outpatient Psychiatry Follow-Up Visit (MD/NP)    3/19/2024    Clinical Status of Patient:  Outpatient (Ambulatory)    Chief Complaint:  Almas Chowdhury Isaiah is a 24 y.o. female who presents today for follow-up of depression, anxiety and attention problems.  Met with patient.     Interval History and Content of Current Session:  Interim Events/Subjective Report/Content of Current Session:  Almas is seen today for medication follow-up.  She reports that she is doing well, has been more tired than usual, currently 20 weeks pregnant.  Due date is August 3rd.  She feels that sertraline is providing benefits would like to continue the medication as prescribed.  She denies suicidal or homicidal ideation.  No significant stressors at this time.  No other complaints today.    FROM PREVIOUS HPI  Almas is seen today for medication follow-up.  She is now in her second trimester of pregnancy.  They are waiting to do a  gender reveal and she is looking forward to this.  Plans to have the party at the end of March.  She overall is feeling better, feels that nausea and energy levels are improving.  She feels that sertraline has been significantly helping for mood/anxiety.  She is interested in therapy referral and we will order this today.  She denies suicidal or homicidal ideation.  No concerning hallucinations or paranoia reported.  No other complaints today.          3/19/2024    11:14 AM 2/16/2024    10:01 AM 1/12/2024     8:11 AM   GAD7   1. Feeling nervous, anxious, or on edge? 0 0 0   2. Not being able to stop or control worrying? 0 0 0   3. Worrying too much about different things? 0 0 0   4. Trouble relaxing? 0 0 0   5. Being so restless that it is hard to sit still? 0 0 0   6. Becoming easily annoyed or irritable? 0 0 0   7. Feeling afraid as if something awful might happen? 0 0 0   8. If you checked off any problems, how difficult have these problems made it for you to do your work, take care of things at home, or get along with other people?   0   MATHEW-7 Score 0 0 0       Outpatient Psychiatry Initial Visit (MD/NP)     1/28/2021     Almas Colon, a 20 y.o. female, presenting for initial evaluation visit. Met with patient.     Reason for Encounter: Referral from Dina Thayer NP. Patient complains of inattention/concentration.     History of Present Illness:   This is a healthy 20-year-old female, past medical history of well-controlled asthma, who presents for initial evaluation today.  She reports that she has been prescribed Vyvanse 20 mg daily for attention/concentration.  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  Has been on winter break so has not been taking the Vyvanse.  She does report that it has helped her mood, attention, focus, complete schoolwork as well as daily tasks.  She is considering antidepressant therapy but is not sure if that is necessary at this point.  She does start  school next week and will see how she is doing on Vyvanse 40 mg daily and go from there.  She is unsure if she wants to stay on stimulant long-term.  Discussed Strattera for nonstimulant treatment of ADHD.  She will consider this medication as well.  Denies suicidal or homicidal ideation.  Appetite and sleep are stable.  Has supportive family and friends.     She states her mood has been stable.  She is entering her senior year of college.  Was going to graduate May 2021 but is pushing that back to December 2021.  She is looking for engineering internships.  Program is challenging but she enjoys it.  Also established with Jennifer Jackson LCSW for psychotherapy.  Financial situation is a bit stressed.  Living with her mother.  Has been attempting to increase physical activity which does cause her to feel better.  Patient describes that she was on sertraline years ago and did well with that but it did cause her to have some questionable decision making.     Depression symptoms: patient reports little interest or pleasure in doing things; feeling down, depressed, or hopeless; trouble falling asleep or staying asleep, or sleeping too much; feeling tired or having little energy; poor appetite/overeating; trouble concentrating. Denies thoughts of self-harm or suicide.     Anxiety symptoms: reports feeling nervous, anxious, or on edge; not being able to stop or control worrying; worrying too much about different things; becoming easily annoyed or irritable.     Natalie/Hypomania symptoms: denies periods of time in which she has gone without sleep with elevated or irritable mood and impulsive decision making.  Mood disorder questionnaire, she does admit to irritability, racing thoughts, distractibility at times more social or outgoing.     Psychosis: denies     Attention/Concentration:  Improved with Vyvanse     Body Image/Hx of eating disorders: denies     Suicidal ideation and risk: denies, no history of suicide attempts      Homicidal/Violient ideation and risk: denies     Sleep: not sleeping that well, trying to get back into sleep schedule, used to take melatonin      Appetite:  Adequate     GAD7:  Five, somewhat difficult  PHQ9:  13, extremely difficult  MDQ:  Negative screen     Past Psychiatric History:  Prior diagnoses: ADHD, depressive symptoms in the past     Inpatient psychiatric treatment:  No history of inpatient or intensive outpatient     Outpatient psychiatric treatment:  Primary care provider     Prior medications: zoloft, wellbutrin, vyvanse     Current medications: vyvanse     Prior suicide attempts:  None     Prior history self harm:  No history of self-injurious behavior     Prior psychotherapy:  Currently involved with Jennifer Jackson LCSW        Allergies:       Review of patient's allergies indicates:   Allergen Reactions    Ceftriaxone Hives    Cefzil [cefprozil] Hives         Past Medical History:       Past Medical History:   Diagnosis Date    Acne      Asthma      Depression      Kidney stone      Seasonal allergic rhinitis 5/30/2018   G0     History TBI: denies  History seizures: denies     Past Surgical History:        Past Surgical History:   Procedure Laterality Date    TONSILLECTOMY             Family History:   Suicide: denies  Substance use: denies  Bipolar disorder/Psychotic disorder: denies  Anxiety: mother  Depression: mother     Social History:  Childhood: born in [place], raised by biological mother and father. Living with mom now. Father is staying with her grandmother. Establishing a relationship with her father. Two half sisters and step-sister. Doing well. Good relationship.  Marital status: supportive boyfriend  Children: none  Resides: [place]  Occupation: not working right now, going to try find part time job  Hobbies: trying to find a hobby  Moravian: none  Education level: senior in college  : denies  Legal: denies  History of abuse/trauma: a bit of emotional trauma, working  through in therapy     Substance History:  Tobacco: denies  Alcohol: occasionally, rarely  Drug use: denies  Caffeine: cup a day of coffee      Rehab:  Prior/current AA: denies    Review of Systems   PSYCHIATRIC: Pertinant items are noted in the narrative.  RESPIRATORY: No shortness of breath.  CARDIOVASCULAR: No tachycardia or chest pain.  GASTROINTESTINAL: No nausea, vomiting, pain, constipation or diarrhea.    Past Medical, Family and Social History: The patient's past medical, family and social history have been reviewed and updated as appropriate within the electronic medical record - see encounter notes.    Compliance: yes    Side effects: none    Risk Parameters:  Patient reports no suicidal ideation  Patient reports no homicidal ideation  Patient reports no self-injurious behavior  Patient reports no violent behavior    Exam (detailed: at least 9 elements; comprehensive: all 15 elements)   Constitutional  Vitals:  Most recent vital signs, dated less than 90 days prior to this appointment, were reviewed.   There were no vitals filed for this visit.       General:  unremarkable, age appropriate, casually dressed     Musculoskeletal  Muscle Strength/Tone:  no dyskinesia   Gait & Station:  Virtual visit     Psychiatric  Speech:  no latency; no press   Mood & Affect:  good  congruent and appropriate   Thought Process:  normal and logical   Associations:  intact   Thought Content:  normal, no suicidality, no homicidality, delusions, or paranoia   Insight:  intact   Judgement: behavior is adequate to circumstances   Orientation:  grossly intact   Memory: intact for content of interview   Language: grossly intact   Attention Span & Concentration:  able to focus   Fund of Knowledge:  intact and appropriate to age and level of education     Assessment and Diagnosis   Status/Progress: Based on the examination today, the patient's problem(s) is/are improved.  New problems have not been presented today.   Co-morbidities,  Diagnostic uncertainty and Lack of compliance are not complicating management of the primary condition.     General Impression:   Adjustment disorder  ADHD, combined presentation type  Recent confirmed pregnancy    Intervention/Counseling/Treatment Plan   Medication Management: Continue current medications. The risks and benefits of medication were discussed with the patient.  Counseling provided with patient as follows: importance of compliance with chosen treatment options was emphasized, risks and benefits of treatment options, including medications, were discussed with the patient, risk factor reduction, prognosis    Almas is seen today for medication follow up.  Based on assessment:    Continue sertraline 25 mg once daily for mood/anxiety. Will have very close f/u with this patient.     Please go to emergency department if feeling as though you are a harm to yourself or others or if you are in crisis.     Please call the clinic to report any worsening of symptoms or problems associated with medication.    This medication has US boxed warning that states there may be an increased risk of suicidal thoughts and behavior in pediatric and young adult patients in short-term studies. Closely monitor all antidepressant-treated patients for clinical worsening, and for emergence of suicidal thoughts and behaviors.    Return to Clinic: as scheduled, as needed

## 2024-03-21 ENCOUNTER — PATIENT MESSAGE (OUTPATIENT)
Dept: OTHER | Facility: OTHER | Age: 24
End: 2024-03-21
Payer: COMMERCIAL

## 2024-03-22 ENCOUNTER — ROUTINE PRENATAL (OUTPATIENT)
Dept: OBSTETRICS AND GYNECOLOGY | Facility: CLINIC | Age: 24
End: 2024-03-22
Payer: COMMERCIAL

## 2024-03-22 ENCOUNTER — HOSPITAL ENCOUNTER (OUTPATIENT)
Dept: RADIOLOGY | Facility: HOSPITAL | Age: 24
Discharge: HOME | End: 2024-03-22
Attending: OBSTETRICS & GYNECOLOGY
Payer: COMMERCIAL

## 2024-03-22 VITALS
DIASTOLIC BLOOD PRESSURE: 70 MMHG | BODY MASS INDEX: 32.39 KG/M2 | SYSTOLIC BLOOD PRESSURE: 120 MMHG | WEIGHT: 188.69 LBS

## 2024-03-22 DIAGNOSIS — Z3A.17 17 WEEKS GESTATION OF PREGNANCY: ICD-10-CM

## 2024-03-22 DIAGNOSIS — Z3A.20 20 WEEKS GESTATION OF PREGNANCY: Primary | ICD-10-CM

## 2024-03-22 LAB
BILIRUBIN, UA POC OHS: NEGATIVE
BLOOD, UA POC OHS: NEGATIVE
CLARITY, UA POC OHS: CLEAR
COLOR, UA POC OHS: YELLOW
GLUCOSE, UA POC OHS: NEGATIVE
KETONES, UA POC OHS: NEGATIVE
LEUKOCYTES, UA POC OHS: ABNORMAL
NITRITE, UA POC OHS: NEGATIVE
PH, UA POC OHS: 7
PROTEIN, UA POC OHS: NEGATIVE
SPECIFIC GRAVITY, UA POC OHS: 1.01
UROBILINOGEN, UA POC OHS: 0.2

## 2024-03-22 PROCEDURE — 76805 OB US >/= 14 WKS SNGL FETUS: CPT | Mod: TC,PN

## 2024-03-22 PROCEDURE — 0502F SUBSEQUENT PRENATAL CARE: CPT | Mod: CPTII,S$GLB,, | Performed by: OBSTETRICS & GYNECOLOGY

## 2024-03-22 PROCEDURE — 99999 PR PBB SHADOW E&M-EST. PATIENT-LVL III: CPT | Mod: PBBFAC,,, | Performed by: OBSTETRICS & GYNECOLOGY

## 2024-03-22 PROCEDURE — 76805 OB US >/= 14 WKS SNGL FETUS: CPT | Mod: 26,,, | Performed by: RADIOLOGY

## 2024-03-22 NOTE — PROGRESS NOTES
The patient presents with No complaints.   Reports: Good fetal movements reported. No Bleeding or pains    3/22/2024  24 y.o. 20w1d Estimated Date of Delivery: 24, dating reviewed.   OB History    Para Term  AB Living   1             SAB IAB Ectopic Multiple Live Births                  # Outcome Date GA Lbr Hugo/2nd Weight Sex Delivery Anes PTL Lv   1 Current                Prenatal labs reviewed and updated today    Review of Systems:  General ROS: negative for headache or visual changes  Breast ROS: negative for breast lumps  Gastrointestinal ROS: negative for  constipation, diarrhea or nausea/vomiting  Musculoskeletal ROS: negative for pain in joints or swelling in face or hands.   Neurological ROS: negative for - headaches, numbness/tingling or visual changes      Physical Exam:  /70   Wt 85.6 kg (188 lb 11.4 oz)   LMP 10/28/2023 (Exact Date)   BMI 32.39 kg/m²   Urine Dip: Pending  Fundal Height:22cm  Fetal Heart Tones: 155bpm  Constitutional: She is oriented to person, place, and time. She appears well-developed and well-nourished. No distress. Overweight   Cardiovascular: Normal rate.    Pulmonary/Chest: Effort normal. No respiratory distress  Abdominal: Soft, gravid, nontender. No rebound and no guarding.     Genitourinary: Deferred    Musculoskeletal: Normal range of motion, Minimal peripheral edema.   Neurological: She is alert and oriented to person, place, and time. Coordination normal.   Skin: Skin is warm and dry. She is not diaphoretic.  Psychiatric: She has a normal mood and affect.        Assessment:  24 y.o., at 20w1d Gestation   Patient Active Problem List   Diagnosis    Seasonal allergic rhinitis     Current Outpatient Medications on File Prior to Visit   Medication Sig Dispense Refill    ascorbic acid, vitamin C, (VITAMIN C) 1000 MG tablet Take 1,000 mg by mouth once daily.      b complex vitamins capsule Take 1 capsule by mouth once daily.      budesonide-formoterol  160-4.5 mcg (SYMBICORT) 160-4.5 mcg/actuation HFAA Inhale 2 puffs into the lungs every 12 (twelve) hours. Controller 10.2 g 2    ergocalciferol (ERGOCALCIFEROL) 50,000 unit Cap Take 50,000 Units by mouth every 7 days.      FLAXSEED ORAL Take by mouth.      Lactobacillus acidophilus (PROBIOTIC ACIDOPHILUS ORAL) Take by mouth once daily.      prenatal vit no.124/iron/folic (PRENATAL VITAMIN ORAL) Take by mouth.      sertraline (ZOLOFT) 25 MG tablet Take 1 tablet (25 mg total) by mouth once daily. 30 tablet 2    zinc gluconate 50 mg tablet Take 50 mg by mouth once daily.      loratadine (CLARITIN) 10 mg tablet Take 1 tablet (10 mg total) by mouth once daily.  0     No current facility-administered medications on file prior to visit.         Plan:   Labs today: none  Orders today: none  MEds today: none  Procedures Today: u/s anatomy today none  Follow up 3 Weeks, bleeding/pain precautions

## 2024-03-25 ENCOUNTER — PATIENT MESSAGE (OUTPATIENT)
Dept: OBSTETRICS AND GYNECOLOGY | Facility: CLINIC | Age: 24
End: 2024-03-25
Payer: COMMERCIAL

## 2024-04-01 ENCOUNTER — PATIENT MESSAGE (OUTPATIENT)
Dept: PSYCHIATRY | Facility: CLINIC | Age: 24
End: 2024-04-01
Payer: COMMERCIAL

## 2024-04-01 DIAGNOSIS — F41.1 GAD (GENERALIZED ANXIETY DISORDER): ICD-10-CM

## 2024-04-01 RX ORDER — SERTRALINE HYDROCHLORIDE 25 MG/1
25 TABLET, FILM COATED ORAL DAILY
Qty: 30 TABLET | Refills: 2 | Status: SHIPPED | OUTPATIENT
Start: 2024-04-01 | End: 2024-06-18 | Stop reason: SDUPTHER

## 2024-04-12 ENCOUNTER — ROUTINE PRENATAL (OUTPATIENT)
Dept: OBSTETRICS AND GYNECOLOGY | Facility: CLINIC | Age: 24
End: 2024-04-12
Payer: COMMERCIAL

## 2024-04-12 VITALS
SYSTOLIC BLOOD PRESSURE: 120 MMHG | BODY MASS INDEX: 33.11 KG/M2 | WEIGHT: 192.88 LBS | DIASTOLIC BLOOD PRESSURE: 72 MMHG

## 2024-04-12 DIAGNOSIS — Z3A.23 23 WEEKS GESTATION OF PREGNANCY: Primary | ICD-10-CM

## 2024-04-12 PROCEDURE — 99999 PR PBB SHADOW E&M-EST. PATIENT-LVL II: CPT | Mod: PBBFAC,,, | Performed by: OBSTETRICS & GYNECOLOGY

## 2024-04-12 PROCEDURE — 0502F SUBSEQUENT PRENATAL CARE: CPT | Mod: CPTII,S$GLB,, | Performed by: OBSTETRICS & GYNECOLOGY

## 2024-04-12 NOTE — PROGRESS NOTES
The patient presents with no complaints.   Reports: Good fetal movements reported, No Bleeding or pains    2024  24 y.o. 23w1d Estimated Date of Delivery: 24, dating reviewed.   OB History    Para Term  AB Living   1             SAB IAB Ectopic Multiple Live Births                  # Outcome Date GA Lbr Hugo/2nd Weight Sex Delivery Anes PTL Lv   1 Current                Prenatal labs reviewed and updated today    Review of Systems:  General ROS: negative for headache or visual changes  Breast ROS: negative for breast lumps  Gastrointestinal ROS: negative for  constipation, diarrhea or nausea/vomiting  Musculoskeletal ROS: negative for pain in joints or swelling in face or hands.   Neurological ROS: negative for - headaches, numbness/tingling or visual changes      Physical Exam:  /72   Wt 87.5 kg (192 lb 14.4 oz)   LMP 10/28/2023 (Exact Date)   BMI 33.11 kg/m²   Urine Dip: Pending  Fundal Height:25cm  Fetal Heart Tones: 150bpm  Constitutional: She is oriented to person, place, and time. She appears well-developed and well-nourished. No distress. Overweight   Cardiovascular: Normal rate.    Pulmonary/Chest: Effort normal. No respiratory distress  Abdominal: Soft, gravid, nontender. No rebound and no guarding.     Genitourinary: Deferred    Musculoskeletal: Normal range of motion, Minimal peripheral edema.   Neurological: She is alert and oriented to person, place, and time. Coordination normal.   Skin: Skin is warm and dry. She is not diaphoretic.  Psychiatric: She has a normal mood and affect.        Assessment:  24 y.o., at 23w1d Gestation   Patient Active Problem List   Diagnosis    Seasonal allergic rhinitis     Current Outpatient Medications on File Prior to Visit   Medication Sig Dispense Refill    prenatal vit no.124/iron/folic (PRENATAL VITAMIN ORAL) Take by mouth.      sertraline (ZOLOFT) 25 MG tablet Take 1 tablet (25 mg total) by mouth once daily. 30 tablet 2    ascorbic  acid, vitamin C, (VITAMIN C) 1000 MG tablet Take 1,000 mg by mouth once daily.      b complex vitamins capsule Take 1 capsule by mouth once daily.      budesonide-formoterol 160-4.5 mcg (SYMBICORT) 160-4.5 mcg/actuation HFAA Inhale 2 puffs into the lungs every 12 (twelve) hours. Controller (Patient not taking: Reported on 4/12/2024) 10.2 g 2    ergocalciferol (ERGOCALCIFEROL) 50,000 unit Cap Take 50,000 Units by mouth every 7 days.      FLAXSEED ORAL Take by mouth.      Lactobacillus acidophilus (PROBIOTIC ACIDOPHILUS ORAL) Take by mouth once daily.      loratadine (CLARITIN) 10 mg tablet Take 1 tablet (10 mg total) by mouth once daily.  0    zinc gluconate 50 mg tablet Take 50 mg by mouth once daily.       No current facility-administered medications on file prior to visit.       Plan:   Labs today: none  Orders today: glucola 3 weeks.  MEds today: none  Procedures Today: none  Follow up 3 Weeks, bleeding/pain precautions ,  kick counts, labor precautions

## 2024-04-18 ENCOUNTER — PATIENT MESSAGE (OUTPATIENT)
Dept: OTHER | Facility: OTHER | Age: 24
End: 2024-04-18
Payer: COMMERCIAL

## 2024-04-23 ENCOUNTER — OFFICE VISIT (OUTPATIENT)
Dept: PSYCHIATRY | Facility: CLINIC | Age: 24
End: 2024-04-23
Payer: COMMERCIAL

## 2024-04-23 DIAGNOSIS — F90.2 ATTENTION DEFICIT HYPERACTIVITY DISORDER (ADHD), COMBINED TYPE: ICD-10-CM

## 2024-04-23 DIAGNOSIS — F41.1 GAD (GENERALIZED ANXIETY DISORDER): Primary | ICD-10-CM

## 2024-04-23 PROCEDURE — G2211 COMPLEX E/M VISIT ADD ON: HCPCS | Mod: 95,,, | Performed by: PHYSICIAN ASSISTANT

## 2024-04-23 PROCEDURE — 1160F RVW MEDS BY RX/DR IN RCRD: CPT | Mod: CPTII,95,, | Performed by: PHYSICIAN ASSISTANT

## 2024-04-23 PROCEDURE — 1159F MED LIST DOCD IN RCRD: CPT | Mod: CPTII,95,, | Performed by: PHYSICIAN ASSISTANT

## 2024-04-23 PROCEDURE — 99214 OFFICE O/P EST MOD 30 MIN: CPT | Mod: 95,,, | Performed by: PHYSICIAN ASSISTANT

## 2024-04-23 NOTE — PROGRESS NOTES
The patient location is: at work in [place], [place]  The chief complaint leading to consultation is: dep/anxiety    Visit type: audiovisual    Face to Face time with patient: 15  30 minutes of total time spent on the encounter, which includes face to face time and non-face to face time preparing to see the patient (eg, review of tests), Obtaining and/or reviewing separately obtained history, Documenting clinical information in the electronic or other health record, Independently interpreting results (not separately reported) and communicating results to the patient/family/caregiver, or Care coordination (not separately reported).     Each patient to whom he or she provides medical services by telemedicine is:  (1) informed of the relationship between the physician and patient and the respective role of any other health care provider with respect to management of the patient; and (2) notified that he or she may decline to receive medical services by telemedicine and may withdraw from such care at any time.    Outpatient Psychiatry Follow-Up Visit (MD/NP)    4/23/2024    Clinical Status of Patient:  Outpatient (Ambulatory)    Chief Complaint:  Almas Chowdhury Isaiah is a 24 y.o. female who presents today for follow-up of depression, anxiety and attention problems.  Met with patient.     Interval History and Content of Current Session:  Interim Events/Subjective Report/Content of Current Session:  Almas is seen virtually today for medication follow up. Doing well - found out sex of baby. It's a girl. Her and her boyfriend are excited about having a girl. Having some waves of nausea but doing overall well.  Would like to continue sertraline as prescribed.  Denies suicidal or homicidal ideation.  No other complaints today.    FROM PREVIOUS HPI  Almas is seen today for medication follow-up.  She reports that she is doing well, has been more tired than usual, currently 20 weeks pregnant.  Due date is August 3rd.  She  feels that sertraline is providing benefits would like to continue the medication as prescribed.  She denies suicidal or homicidal ideation.  No significant stressors at this time.  No other complaints today.        4/23/2024    10:07 AM 3/19/2024    11:14 AM 2/16/2024    10:01 AM   GAD7   1. Feeling nervous, anxious, or on edge? 0 0 0   2. Not being able to stop or control worrying? 0 0 0   3. Worrying too much about different things? 0 0 0   4. Trouble relaxing? 0 0 0   5. Being so restless that it is hard to sit still? 0 0 0   6. Becoming easily annoyed or irritable? 0 0 0   7. Feeling afraid as if something awful might happen? 0 0 0   MATHEW-7 Score 0 0 0       Outpatient Psychiatry Initial Visit (MD/NP)     1/28/2021     Almas Colon, a 20 y.o. female, presenting for initial evaluation visit. Met with patient.     Reason for Encounter: Referral from Dina Thayer NP. Patient complains of inattention/concentration.     History of Present Illness:   This is a healthy 20-year-old female, past medical history of well-controlled asthma, who presents for initial evaluation today.  She reports that she has been prescribed Vyvanse 20 mg daily for attention/concentration.  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  Has been on winter break so has not been taking the Vyvanse.  She does report that it has helped her mood, attention, focus, complete schoolwork as well as daily tasks.  She is considering antidepressant therapy but is not sure if that is necessary at this point.  She does start school next week and will see how she is doing on Vyvanse 40 mg daily and go from there.  She is unsure if she wants to stay on stimulant long-term.  Discussed Strattera for nonstimulant treatment of ADHD.  She will consider this medication as well.  Denies suicidal or homicidal ideation.  Appetite and sleep are stable.  Has supportive family and friends.     She states her mood has been stable.  She is  entering her senior year of college.  Was going to graduate May 2021 but is pushing that back to December 2021.  She is looking for engineering internships.  Program is challenging but she enjoys it.  Also established with Jennifer Jackson LCSW for psychotherapy.  Financial situation is a bit stressed.  Living with her mother.  Has been attempting to increase physical activity which does cause her to feel better.  Patient describes that she was on sertraline years ago and did well with that but it did cause her to have some questionable decision making.     Depression symptoms: patient reports little interest or pleasure in doing things; feeling down, depressed, or hopeless; trouble falling asleep or staying asleep, or sleeping too much; feeling tired or having little energy; poor appetite/overeating; trouble concentrating. Denies thoughts of self-harm or suicide.     Anxiety symptoms: reports feeling nervous, anxious, or on edge; not being able to stop or control worrying; worrying too much about different things; becoming easily annoyed or irritable.     Natalie/Hypomania symptoms: denies periods of time in which she has gone without sleep with elevated or irritable mood and impulsive decision making.  Mood disorder questionnaire, she does admit to irritability, racing thoughts, distractibility at times more social or outgoing.     Psychosis: denies     Attention/Concentration:  Improved with Vyvanse     Body Image/Hx of eating disorders: denies     Suicidal ideation and risk: denies, no history of suicide attempts     Homicidal/Violient ideation and risk: denies     Sleep: not sleeping that well, trying to get back into sleep schedule, used to take melatonin      Appetite:  Adequate     GAD7:  Five, somewhat difficult  PHQ9:  13, extremely difficult  MDQ:  Negative screen     Past Psychiatric History:  Prior diagnoses: ADHD, depressive symptoms in the past     Inpatient psychiatric treatment:  No history of  inpatient or intensive outpatient     Outpatient psychiatric treatment:  Primary care provider     Prior medications: zoloft, wellbutrin, vyvanse     Current medications: vyvanse     Prior suicide attempts:  None     Prior history self harm:  No history of self-injurious behavior     Prior psychotherapy:  Currently involved with Jennifer Jackson LCSW        Allergies:       Review of patient's allergies indicates:   Allergen Reactions    Ceftriaxone Hives    Cefzil [cefprozil] Hives         Past Medical History:       Past Medical History:   Diagnosis Date    Acne      Asthma      Depression      Kidney stone      Seasonal allergic rhinitis 5/30/2018   G0     History TBI: denies  History seizures: denies     Past Surgical History:        Past Surgical History:   Procedure Laterality Date    TONSILLECTOMY             Family History:   Suicide: denies  Substance use: denies  Bipolar disorder/Psychotic disorder: denies  Anxiety: mother  Depression: mother     Social History:  Childhood: born in [place], raised by biological mother and father. Living with mom now. Father is staying with her grandmother. Establishing a relationship with her father. Two half sisters and step-sister. Doing well. Good relationship.  Marital status: supportive boyfriend  Children: none  Resides: [place]  Occupation: not working right now, going to try find part time job  Hobbies: trying to find a hobby  Presybeterian: none  Education level: senior in college  : denies  Legal: denies  History of abuse/trauma: a bit of emotional trauma, working through in therapy     Substance History:  Tobacco: denies  Alcohol: occasionally, rarely  Drug use: denies  Caffeine: cup a day of coffee      Rehab:  Prior/current AA: denies    Review of Systems   PSYCHIATRIC: Pertinant items are noted in the narrative.  RESPIRATORY: No shortness of breath.  CARDIOVASCULAR: No tachycardia or chest pain.  GASTROINTESTINAL: No nausea, vomiting, pain,  constipation or diarrhea.    Past Medical, Family and Social History: The patient's past medical, family and social history have been reviewed and updated as appropriate within the electronic medical record - see encounter notes.    Compliance: yes    Side effects: none    Risk Parameters:  Patient reports no suicidal ideation  Patient reports no homicidal ideation  Patient reports no self-injurious behavior  Patient reports no violent behavior    Exam (detailed: at least 9 elements; comprehensive: all 15 elements)   Constitutional  Vitals:  Most recent vital signs, dated less than 90 days prior to this appointment, were reviewed.   There were no vitals filed for this visit.       General:  unremarkable, age appropriate, casually dressed     Musculoskeletal  Muscle Strength/Tone:  no dyskinesia   Gait & Station:  Virtual visit     Psychiatric  Speech:  no latency; no press   Mood & Affect:  good  congruent and appropriate   Thought Process:  normal and logical   Associations:  intact   Thought Content:  normal, no suicidality, no homicidality, delusions, or paranoia   Insight:  intact   Judgement: behavior is adequate to circumstances   Orientation:  grossly intact   Memory: intact for content of interview   Language: grossly intact   Attention Span & Concentration:  able to focus   Fund of Knowledge:  intact and appropriate to age and level of education     Assessment and Diagnosis   Status/Progress: Based on the examination today, the patient's problem(s) is/are improved.  New problems have not been presented today.   Co-morbidities, Diagnostic uncertainty and Lack of compliance are not complicating management of the primary condition.     General Impression:   Adjustment disorder  ADHD, combined presentation type  Recent confirmed pregnancy    Intervention/Counseling/Treatment Plan   Medication Management: Continue current medications. The risks and benefits of medication were discussed with the  patient.  Counseling provided with patient as follows: importance of compliance with chosen treatment options was emphasized, risks and benefits of treatment options, including medications, were discussed with the patient, risk factor reduction, prognosis    Almas is seen today for medication follow up.  Based on assessment:    Continue sertraline 25 mg once daily for mood/anxiety. Will have very close f/u with this patient.     Please go to emergency department if feeling as though you are a harm to yourself or others or if you are in crisis.     Please call the clinic to report any worsening of symptoms or problems associated with medication.    This medication has US boxed warning that states there may be an increased risk of suicidal thoughts and behavior in pediatric and young adult patients in short-term studies. Closely monitor all antidepressant-treated patients for clinical worsening, and for emergence of suicidal thoughts and behaviors.    Return to Clinic: as scheduled, as needed

## 2024-04-24 ENCOUNTER — CLINICAL SUPPORT (OUTPATIENT)
Dept: OBSTETRICS AND GYNECOLOGY | Facility: CLINIC | Age: 24
End: 2024-04-24
Payer: COMMERCIAL

## 2024-04-24 DIAGNOSIS — Z71.9 COUNSELED BY NURSE: Primary | ICD-10-CM

## 2024-04-24 NOTE — PROGRESS NOTES
Spoke with patient for a total of  minutes 30.  Updated chart to reflect up to date patient demographics.  Medication, pharmacy, and family history updated.  Patient was guided through expectations of OB/GYN care throughout history of pregnancy.  Pregnancy confirmation, dating u/s initial OB  scheduled for the pregnancy.

## 2024-05-02 ENCOUNTER — PATIENT MESSAGE (OUTPATIENT)
Dept: OTHER | Facility: OTHER | Age: 24
End: 2024-05-02
Payer: COMMERCIAL

## 2024-05-10 ENCOUNTER — ROUTINE PRENATAL (OUTPATIENT)
Dept: OBSTETRICS AND GYNECOLOGY | Facility: CLINIC | Age: 24
End: 2024-05-10
Payer: COMMERCIAL

## 2024-05-10 ENCOUNTER — LAB VISIT (OUTPATIENT)
Dept: LAB | Facility: HOSPITAL | Age: 24
End: 2024-05-10
Attending: STUDENT IN AN ORGANIZED HEALTH CARE EDUCATION/TRAINING PROGRAM
Payer: COMMERCIAL

## 2024-05-10 VITALS
BODY MASS INDEX: 34.66 KG/M2 | SYSTOLIC BLOOD PRESSURE: 128 MMHG | DIASTOLIC BLOOD PRESSURE: 76 MMHG | WEIGHT: 201.94 LBS

## 2024-05-10 DIAGNOSIS — Z3A.23 23 WEEKS GESTATION OF PREGNANCY: ICD-10-CM

## 2024-05-10 DIAGNOSIS — Z3A.27 27 WEEKS GESTATION OF PREGNANCY: Primary | ICD-10-CM

## 2024-05-10 LAB
ERYTHROCYTE [DISTWIDTH] IN BLOOD BY AUTOMATED COUNT: 12.2 % (ref 11.5–14.5)
GLUCOSE SERPL-MCNC: 127 MG/DL (ref 70–140)
HCT VFR BLD AUTO: 30.6 % (ref 37–48.5)
HGB BLD-MCNC: 10.5 G/DL (ref 12–16)
MCH RBC QN AUTO: 31.5 PG (ref 27–31)
MCHC RBC AUTO-ENTMCNC: 34.3 G/DL (ref 32–36)
MCV RBC AUTO: 92 FL (ref 82–98)
PLATELET # BLD AUTO: 261 K/UL (ref 150–450)
PMV BLD AUTO: 10.3 FL (ref 9.2–12.9)
RBC # BLD AUTO: 3.33 M/UL (ref 4–5.4)
WBC # BLD AUTO: 10.51 K/UL (ref 3.9–12.7)

## 2024-05-10 PROCEDURE — 82950 GLUCOSE TEST: CPT | Performed by: OBSTETRICS & GYNECOLOGY

## 2024-05-10 PROCEDURE — 85027 COMPLETE CBC AUTOMATED: CPT | Performed by: OBSTETRICS & GYNECOLOGY

## 2024-05-10 PROCEDURE — 36415 COLL VENOUS BLD VENIPUNCTURE: CPT | Mod: PN | Performed by: OBSTETRICS & GYNECOLOGY

## 2024-05-10 PROCEDURE — 0502F SUBSEQUENT PRENATAL CARE: CPT | Mod: CPTII,S$GLB,, | Performed by: OBSTETRICS & GYNECOLOGY

## 2024-05-10 PROCEDURE — 99999 PR PBB SHADOW E&M-EST. PATIENT-LVL III: CPT | Mod: PBBFAC,,, | Performed by: OBSTETRICS & GYNECOLOGY

## 2024-05-10 NOTE — PROGRESS NOTES
Pregnancy dating, labs, ultrasound reports, prenatal testing, and problem list; prior records and results; and available outside records were reviewed and updated in EMR.  Pertinent findings were noted below.    Reason for Visit   Routine Prenatal Visit    HPI   24 y.o., at 27w1d by Estimated Date of Delivery: 24    She is doing well.  Has some pelvic pressure off and on    Contractions: No   Bleeding: No   Loss of fluid: No   Fetal movement: Yes   Nausea: No   Vomiting: No   Headache: No     Exam   /76   Wt 91.6 kg (201 lb 15.1 oz)   LMP 10/28/2023 (Exact Date)   BMI 34.66 kg/m²     GENERAL: No acute distress  ABD: Gravid    Assessment and Plan   27 weeks gestation of pregnancy      [facility] BID  PNC  SPADD  Tdap/ RHOGAM next visit     labor precautions given  Follow-up: 2 weeks

## 2024-05-14 ENCOUNTER — PATIENT MESSAGE (OUTPATIENT)
Dept: OBSTETRICS AND GYNECOLOGY | Facility: CLINIC | Age: 24
End: 2024-05-14
Payer: COMMERCIAL

## 2024-05-14 DIAGNOSIS — D50.8 IRON DEFICIENCY ANEMIA SECONDARY TO INADEQUATE DIETARY IRON INTAKE: Primary | ICD-10-CM

## 2024-05-14 RX ORDER — FERROUS SULFATE 325(65) MG
325 TABLET, DELAYED RELEASE (ENTERIC COATED) ORAL 2 TIMES DAILY
Qty: 60 TABLET | Refills: 1 | Status: SHIPPED | OUTPATIENT
Start: 2024-05-14

## 2024-05-16 ENCOUNTER — PATIENT MESSAGE (OUTPATIENT)
Dept: OTHER | Facility: OTHER | Age: 24
End: 2024-05-16
Payer: COMMERCIAL

## 2024-05-21 ENCOUNTER — NURSE TRIAGE (OUTPATIENT)
Dept: ADMINISTRATIVE | Facility: CLINIC | Age: 24
End: 2024-05-21
Payer: COMMERCIAL

## 2024-05-21 ENCOUNTER — HOSPITAL ENCOUNTER (EMERGENCY)
Facility: OTHER | Age: 24
Discharge: HOME | End: 2024-05-21
Attending: EMERGENCY MEDICINE
Payer: COMMERCIAL

## 2024-05-21 VITALS
TEMPERATURE: 98 F | RESPIRATION RATE: 18 BRPM | HEART RATE: 98 BPM | SYSTOLIC BLOOD PRESSURE: 125 MMHG | DIASTOLIC BLOOD PRESSURE: 72 MMHG | OXYGEN SATURATION: 97 %

## 2024-05-21 DIAGNOSIS — Z03.71 ENCOUNTER FOR SUSPECTED PREMATURE RUPTURE OF AMNIOTIC MEMBRANES, WITH RUPTURE OF MEMBRANES NOT FOUND: Primary | ICD-10-CM

## 2024-05-21 DIAGNOSIS — Z3A.28 28 WEEKS GESTATION OF PREGNANCY: ICD-10-CM

## 2024-05-21 LAB
FERN TEST: NEGATIVE
PH, BODY FLUID: NORMAL

## 2024-05-21 PROCEDURE — 59025 FETAL NON-STRESS TEST: CPT

## 2024-05-21 PROCEDURE — 59025 FETAL NON-STRESS TEST: CPT | Mod: 26,59,, | Performed by: OBSTETRICS & GYNECOLOGY

## 2024-05-21 PROCEDURE — 99284 EMERGENCY DEPT VISIT MOD MDM: CPT | Mod: 25,,, | Performed by: OBSTETRICS & GYNECOLOGY

## 2024-05-21 PROCEDURE — 81002 URINALYSIS NONAUTO W/O SCOPE: CPT

## 2024-05-21 PROCEDURE — 76815 OB US LIMITED FETUS(S): CPT | Mod: 26,,, | Performed by: OBSTETRICS & GYNECOLOGY

## 2024-05-21 PROCEDURE — 99284 EMERGENCY DEPT VISIT MOD MDM: CPT

## 2024-05-21 NOTE — ED PROVIDER NOTES
Encounter Date: 2024       History     Chief Complaint   Patient presents with    Vomiting    Rupture of Membranes     Almas Colon is a 24 y.o. F at 28w5d presents complaining of possible ROM at 0815 when she was having an episode of emesis. After she finish vomiting she noticed her underwear was soaked. She was able to tolerate breakfast after vomiting and then presented to [facility].    This IUP is complicated by rh neg.  Patient denies contractions, denies vaginal bleeding, reports LOF.   Fetal Movement: normal        Review of patient's allergies indicates:   Allergen Reactions    Cefzil [cefprozil] Hives    Rocephin [ceftriaxone] Hives     Past Medical History:   Diagnosis Date    Acne     Anxiety     Asthma     Depression     Seasonal allergic rhinitis 2018     Past Surgical History:   Procedure Laterality Date    TONSILLECTOMY       Family History   Problem Relation Name Age of Onset    Glaucoma Paternal Grandmother      Diabetes Maternal Grandmother      Acne Father Stas     Acne Mother Livia     Cataracts Neg Hx      Macular degeneration Neg Hx      Retinal detachment Neg Hx      Uterine cancer Neg Hx      Breast cancer Neg Hx      Ovarian cancer Neg Hx      Cervical cancer Neg Hx       Social History     Tobacco Use    Smoking status: Never    Smokeless tobacco: Never   Substance Use Topics    Alcohol use: Not Currently     Comment: Occasionally. Maybe once a month or every few months    Drug use: No     Review of Systems   Constitutional:  Negative for chills and fever.   HENT:  Negative for congestion.    Eyes:  Negative for visual disturbance.   Respiratory:  Negative for shortness of breath.    Cardiovascular:  Negative for chest pain and palpitations.   Gastrointestinal:  Positive for vomiting. Negative for constipation, diarrhea and nausea.   Genitourinary:  Positive for vaginal discharge. Negative for dysuria and vaginal bleeding.   Musculoskeletal:  Negative for back pain.    Skin:  Negative for rash.   Neurological:  Negative for light-headedness and headaches.       Physical Exam     Initial Vitals   BP Pulse Resp Temp SpO2   05/21/24 0950 05/21/24 1000 05/21/24 0950 05/21/24 0950 05/21/24 1000   134/80 96 18 98.2 °F (36.8 °C) 97 %      MAP       --                Physical Exam    Vitals reviewed.  Constitutional: She appears well-developed and well-nourished. She is not diaphoretic. No distress.   HENT:   Head: Normocephalic and atraumatic.   Eyes: Conjunctivae are normal.   Cardiovascular:  Normal rate.           Pulmonary/Chest: No respiratory distress.   Abdominal: There is no abdominal tenderness. There is no rebound and no guarding.   Musculoskeletal:         General: No edema.     Neurological: She is alert and oriented to person, place, and time.   Skin: Skin is warm and dry.   Psychiatric: She has a normal mood and affect.     OB LABOR EXAM:     Membranes ruptured: No.   Method: Sterile speculum exam per MD.   Vaginal Bleeding: none present.     Dilatation: 0.       Amniotic Fluid Color: no fluid.     Comments: Negative pooling, negative Nitrazine, ferning negative        ED Course   Obtain Fetal nonstress test (NST)    Date/Time: 5/21/2024 10:18 AM    Performed by: Tiera Dorantes MD  Authorized by: Tiera Dorantes MD    Nonstress Test:     Variability:  6-25 BPM    Decelerations:  None    Accelerations:  10 bpm    Baseline:  140    Contractions:  Not present  Biophysical Profile:     Nonstress Test Interpretation: appropriate for gestational age      Overall Impression:  Reassuring    Labs Reviewed   POCT PH OF BODY FLUIDS   POCT FERN TEST, AMNIO          Imaging Results    None          Medications - No data to display  Medical Decision Making  - VSS  - NST appropriate for GA  - [place] no contractions  - Rupture exam negative  - MVP 6.47  - Discussed with patient unlikely membranes are ruptured  - Patient stable for discharge at this time  - ED return precautions  given  - She voiced understanding and is in agreement with the plan      Amount and/or Complexity of Data Reviewed  Labs: ordered.              Attending Attestation:   Physician Attestation Statement for Resident:  As the supervising MD   Physician Attestation Statement: I have personally seen and examined this patient.   I agree with the above history.  -:   As the supervising MD I agree with the above PE.     As the supervising MD I agree with the above treatment, course, plan, and disposition.   -: NST reactive and reassuring, toco with no contractions.  Exam not c/w labor or SROM.  Agree with discharge to home.  Will f/u with OB in 1 week.    I was personally present during the critical portions of the procedure(s) performed by the resident and was immediately available in the ED to provide services and assistance as needed during the entire procedure.   I have reviewed the following: old records at this facility.                                 Tiera Armenta MD  Obstetrics and Gynecology, PGY-1        Clinical Impression:  Final diagnoses:  [Z03.71] Encounter for suspected premature rupture of amniotic membranes, with rupture of membranes not found (Primary)  [Z3A.28] 28 weeks gestation of pregnancy          ED Disposition Condition    Discharge Stable          ED Prescriptions    None       Follow-up Information    None          Tiera Armenta MD  Resident  05/21/24 1024       Cleo Esqueda MD  05/25/24 [id number]

## 2024-05-21 NOTE — TELEPHONE ENCOUNTER
"28/ gestation, states was vomiting this am. But when she woke up her underwear was wet. She is unsure what it is. Advised could be urine, but also could be her water bag. Due to  gestation advised to go to L and D to verify. Verb understanding.   Reason for Disposition   Leakage of fluid from vagina    Additional Information   Negative: Passed out (i.e., fainted, collapsed and was not responding)   Negative: Shock suspected (e.g., cold/pale/clammy skin, too weak to stand, low BP, rapid pulse)   Negative: Difficult to awaken or acting confused (e.g., disoriented, slurred speech)   Negative: SEVERE constant abdominal pain (e.g., excruciating) and present > 1 hour   Negative: SEVERE bleeding (e.g., continuous red blood from vagina, or large blood clots)   Negative: Umbilical cord hanging out of the vagina (shiny, white, curled appearance, "like telephone cord")   Negative: Uncontrollable urge to push (i.e., feels like baby is coming out now)   Negative: Can see baby   Negative: Sounds like a life-threatening emergency to the triager   Negative: MODERATE-SEVERE abdominal pain   Negative: Contractions < 10 minutes apart for 1 hour (i.e., 6 or more contractions an hour)   Negative: Contractions > 10 minutes apart that persist > 24 hours, and no improvement using Care Advice   Negative: Contractions and any vaginal bleeding (including: red blood, clots, spotting, or pink/brown mucous)   Negative: Vaginal bleeding or spotting  (Exception: Brief spotting after intercourse or pelvic exam.)    Protocols used: Pregnancy - Labor - [id number]    "

## 2024-05-21 NOTE — DISCHARGE INSTRUCTIONS
CHIEF COMPLAINT:  Physical (With Pap)    HISTORY OF PRESENT ILLNESS: Patient is a 43 year old female here for a physical with chief concerns of chronic right-sided hip pain.  She uses ibuprofen and Aleve with some improvement of symptoms.  She states that the pain is there most days but some days are worse than others.  It is worse associated with movement.  She states if she sits too long or lays too long symptoms are worse.  Pain does seem to radiate actually from the lower back area and down into the hip, behind her leg and into the right knee.  She is a pulling sensation in the back of her leg and describes an achiness.  She denies numbness, tingling, or weakness of the lower extremity.  She denies loss of bowel or bladder control.      Patient would like to have STD screening done.  She is not been with any new sexual partners but states that she just has some irritation in the vaginal area.  She has noticed increased itchiness particularly right before or right after her menstrual cycle.  She does not notice any unusual vaginal discharge.  She denies pelvic pain.    Patient has skin tags around her neck that she would like to have removed possible.  They are not bothersome.  She is requesting a referral to see Dermatology.    MEDICATIONS:    No current outpatient medications on file.     No current facility-administered medications for this visit.       ALLERGIES:   Allergen Reactions   • Amoxicillin Other (See Comments)     Patient reports this abx made her itch uncontrollably       Past Medical History:   Diagnosis Date   • Dysmenorrhea    • Herpes simplex    • Migraines    • Peripheral edema    • UTI (urinary tract infection)        Past Surgical History:   Procedure Laterality Date   • Laparoscopy,tubal cautery  2005    Tubal Ligation   • Removal of tonsils,12+ y/o  2004       Family History   Problem Relation Age of Onset   • Diabetes Mother    • Cancer Mother         cervical   • Hypertension Father    •  Contact your primary OB or after hours at [phone] if you experience any of the following:    Contractions every 7-10 minutes for 1 or more hours.   A sudden gush or constant leaking of fluid.  Heavy vaginal bleeding.   If you experience a constant headache, blurry vision, pain underneath your right rib, or sudden swelling of your hands, feet, and face.   If you are 28 weeks pregnant or greater, you can measure kick counts with a goal of 10 or more movements within 2 hours.     Remember to stay hydrated; drink 8-10 bottles of water a day.     Maintain all follow-up appointments.    Cancer Father         prostate   • Hyperlipidemia Father    • Cancer, Skin Father    • Diabetes Sister    • Diabetes Sister    • Myocardial Infarction Maternal Grandmother    • Congestive Heart Failure Maternal Grandfather    • Dementia/Alzheimers Paternal Grandmother        SOCIAL HISTORY:   Pt. reports that she has never smoked. She has never used smokeless tobacco. She reports current alcohol use. She reports previous drug use.    REVIEW OF SYSTEMS:   GENERAL:  Patient denies fever, chills, tiredness, malaise, weight loss, weight gain.  EYES:  Patient denies blurred vision, double vision, pain, burning and itching.   NEUROLOGIC:  Patient denies tremors, dizzy spells, numbness, tingling, vision change, loss of balance.  ENDOCRINE:  Patient denies excessive thirst, heat intolerance, lymph node enlargement, tired/sluggishness.  GASTROINTESTINAL:  Patient denies abdominal pain, nausea/vomiting, indigestion/heartburn, diarrhea, constipation.  CARDIOVASCULAR:  Patient denies chest pain, shortness of breath, palpitations.  SKIN:  Patient denies skin rashes, boils, persistent itching, acne.  MUSCULOSKELETAL:  Patient denies neck pain, leg swelling.  EARS, NOSE, THROAT:  Patient denies ear infections, sore throats, sinus problems, hearing loss.  GENITOURINARY:  Patient denies urine retention, painful urination, urinary frequency, blood in urine, nocturia.  RESPIRATORY:  Patient denies wheezing, frequent cough, shortness of breath.    LAST MENSTRUAL PERIOD:  2022.  GYN HISTORY:  , AB1.  She states she does have a history of abnormal Pap smears frequently in the past and would like to get that done today.    PHYSICAL EXAM:  VITAL SIGNS:    Visit Vitals  BP (!) 144/92 (BP Location: LUE - Left upper extremity, Patient Position: Sitting, Cuff Size: Large Adult)   Pulse 83   Temp 97.8 °F (36.6 °C) (Temporal)   Resp 20   Ht 5' 8\" (1.727 m)   Wt 101 kg (222 lb 11.2 oz)   LMP 2022   SpO2 99%   BMI 33.86 kg/m²      GENERAL:  A 43 year old female in no acute distress.  CONSTITUTIONAL:  Not ill appearing.  SKIN:  Warm and moist with good color and turgor.  No malignant-appearing rashes or lesions are noted.  Multiple benign-appearing skin tags around her neck.  She has an irregularly shaped area hyperpigmentation on her left inner thigh distally that she states has been there since she was a child and has not changed.  HEENT: Head is symmetrical in shape.  No sores noted.  Eyes: Pupils equal, round and reactive to light.  Extraocular motions are full.  Fundi benign. Pinkish conjunctivae. Nonicteric sclerae. Ears: Tympanic membranes clear, normal. No erythema, no bulging, no retractions. No drainage or cerumen in the canals. No tenderness noted to the outer ears.   Nasal passages are clear. No rhinorrhea. No swelling of the turbinates. Septum midline. Oropharynx: Oral mucosa is moist and pink without pharyngeal erythema or exudate.  Teeth are in good repair. Lips are not dry or cracked.   NECK: Soft and supple. No lymphadenopathy in the anterior or posterior cervical chain or submandibular area. No thyroid enlargement or nodule.   BACK:  No axial deformity, spinous or costovertebral angle tenderness.  LUNGS: Clear to auscultation and resonant to percussion.   CARDIAC: Regular rate and rhythm without murmurs, gallops or rub.   BREASTS: Visually symmetrical. No nipple discharge or skin dimpling.  No masses, or axillary, supraclavicular or infraclavicular adenopathy. No skin changes.  Breast examination taught.  ABDOMEN: Bowel sounds 2+, soft and nontender.  Liver and spleen are not enlarged.  No masses, guarding, rebound or rigidity.    GENITALIA: External genitalia was normal. Cervix is visualized and has a normal appearance.  There is no unusual discharge in the vaginal vault.  Cervix is nontender with motion.  Bimanual exam shows no uterine enlargement.  No adnexal masses or tenderness were appreciated.    RECTAL:   Deferred.  MUSCULOSKELETAL: Full range of motion of all of the major joints, upper and lower extremities.   NEUROLOGIC: Alert and oriented x3. Muscle tone, bulk and strength are good.    ASSESSMENT: Annual physical exam  (primary encounter diagnosis)  Plan: CBC NO DIFFERENTIAL, BASIC METABOLIC PANEL,         CHOLESTEROL, LDL DIRECT, SGOT, SGPT    Chronic right-sided low back pain with right-sided sciatica  Plan: SERVICE TO PHYSICAL THERAPY    Skin tags, multiple acquired  Plan: SERVICE TO DERMATOLOGY    History of abnormal cervical Pap smear  Plan: PAP ORDER    Screen for STD (sexually transmitted disease)  Plan: PAP ORDER, SWABONE VAGINITIS PANEL, HIV         ANTIGEN/ANTIBODY SCREEN, RPR (RAPID PLASMA         REAGIN) TRADITIONAL SYPHILIS SCREEN ALGORITHM,         HEPATITIS SEROLOGY PANEL ACUTE WITH REFLEX HCV         PCR, SWABONE VAGINITIS PANEL    Encounter for screening mammogram for malignant neoplasm of breast  Plan: MAMMO SCREENING W J LUIS BILATERAL    Screen for colon cancer  Plan: OPEN ACCESS COLONOSCOPY    Need for vaccination  Plan: INFLUENZA QUADRIVALENT SPLIT PRES FREE 0.5 ML         VACC, IM (FLULAVAL,FLUARIX,FLUZONE)    PLAN:  1.  Pap smear obtained, do self breast exams regularly, mammogram ordered.  2.  Labs ordered.  3.  Discussed diet and exercise.  4.  Follow up in 1 year, sooner as needed.    Recent Review Flowsheet Data     Date 10/14/2022    Adult PHQ 2 Score 0    Adult PHQ 2 Interpretation No further screening needed    Little interest or pleasure in activity? Not at all    Feeling down, depressed or hopeless? Not at all          DEPRESSION ASSESSMENT/PLAN:  Depression screening is negative no further plan needed.    BMI ASSESSMENT/PLAN:  Patient is obese.    Recommend healthy diet and exercise.  Offered a referral to nutritionist but this was declined.       Supervising physician:   Dr. Se Nur

## 2024-05-22 ENCOUNTER — ROUTINE PRENATAL (OUTPATIENT)
Dept: OBSTETRICS AND GYNECOLOGY | Facility: CLINIC | Age: 24
End: 2024-05-22
Payer: COMMERCIAL

## 2024-05-22 ENCOUNTER — TELEPHONE (OUTPATIENT)
Dept: OBSTETRICS AND GYNECOLOGY | Facility: CLINIC | Age: 24
End: 2024-05-22
Payer: COMMERCIAL

## 2024-05-22 ENCOUNTER — TELEPHONE (OUTPATIENT)
Dept: OBSTETRICS AND GYNECOLOGY | Facility: CLINIC | Age: 24
End: 2024-05-22

## 2024-05-22 VITALS — BODY MASS INDEX: 35.8 KG/M2 | DIASTOLIC BLOOD PRESSURE: 84 MMHG | WEIGHT: 208.56 LBS | SYSTOLIC BLOOD PRESSURE: 138 MMHG

## 2024-05-22 DIAGNOSIS — O26.899 RH NEGATIVE STATE IN ANTEPARTUM PERIOD: ICD-10-CM

## 2024-05-22 DIAGNOSIS — Z3A.28 28 WEEKS GESTATION OF PREGNANCY: Primary | ICD-10-CM

## 2024-05-22 DIAGNOSIS — Z67.91 RH NEGATIVE STATE IN ANTEPARTUM PERIOD: ICD-10-CM

## 2024-05-22 PROCEDURE — 99999 PR PBB SHADOW E&M-EST. PATIENT-LVL III: CPT | Mod: PBBFAC,,, | Performed by: OBSTETRICS & GYNECOLOGY

## 2024-05-22 PROCEDURE — 0502F SUBSEQUENT PRENATAL CARE: CPT | Mod: CPTII,S$GLB,, | Performed by: OBSTETRICS & GYNECOLOGY

## 2024-05-22 RX ORDER — ASPIRIN 81 MG/1
81 TABLET ORAL DAILY
COMMUNITY
Start: 2024-05-22 | End: 2025-02-26

## 2024-05-22 NOTE — PROGRESS NOTES
Pregnancy dating, labs, ultrasound reports, prenatal testing, and problem list; prior records and results; and available outside records were reviewed and updated in EMR.  Pertinent findings were noted below.    Reason for Visit   Routine Prenatal Visit    HPI   24 y.o., at 28w6d by Estimated Date of Delivery: 24    She is doing well.      Contractions: No   Bleeding: No   Loss of fluid: No   Fetal movement: Yes   Nausea: No   Vomiting: No   Headache: No     Exam   /84   Wt 94.6 kg (208 lb 8.9 oz)   LMP 10/28/2023 (Exact Date)   BMI 35.80 kg/m²     GENERAL: No acute distress  ABD: Gravid    Assessment and Plan   28 weeks gestation of pregnancy  -     aspirin (ECOTRIN) 81 MG EC tablet; Take 1 tablet (81 mg total) by mouth once daily.    Rh negative state in antepartum period    She will need RHOGAM/ TDAP  Monitor BP, PRE E sx     labor and preE precautions given  Follow-up: 2 weeks

## 2024-05-23 NOTE — TELEPHONE ENCOUNTER
Called patient. No answer. Left voice message for patient to call the office.      Needs Tdap and Rhogam scheduled.

## 2024-05-30 ENCOUNTER — PATIENT MESSAGE (OUTPATIENT)
Dept: OTHER | Facility: OTHER | Age: 24
End: 2024-05-30
Payer: COMMERCIAL

## 2024-06-02 ENCOUNTER — PATIENT MESSAGE (OUTPATIENT)
Dept: OBSTETRICS AND GYNECOLOGY | Facility: CLINIC | Age: 24
End: 2024-06-02
Payer: COMMERCIAL

## 2024-06-02 DIAGNOSIS — Z3A.30 30 WEEKS GESTATION OF PREGNANCY: Primary | ICD-10-CM

## 2024-06-13 ENCOUNTER — PATIENT MESSAGE (OUTPATIENT)
Dept: OTHER | Facility: OTHER | Age: 24
End: 2024-06-13
Payer: COMMERCIAL

## 2024-06-16 ENCOUNTER — TELEPHONE (OUTPATIENT)
Dept: OBSTETRICS AND GYNECOLOGY | Facility: OTHER | Age: 24
End: 2024-06-16
Payer: COMMERCIAL

## 2024-06-17 ENCOUNTER — TELEPHONE (OUTPATIENT)
Dept: OBSTETRICS AND GYNECOLOGY | Facility: OTHER | Age: 24
End: 2024-06-17
Payer: COMMERCIAL

## 2024-06-17 ENCOUNTER — TELEPHONE (OUTPATIENT)
Dept: OBSTETRICS AND GYNECOLOGY | Facility: CLINIC | Age: 24
End: 2024-06-17
Payer: COMMERCIAL

## 2024-06-17 NOTE — TELEPHONE ENCOUNTER
----- Message from Karla Robert sent at 6/17/2024 11:34 AM CDT -----      Name of Who is Calling: CARY LEVIN [[id number]]      What is the request in detail: Mom called to report pt has covid and would like to know what to do.Please contact to further discuss and advise.          Can the clinic reply by [facility]: Y      What Number to Call Back if not in [facility]: Mom [phone]

## 2024-06-18 ENCOUNTER — OFFICE VISIT (OUTPATIENT)
Dept: PSYCHIATRY | Facility: CLINIC | Age: 24
End: 2024-06-18
Payer: COMMERCIAL

## 2024-06-18 DIAGNOSIS — F90.2 ATTENTION DEFICIT HYPERACTIVITY DISORDER (ADHD), COMBINED TYPE: ICD-10-CM

## 2024-06-18 DIAGNOSIS — F43.23 ADJUSTMENT DISORDER WITH MIXED ANXIETY AND DEPRESSED MOOD: ICD-10-CM

## 2024-06-18 DIAGNOSIS — F41.1 GAD (GENERALIZED ANXIETY DISORDER): Primary | ICD-10-CM

## 2024-06-18 PROCEDURE — 99214 OFFICE O/P EST MOD 30 MIN: CPT | Mod: 95,,, | Performed by: PHYSICIAN ASSISTANT

## 2024-06-18 PROCEDURE — 1160F RVW MEDS BY RX/DR IN RCRD: CPT | Mod: CPTII,95,, | Performed by: PHYSICIAN ASSISTANT

## 2024-06-18 PROCEDURE — 1159F MED LIST DOCD IN RCRD: CPT | Mod: CPTII,95,, | Performed by: PHYSICIAN ASSISTANT

## 2024-06-18 PROCEDURE — G2211 COMPLEX E/M VISIT ADD ON: HCPCS | Mod: 95,,, | Performed by: PHYSICIAN ASSISTANT

## 2024-06-18 RX ORDER — SERTRALINE HYDROCHLORIDE 25 MG/1
25 TABLET, FILM COATED ORAL DAILY
Qty: 30 TABLET | Refills: 2 | Status: SHIPPED | OUTPATIENT
Start: 2024-06-18 | End: 2024-09-16

## 2024-06-18 NOTE — PROGRESS NOTES
"The patient location is: at home in [place], [place]  The chief complaint leading to consultation is: dep/anxiety    Visit type: audiovisual    Face to Face time with patient: 15  30 minutes of total time spent on the encounter, which includes face to face time and non-face to face time preparing to see the patient (eg, review of tests), Obtaining and/or reviewing separately obtained history, Documenting clinical information in the electronic or other health record, Independently interpreting results (not separately reported) and communicating results to the patient/family/caregiver, or Care coordination (not separately reported).     Each patient to whom he or she provides medical services by telemedicine is:  (1) informed of the relationship between the physician and patient and the respective role of any other health care provider with respect to management of the patient; and (2) notified that he or she may decline to receive medical services by telemedicine and may withdraw from such care at any time.    Outpatient Psychiatry Follow-Up Visit (MD/NP)    6/18/2024    Clinical Status of Patient:  Outpatient (Ambulatory)    Chief Complaint:  Almas Chowdhury Isaiah is a 24 y.o. female who presents today for follow-up of depression, anxiety and attention problems.  Met with patient.     Interval History and Content of Current Session:  Interim Events/Subjective Report/Content of Current Session:  Almas is seen today for medication follow up.  Patient is seen virtually.  She is mostly doing well although recently come down with COVID.  Started having symptoms a couple of days ago and has mostly just been resting.  Started iron supplementation and feels that her energy level has improved.  Other than this, she has just been working and growing a baby".  Due date is between August 3rd and August 8th.  Sertraline continues to support mood and anxiety and she would like to continue with low-dose sertraline 25 mg once " daily.  Denies suicidal or homicidal ideation.  Denies mood euphoria or hallucinations.  No other complaints today.    FROM PREVIOUS HPI  Almas is seen virtually today for medication follow up. Doing well - found out sex of baby. It's a girl. Her and her boyfriend are excited about having a girl. Having some waves of nausea but doing overall well.  Would like to continue sertraline as prescribed.  Denies suicidal or homicidal ideation.  No other complaints today.        6/18/2024    10:59 AM 4/23/2024    10:07 AM 3/19/2024    11:14 AM   GAD7   1. Feeling nervous, anxious, or on edge? 0 0 0   2. Not being able to stop or control worrying? 0 0 0   3. Worrying too much about different things? 0 0 0   4. Trouble relaxing? 0 0 0   5. Being so restless that it is hard to sit still? 0 0 0   6. Becoming easily annoyed or irritable? 0 0 0   7. Feeling afraid as if something awful might happen? 0 0 0   MATHEW-7 Score 0 0 0     Outpatient Psychiatry Initial Visit (MD/NP)     1/28/2021     Almas Colon, a 20 y.o. female, presenting for initial evaluation visit. Met with patient.     Reason for Encounter: Referral from Dina Thayer NP. Patient complains of inattention/concentration.     History of Present Illness:   This is a healthy 20-year-old female, past medical history of well-controlled asthma, who presents for initial evaluation today.  She reports that she has been prescribed Vyvanse 20 mg daily for attention/concentration.  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  Has been on winter break so has not been taking the Vyvanse.  She does report that it has helped her mood, attention, focus, complete schoolwork as well as daily tasks.  She is considering antidepressant therapy but is not sure if that is necessary at this point.  She does start school next week and will see how she is doing on Vyvanse 40 mg daily and go from there.  She is unsure if she wants to stay on stimulant long-term.   Discussed Strattera for nonstimulant treatment of ADHD.  She will consider this medication as well.  Denies suicidal or homicidal ideation.  Appetite and sleep are stable.  Has supportive family and friends.     She states her mood has been stable.  She is entering her senior year of college.  Was going to graduate May 2021 but is pushing that back to December 2021.  She is looking for engineering internships.  Program is challenging but she enjoys it.  Also established with Jennifer Jackson LCSW for psychotherapy.  Financial situation is a bit stressed.  Living with her mother.  Has been attempting to increase physical activity which does cause her to feel better.  Patient describes that she was on sertraline years ago and did well with that but it did cause her to have some questionable decision making.     Depression symptoms: patient reports little interest or pleasure in doing things; feeling down, depressed, or hopeless; trouble falling asleep or staying asleep, or sleeping too much; feeling tired or having little energy; poor appetite/overeating; trouble concentrating. Denies thoughts of self-harm or suicide.     Anxiety symptoms: reports feeling nervous, anxious, or on edge; not being able to stop or control worrying; worrying too much about different things; becoming easily annoyed or irritable.     Natalie/Hypomania symptoms: denies periods of time in which she has gone without sleep with elevated or irritable mood and impulsive decision making.  Mood disorder questionnaire, she does admit to irritability, racing thoughts, distractibility at times more social or outgoing.     Psychosis: denies     Attention/Concentration:  Improved with Vyvanse     Body Image/Hx of eating disorders: denies     Suicidal ideation and risk: denies, no history of suicide attempts     Homicidal/Violient ideation and risk: denies     Sleep: not sleeping that well, trying to get back into sleep schedule, used to take melatonin       Appetite:  Adequate     GAD7:  Five, somewhat difficult  PHQ9:  13, extremely difficult  MDQ:  Negative screen     Past Psychiatric History:  Prior diagnoses: ADHD, depressive symptoms in the past     Inpatient psychiatric treatment:  No history of inpatient or intensive outpatient     Outpatient psychiatric treatment:  Primary care provider     Prior medications: zoloft, wellbutrin, vyvanse     Current medications: vyvanse     Prior suicide attempts:  None     Prior history self harm:  No history of self-injurious behavior     Prior psychotherapy:  Currently involved with Jennifer Jackson LCSW        Allergies:       Review of patient's allergies indicates:   Allergen Reactions    Ceftriaxone Hives    Cefzil [cefprozil] Hives         Past Medical History:       Past Medical History:   Diagnosis Date    Acne      Asthma      Depression      Kidney stone      Seasonal allergic rhinitis 5/30/2018   G0     History TBI: denies  History seizures: denies     Past Surgical History:        Past Surgical History:   Procedure Laterality Date    TONSILLECTOMY             Family History:   Suicide: denies  Substance use: denies  Bipolar disorder/Psychotic disorder: denies  Anxiety: mother  Depression: mother     Social History:  Childhood: born in [place], raised by biological mother and father. Living with mom now. Father is staying with her grandmother. Establishing a relationship with her father. Two half sisters and step-sister. Doing well. Good relationship.  Marital status: supportive boyfriend  Children: none  Resides: [place]  Occupation: not working right now, going to try find part time job  Hobbies: trying to find a hobby  Mandaeism: none  Education level: senior in college  : denies  Legal: denies  History of abuse/trauma: a bit of emotional trauma, working through in therapy     Substance History:  Tobacco: denies  Alcohol: occasionally, rarely  Drug use: denies  Caffeine: cup a day of coffee       Rehab:  Prior/current AA: denies    Review of Systems   PSYCHIATRIC: Pertinant items are noted in the narrative.  RESPIRATORY: No shortness of breath.  CARDIOVASCULAR: No tachycardia or chest pain.  GASTROINTESTINAL: No nausea, vomiting, pain, constipation or diarrhea.    Past Medical, Family and Social History: The patient's past medical, family and social history have been reviewed and updated as appropriate within the electronic medical record - see encounter notes.    Compliance: yes    Side effects: none    Risk Parameters:  Patient reports no suicidal ideation  Patient reports no homicidal ideation  Patient reports no self-injurious behavior  Patient reports no violent behavior    Exam (detailed: at least 9 elements; comprehensive: all 15 elements)   Constitutional  Vitals:  Most recent vital signs, dated less than 90 days prior to this appointment, were reviewed.   There were no vitals filed for this visit.       General:  unremarkable, age appropriate, casually dressed     Musculoskeletal  Muscle Strength/Tone:  no dyskinesia   Gait & Station:  Virtual visit     Psychiatric  Speech:  no latency; no press   Mood & Affect:  good  congruent and appropriate   Thought Process:  normal and logical   Associations:  intact   Thought Content:  normal, no suicidality, no homicidality, delusions, or paranoia   Insight:  intact   Judgement: behavior is adequate to circumstances   Orientation:  grossly intact   Memory: intact for content of interview   Language: grossly intact   Attention Span & Concentration:  able to focus   Fund of Knowledge:  intact and appropriate to age and level of education     Assessment and Diagnosis   Status/Progress: Based on the examination today, the patient's problem(s) is/are improved.  New problems have not been presented today.   Co-morbidities, Diagnostic uncertainty and Lack of compliance are not complicating management of the primary condition.     General Impression:   Adjustment  disorder  ADHD, combined presentation type  Recent confirmed pregnancy    Intervention/Counseling/Treatment Plan   Medication Management: Continue current medications. The risks and benefits of medication were discussed with the patient.  Counseling provided with patient as follows: importance of compliance with chosen treatment options was emphasized, risks and benefits of treatment options, including medications, were discussed with the patient, risk factor reduction, prognosis    Almas is seen today for medication follow up.  Based on assessment:    Continue sertraline 25 mg once daily for mood/anxiety. Will have very close f/u with this patient.     Please go to emergency department if feeling as though you are a harm to yourself or others or if you are in crisis.     Please call the clinic to report any worsening of symptoms or problems associated with medication.    This medication has US boxed warning that states there may be an increased risk of suicidal thoughts and behavior in pediatric and young adult patients in short-term studies. Closely monitor all antidepressant-treated patients for clinical worsening, and for emergence of suicidal thoughts and behaviors.    Return to Clinic: as scheduled, as needed

## 2024-06-20 ENCOUNTER — TELEPHONE (OUTPATIENT)
Dept: OBSTETRICS AND GYNECOLOGY | Facility: CLINIC | Age: 24
End: 2024-06-20
Payer: COMMERCIAL

## 2024-06-20 NOTE — TELEPHONE ENCOUNTER
Reached out to pt to reschedule [facility] appointment. Pt stated she is starting to feel better but is still dealing with some congestion and fatigue. Found good date and time with RAMBO Sinha, pt stated she would rather go to the [facility] location. Pt verbalized understanding.     ----- Message from Karla Robert sent at 6/20/2024  2:40 PM CDT -----      Name of Who is Calling: CARY LEVIN [[id number]]      What is the request in detail: Pt called said she has covid and can't make her appt.Please contact to further discuss and advise.          Can the clinic reply by [facility]: Y      [facility]:  [phone]

## 2024-06-21 ENCOUNTER — TELEPHONE (OUTPATIENT)
Dept: OBSTETRICS AND GYNECOLOGY | Facility: CLINIC | Age: 24
End: 2024-06-21
Payer: COMMERCIAL

## 2024-06-21 DIAGNOSIS — Z67.91 RH NEGATIVE STATE IN ANTEPARTUM PERIOD: Primary | ICD-10-CM

## 2024-06-21 DIAGNOSIS — O26.899 RH NEGATIVE STATE IN ANTEPARTUM PERIOD: Primary | ICD-10-CM

## 2024-06-24 ENCOUNTER — ROUTINE PRENATAL (OUTPATIENT)
Dept: OBSTETRICS AND GYNECOLOGY | Facility: CLINIC | Age: 24
End: 2024-06-24
Payer: COMMERCIAL

## 2024-06-24 VITALS
DIASTOLIC BLOOD PRESSURE: 84 MMHG | SYSTOLIC BLOOD PRESSURE: 116 MMHG | BODY MASS INDEX: 35.61 KG/M2 | WEIGHT: 207.44 LBS

## 2024-06-24 DIAGNOSIS — Z34.00 SUPERVISION OF NORMAL FIRST PREGNANCY, ANTEPARTUM: Primary | ICD-10-CM

## 2024-06-24 DIAGNOSIS — O26.86 PUPP (PRURITIC URTICARIAL PAPULES AND PLAQUES OF PREGNANCY): ICD-10-CM

## 2024-06-24 PROCEDURE — 99999 PR PBB SHADOW E&M-EST. PATIENT-LVL III: CPT | Mod: PBBFAC,,, | Performed by: NURSE PRACTITIONER

## 2024-06-24 PROCEDURE — 0502F SUBSEQUENT PRENATAL CARE: CPT | Mod: CPTII,S$GLB,, | Performed by: NURSE PRACTITIONER

## 2024-06-24 RX ORDER — BETAMETHASONE DIPROPIONATE 0.5 MG/ML
LOTION, AUGMENTED TOPICAL 2 TIMES DAILY
Qty: 60 ML | Refills: 1 | Status: SHIPPED | OUTPATIENT
Start: 2024-06-24

## 2024-06-24 NOTE — PROGRESS NOTES
Here for routine OB appt at 33w4d, with complaints of itching to her abdomen.  Discussed suspect PUPPS (Pruritic Urticarial Papules and Plaques of Pregnancy- steroid lotion (Betamethasone dipropionate) sent apply twice daily for 10 days.       Reports good FM.  Denies LOF, denies VB, denies contractions.  Reviewed warning signs of Labor and Preeclampsia.  Daily FM counts reinforced.  Rhogam and Tdap scheduled.   Discussed pediatrician, Plans to breastfeed- will order pump.   F/U scheduled 2 weeks

## 2024-06-27 ENCOUNTER — PATIENT MESSAGE (OUTPATIENT)
Dept: OBSTETRICS AND GYNECOLOGY | Facility: CLINIC | Age: 24
End: 2024-06-27
Payer: COMMERCIAL

## 2024-07-02 ENCOUNTER — OFFICE VISIT (OUTPATIENT)
Dept: PEDIATRICS | Facility: CLINIC | Age: 24
End: 2024-07-02
Payer: COMMERCIAL

## 2024-07-02 DIAGNOSIS — Z34.93 PRENATAL CARE IN THIRD TRIMESTER: Primary | ICD-10-CM

## 2024-07-02 PROCEDURE — 99999 PR PBB SHADOW E&M-EST. PATIENT-LVL I: CPT | Mod: PBBFAC,,, | Performed by: STUDENT IN AN ORGANIZED HEALTH CARE EDUCATION/TRAINING PROGRAM

## 2024-07-02 PROCEDURE — 99499 UNLISTED E&M SERVICE: CPT | Mod: S$GLB,,, | Performed by: STUDENT IN AN ORGANIZED HEALTH CARE EDUCATION/TRAINING PROGRAM

## 2024-07-02 NOTE — PROGRESS NOTES
Prenatal Pediatric Visit    24 year old  female here for prospective pediatrician visit. Doing well at this time. No concerns. Currently at 34.5 WGA. Discussed well visit schedule and answered any questions patient had.      Moisés Cheng MD

## 2024-07-04 ENCOUNTER — PATIENT MESSAGE (OUTPATIENT)
Dept: OTHER | Facility: OTHER | Age: 24
End: 2024-07-04
Payer: COMMERCIAL

## 2024-07-05 ENCOUNTER — CLINICAL SUPPORT (OUTPATIENT)
Dept: OBSTETRICS AND GYNECOLOGY | Facility: CLINIC | Age: 24
End: 2024-07-05
Payer: COMMERCIAL

## 2024-07-05 DIAGNOSIS — Z67.91 RH NEGATIVE STATE IN ANTEPARTUM PERIOD: Primary | ICD-10-CM

## 2024-07-05 DIAGNOSIS — O26.899 RH NEGATIVE STATE IN ANTEPARTUM PERIOD: Primary | ICD-10-CM

## 2024-07-05 PROCEDURE — 99999 PR PBB SHADOW E&M-EST. PATIENT-LVL I: CPT | Mod: PBBFAC,,,

## 2024-07-05 NOTE — PROGRESS NOTES
Here for rhogam injection, no complaints at this time verified patient is Rh negative. No complaints of pain prior to or post injection patient advised to wait 15 minutes before leaving and to report and adverse reactions.      Site: rb

## 2024-07-08 ENCOUNTER — TELEPHONE (OUTPATIENT)
Dept: OBSTETRICS AND GYNECOLOGY | Facility: CLINIC | Age: 24
End: 2024-07-08
Payer: COMMERCIAL

## 2024-07-08 ENCOUNTER — HOSPITAL ENCOUNTER (EMERGENCY)
Facility: OTHER | Age: 24
Discharge: HOME | End: 2024-07-08
Attending: OBSTETRICS & GYNECOLOGY
Payer: COMMERCIAL

## 2024-07-08 VITALS
SYSTOLIC BLOOD PRESSURE: 117 MMHG | OXYGEN SATURATION: 98 % | TEMPERATURE: 98 F | HEART RATE: 95 BPM | RESPIRATION RATE: 18 BRPM | DIASTOLIC BLOOD PRESSURE: 70 MMHG

## 2024-07-08 DIAGNOSIS — E16.2 HYPOGLYCEMIA: ICD-10-CM

## 2024-07-08 DIAGNOSIS — R53.1 WEAKNESS: Primary | ICD-10-CM

## 2024-07-08 DIAGNOSIS — R42 DIZZY: ICD-10-CM

## 2024-07-08 DIAGNOSIS — Z3A.35 35 WEEKS GESTATION OF PREGNANCY: ICD-10-CM

## 2024-07-08 LAB
BACTERIA #/AREA URNS HPF: NORMAL /HPF
BASOPHILS NFR BLD: 0 % (ref 0–1.9)
BILIRUB SERPL-MCNC: NORMAL MG/DL
BILIRUB UR QL STRIP: NEGATIVE
BLOOD URINE, POC: NORMAL
CLARITY UR: CLEAR
COLOR UR: COLORLESS
COLOR, POC UA: YELLOW
DIFFERENTIAL METHOD BLD: ABNORMAL
EOSINOPHIL NFR BLD: 2 % (ref 0–8)
ERYTHROCYTE [DISTWIDTH] IN BLOOD BY AUTOMATED COUNT: 13.8 % (ref 11.5–14.5)
GLUCOSE UR QL STRIP: NEGATIVE
GLUCOSE UR QL STRIP: NORMAL
HCT VFR BLD AUTO: 31.8 % (ref 37–48.5)
HGB BLD-MCNC: 11.3 G/DL (ref 12–16)
HGB UR QL STRIP: ABNORMAL
IMM GRANULOCYTES # BLD AUTO: ABNORMAL K/UL (ref 0–0.04)
IMM GRANULOCYTES NFR BLD AUTO: ABNORMAL % (ref 0–0.5)
KETONES UR QL STRIP: NEGATIVE
KETONES UR QL STRIP: NORMAL
LEUKOCYTE ESTERASE UR QL STRIP: ABNORMAL
LEUKOCYTE ESTERASE URINE, POC: NORMAL
LYMPHOCYTES NFR BLD: 12 % (ref 18–48)
MCH RBC QN AUTO: 31 PG (ref 27–31)
MCHC RBC AUTO-ENTMCNC: 35.5 G/DL (ref 32–36)
MCV RBC AUTO: 87 FL (ref 82–98)
METAMYELOCYTES NFR BLD MANUAL: 1 %
MICROSCOPIC COMMENT: NORMAL
MONOCYTES NFR BLD: 5 % (ref 4–15)
NEUTROPHILS NFR BLD: 80 % (ref 38–73)
NITRITE UR QL STRIP: NEGATIVE
NITRITE, POC UA: NORMAL
NRBC BLD-RTO: 0 /100 WBC
PH UR STRIP: 7 [PH] (ref 5–8)
PH, POC UA: 5
PLATELET # BLD AUTO: 205 K/UL (ref 150–450)
PLATELET BLD QL SMEAR: ABNORMAL
PMV BLD AUTO: 9.4 FL (ref 9.2–12.9)
POCT GLUCOSE: 130 MG/DL (ref 70–110)
POCT GLUCOSE: 53 MG/DL (ref 70–110)
POCT GLUCOSE: 60 MG/DL (ref 70–110)
PROT UR QL STRIP: NEGATIVE
PROTEIN, POC: NORMAL
RBC # BLD AUTO: 3.64 M/UL (ref 4–5.4)
RBC #/AREA URNS HPF: 2 /HPF (ref 0–4)
SP GR UR STRIP: 1 (ref 1–1.03)
SPECIFIC GRAVITY, POC UA: 1.01
SQUAMOUS #/AREA URNS HPF: 3 /HPF
URN SPEC COLLECT METH UR: ABNORMAL
UROBILINOGEN UR STRIP-ACNC: NEGATIVE EU/DL
UROBILINOGEN, POC UA: NORMAL
WBC # BLD AUTO: 10.14 K/UL (ref 3.9–12.7)
WBC #/AREA URNS HPF: 4 /HPF (ref 0–5)

## 2024-07-08 PROCEDURE — 85027 COMPLETE CBC AUTOMATED: CPT

## 2024-07-08 PROCEDURE — 99284 EMERGENCY DEPT VISIT MOD MDM: CPT | Mod: 25

## 2024-07-08 PROCEDURE — 85007 BL SMEAR W/DIFF WBC COUNT: CPT

## 2024-07-08 PROCEDURE — 93005 ELECTROCARDIOGRAM TRACING: CPT

## 2024-07-08 PROCEDURE — 81002 URINALYSIS NONAUTO W/O SCOPE: CPT

## 2024-07-08 PROCEDURE — 59025 FETAL NON-STRESS TEST: CPT | Mod: 26,,, | Performed by: OBSTETRICS & GYNECOLOGY

## 2024-07-08 PROCEDURE — 93010 ELECTROCARDIOGRAM REPORT: CPT | Mod: ,,, | Performed by: INTERNAL MEDICINE

## 2024-07-08 PROCEDURE — 99284 EMERGENCY DEPT VISIT MOD MDM: CPT | Mod: 25,,, | Performed by: OBSTETRICS & GYNECOLOGY

## 2024-07-08 PROCEDURE — 82962 GLUCOSE BLOOD TEST: CPT

## 2024-07-08 PROCEDURE — 81000 URINALYSIS NONAUTO W/SCOPE: CPT

## 2024-07-08 PROCEDURE — 59025 FETAL NON-STRESS TEST: CPT

## 2024-07-08 NOTE — DISCHARGE INSTRUCTIONS
Contact your primary OB or after hours at [phone] if you experience any of the following:    Contractions every 7-10 minutes for 1 or more hours.   A sudden gush or constant leaking of fluid.  Heavy vaginal bleeding.   If you experience a constant headache, blurry vision, pain underneath your right rib, or sudden swelling of your hands, feet, and face.   If you are 28 weeks pregnant or greater, you can measure kick counts with a goal of 10 or more movements within 2 hours.     Remember to stay hydrated; drink 8-10 bottles of water a day.     Maintain all follow-up appointments.

## 2024-07-08 NOTE — ED PROVIDER NOTES
Encounter Date: 2024       History     Chief Complaint   Patient presents with    Dizziness    Weakness     Almas Colon is a 24 y.o.  at 35w4d who presents to the OB ED today (2024) with CC of bilateral weakness. She reports mild confusion this morning which resolved after she drank a Pedialyte and ate some grapes. She denies any headache, visual changes, presyncope, palpitations, SOB, CP, FC, NVD, dysuria. She also denies any focal weakness.   She reports no vaginal bleeding, no leakage of fluid, and no contractions.   She reports good fetal movement.  This pregnancy is complicated by anemia, anxiety/depression, asthma.            Review of patient's allergies indicates:   Allergen Reactions    Cefzil [cefprozil] Hives    Rocephin [ceftriaxone] Hives     Past Medical History:   Diagnosis Date    Acne     Anxiety     Asthma     Depression     Seasonal allergic rhinitis 2018     Past Surgical History:   Procedure Laterality Date    TONSILLECTOMY       Family History   Problem Relation Name Age of Onset    Glaucoma Paternal Grandmother      Diabetes Maternal Grandmother      Acne Father Stas     Acne Mother Livia     Cataracts Neg Hx      Macular degeneration Neg Hx      Retinal detachment Neg Hx      Uterine cancer Neg Hx      Breast cancer Neg Hx      Ovarian cancer Neg Hx      Cervical cancer Neg Hx       Social History     Tobacco Use    Smoking status: Never    Smokeless tobacco: Never   Substance Use Topics    Alcohol use: Not Currently     Comment: Occasionally. Maybe once a month or every few months    Drug use: No     Review of Systems   Constitutional:  Negative for chills and fever.   HENT:  Negative for congestion and sore throat.    Eyes:  Negative for visual disturbance.   Respiratory:  Negative for cough and shortness of breath.    Cardiovascular:  Negative for chest pain and leg swelling.   Gastrointestinal:  Negative for abdominal pain, nausea and vomiting.    Genitourinary:  Negative for dysuria and vaginal bleeding.   Musculoskeletal:  Negative for back pain.   Skin:  Negative for rash.   Neurological:  Positive for dizziness and weakness. Negative for light-headedness and headaches.   Hematological:  Does not bruise/bleed easily.   Psychiatric/Behavioral: Negative.     All other systems reviewed and are negative.      Physical Exam     Initial Vitals   BP Pulse Resp Temp SpO2   07/08/24 1210 07/08/24 1210 07/08/24 1210 07/08/24 1210 07/08/24 1215   136/86 94 18 97.9 °F (36.6 °C) 97 %      MAP       --                Physical Exam    Vitals reviewed.  Constitutional: She appears well-developed and well-nourished. She is not diaphoretic. No distress.   HENT:   Head: Normocephalic and atraumatic.   Eyes: Pupils are equal, round, and reactive to light.   Cardiovascular:  Normal rate, regular rhythm and normal heart sounds.           Pulmonary/Chest: Breath sounds normal.   Abdominal: Abdomen is soft. There is no abdominal tenderness. There is no rebound.   Musculoskeletal:         General: Normal range of motion.     Neurological: She is alert and oriented to person, place, and time. She has normal strength and normal reflexes. No cranial nerve deficit or sensory deficit.   Skin: Skin is warm and dry.   Psychiatric: She has a normal mood and affect.         ED Course   Obtain Fetal nonstress test (NST)    Date/Time: 7/8/2024 3:13 PM    Performed by: Sapna Young MD  Authorized by: Valerie Segal MD    Nonstress Test:     Variability:  6-25 BPM    Decelerations:  None    Accelerations:  15 bpm    Acoustic Stimulator: No      Baseline:  125    Uterine Irritability: Yes      Contractions:  Not present  Biophysical Profile:     Nonstress Test Interpretation: reactive      Overall Impression:  Reassuring    Labs Reviewed   CBC W/ AUTO DIFFERENTIAL - Abnormal       Result Value    WBC 10.14      RBC 3.64 (*)     Hemoglobin 11.3 (*)     Hematocrit 31.8 (*)     MCV 87       MCH 31.0      MCHC 35.5      RDW 13.8      Platelets 205      MPV 9.4      Immature Granulocytes CANCELED      Immature Grans (Abs) CANCELED      nRBC 0      Gran % 80.0 (*)     Lymph % 12.0 (*)     Mono % 5.0      Eosinophil % 2.0      Basophil % 0.0      Metamyelocytes 1.0      Platelet Estimate Appears normal      Differential Method Manual     URINALYSIS, REFLEX TO URINE CULTURE - Abnormal    Specimen UA Urine, Clean Catch      Color, UA Colorless (*)     Appearance, UA Clear      pH, UA 7.0      Specific Gravity, UA 1.005      Protein, UA Negative      Glucose, UA Negative      Ketones, UA Negative      Bilirubin (UA) Negative      Occult Blood UA 2+ (*)     Nitrite, UA Negative      Urobilinogen, UA Negative      Leukocytes, UA 2+ (*)     Narrative:     Specimen Source->Urine   POCT GLUCOSE - Abnormal    POCT Glucose 60 (*)    POCT GLUCOSE - Abnormal    POCT Glucose 53 (*)    POCT GLUCOSE - Abnormal    POCT Glucose 130 (*)    URINALYSIS MICROSCOPIC    RBC, UA 2      WBC, UA 4      Bacteria Rare      Squam Epithel, UA 3      Microscopic Comment SEE COMMENT      Narrative:     Specimen Source->Urine   POCT URINALYSIS, DIPSTICK OR TABLET REAGENT, AUTOMATED, WITH MICROSCOP    Color, UA Yellow      Spec Grav UA 1.010      pH, UA 5      WBC, UA ++      Nitrite, UA neg      Protein, POC neg      Glucose, UA norm      Ketones, UA neg      Urobilinogen, UA norm      Bilirubin, POC neg      Blood, UA about 50       EKG Readings: (Independently Interpreted)   Rhythm: Normal Sinus Rhythm.     ECG Results              EKG 12-lead (In process)        Collection Time Result Time QRS Duration OHS QTC Calculation    07/08/24 12:57:36 07/20/24 11:49:47 84 418                     In process by Interface, Lab In [facility] (07/20/24 11:49:58)                   Narrative:    Test Reason : R53.1,    Vent. Rate : 091 BPM     Atrial Rate : 091 BPM     P-R Int : 124 ms          QRS Dur : 084 ms      QT Int : 340 ms       P-R-T Axes :  038 062 -03 degrees     QTc Int : 418 ms    Normal sinus rhythm  Right ventricular conduction delay  Abnormal QRS-T angle, consider primary T wave abnormality  Abnormal ECG  No previous ECGs available    Referred By: [email or web address]   SELF           Confirmed By:                                      EKG 12-lead (Final result)  Result time 24 11:21:24      Final result by Unknown User (24 11:21:24)                                      Imaging Results    None          Medications - No data to display  Medical Decision Making  24 y.o.  at 35w4d who presents to the OB ED today (2024) with CC of bilateral weakness.     NST: reactive and reassuring  [place]: irritability, not ignacio    Temp:  [97.9 °F (36.6 °C)] 97.9 °F (36.6 °C)  Pulse:  [] 95  Resp:  [18] 18  SpO2:  [95 %-100 %] 98 %  BP: (115-139)/(70-88) 117/70     Weakness:   - No focal neurological deficits  - Weakness does not limit daily activities  - Udip: Spec Grav 1.010, ++ WBC, blood about 50, otherwise WNL  - UA MCS : 2+ leukocytes, 2+ occult blood, 2 RBC, 4 WBC, rare bacteria  - orthostatic vitals  - CBC: H/H 11.3/31.8  - POCT glucose: 60 > 53 > 130  - Outpatient Cardiology Consult placed to r/o arrhythmia as cause of weakness and dizziness  - Educated patient about diet, including frequent meals    Anemia  - Continue Fe/Colace    Anxiety/Depression  - Continue home Zoloft 25 mg    Sapna Young MD  Obstetrics & Gynecology, PGY-1     Amount and/or Complexity of Data Reviewed  Labs: ordered.              Attending Attestation:   Physician Attestation Statement for Resident:  As the supervising MD   Physician Attestation Statement: I have personally seen and examined this patient.   I agree with the above history.  -:   As the supervising MD I agree with the above PE.     As the supervising MD I agree with the above treatment, course, plan, and disposition.   -: NST reactive and reassuring, toco with no contractions.  Vital signs  normal, neuro exam with no focal deficits.  See ED course.  EKG normal.  Hypoglycemia noted.  Symptoms improved with eating.  Discussed more frequent meals with increase in protein.  I was personally present during the critical portions of the procedure(s) performed by the resident and was immediately available in the ED to provide services and assistance as needed during the entire procedure.   I have reviewed the following: old records at this facility.                ED Course as of 24 [id number]      1306 Patient is a 24-year-old  1 at 35 weeks and 4 days who presents to the OB ED with complaints of dizziness and weakness for the last week.  Patient attributes this to not fully recovering from COVID 3 weeks prior.  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  Vital signs are normal.  Urine dip is negative for ketones.  H and H is consistent with mild anemia and her fingerstick glucose right now is 60.  We will get an EKG due to her dizziness and weakness. [CD]      ED Course User Index  [CD] Cleo Esqueda MD                           Clinical Impression:  Final diagnoses:  [R53.1] Weakness (Primary)  [Z3A.35] 35 weeks gestation of pregnancy  [R42] Dizzy  [E16.2] Hypoglycemia          ED Disposition Condition    Discharge Stable          ED Prescriptions    None       Follow-up Information       Follow up With Specialties Details Why Contact Info    [facility] Cardiology Schedule an appointment as soon as possible for a visit in 1 week For workup weakess/dizzyness in pregnancy [address] [facility] [address]  [phone]             Sapna Young MD  Resident  24       Cleo Esqueda MD  [phone]

## 2024-07-09 ENCOUNTER — ROUTINE PRENATAL (OUTPATIENT)
Dept: OBSTETRICS AND GYNECOLOGY | Facility: CLINIC | Age: 24
End: 2024-07-09
Payer: COMMERCIAL

## 2024-07-09 ENCOUNTER — HOSPITAL ENCOUNTER (EMERGENCY)
Facility: OTHER | Age: 24
Discharge: HOME | End: 2024-07-09
Attending: OBSTETRICS & GYNECOLOGY
Payer: COMMERCIAL

## 2024-07-09 ENCOUNTER — PATIENT MESSAGE (OUTPATIENT)
Dept: ADMINISTRATIVE | Facility: HOSPITAL | Age: 24
End: 2024-07-09
Payer: COMMERCIAL

## 2024-07-09 VITALS
OXYGEN SATURATION: 97 % | TEMPERATURE: 98 F | SYSTOLIC BLOOD PRESSURE: 131 MMHG | RESPIRATION RATE: 13 BRPM | HEART RATE: 98 BPM | DIASTOLIC BLOOD PRESSURE: 76 MMHG

## 2024-07-09 VITALS
WEIGHT: 212.75 LBS | SYSTOLIC BLOOD PRESSURE: 128 MMHG | DIASTOLIC BLOOD PRESSURE: 92 MMHG | BODY MASS INDEX: 36.52 KG/M2

## 2024-07-09 DIAGNOSIS — R03.0 ELEVATED BLOOD PRESSURE READING: Primary | ICD-10-CM

## 2024-07-09 DIAGNOSIS — O36.63X0 EXCESSIVE FETAL GROWTH AFFECTING MANAGEMENT OF PREGNANCY IN THIRD TRIMESTER, SINGLE OR UNSPECIFIED FETUS: ICD-10-CM

## 2024-07-09 DIAGNOSIS — L29.9 PRURITUS: ICD-10-CM

## 2024-07-09 DIAGNOSIS — O16.3 ELEVATED BLOOD PRESSURE AFFECTING PREGNANCY IN THIRD TRIMESTER, ANTEPARTUM: ICD-10-CM

## 2024-07-09 DIAGNOSIS — Z3A.35 35 WEEKS GESTATION OF PREGNANCY: ICD-10-CM

## 2024-07-09 DIAGNOSIS — Z3A.35 35 WEEKS GESTATION OF PREGNANCY: Primary | ICD-10-CM

## 2024-07-09 DIAGNOSIS — L29.9 ITCHING: ICD-10-CM

## 2024-07-09 LAB
ALBUMIN SERPL BCP-MCNC: 3 G/DL (ref 3.5–5.2)
ALP SERPL-CCNC: 108 U/L (ref 55–135)
ALT SERPL W/O P-5'-P-CCNC: 10 U/L (ref 10–44)
ANION GAP SERPL CALC-SCNC: 12 MMOL/L (ref 8–16)
AST SERPL-CCNC: 16 U/L (ref 10–40)
BASOPHILS # BLD AUTO: ABNORMAL K/UL (ref 0–0.2)
BASOPHILS NFR BLD: 0 % (ref 0–1.9)
BILIRUB SERPL-MCNC: 0.6 MG/DL (ref 0.1–1)
BUN SERPL-MCNC: 8 MG/DL (ref 6–20)
CALCIUM SERPL-MCNC: 8.8 MG/DL (ref 8.7–10.5)
CHLORIDE SERPL-SCNC: 108 MMOL/L (ref 95–110)
CO2 SERPL-SCNC: 16 MMOL/L (ref 23–29)
CREAT SERPL-MCNC: 0.7 MG/DL (ref 0.5–1.4)
CREAT UR-MCNC: 59.4 MG/DL (ref 15–325)
DIFFERENTIAL METHOD BLD: ABNORMAL
EOSINOPHIL # BLD AUTO: ABNORMAL K/UL (ref 0–0.5)
EOSINOPHIL NFR BLD: 4 % (ref 0–8)
ERYTHROCYTE [DISTWIDTH] IN BLOOD BY AUTOMATED COUNT: 13.9 % (ref 11.5–14.5)
EST. GFR  (NO RACE VARIABLE): >60 ML/MIN/1.73 M^2
GLUCOSE SERPL-MCNC: 126 MG/DL (ref 70–110)
HCT VFR BLD AUTO: 31.5 % (ref 37–48.5)
HGB BLD-MCNC: 11 G/DL (ref 12–16)
IMM GRANULOCYTES # BLD AUTO: ABNORMAL K/UL (ref 0–0.04)
IMM GRANULOCYTES NFR BLD AUTO: ABNORMAL % (ref 0–0.5)
LYMPHOCYTES # BLD AUTO: ABNORMAL K/UL (ref 1–4.8)
LYMPHOCYTES NFR BLD: 15 % (ref 18–48)
MCH RBC QN AUTO: 30.6 PG (ref 27–31)
MCHC RBC AUTO-ENTMCNC: 34.9 G/DL (ref 32–36)
MCV RBC AUTO: 88 FL (ref 82–98)
MONOCYTES # BLD AUTO: ABNORMAL K/UL (ref 0.3–1)
MONOCYTES NFR BLD: 7 % (ref 4–15)
NEUTROPHILS NFR BLD: 72 % (ref 38–73)
NEUTS BAND NFR BLD MANUAL: 2 %
NRBC BLD-RTO: 0 /100 WBC
PLATELET # BLD AUTO: 204 K/UL (ref 150–450)
PLATELET BLD QL SMEAR: ABNORMAL
PMV BLD AUTO: 9.6 FL (ref 9.2–12.9)
POTASSIUM SERPL-SCNC: 3.8 MMOL/L (ref 3.5–5.1)
PROT SERPL-MCNC: 6.4 G/DL (ref 6–8.4)
PROT UR-MCNC: 15 MG/DL (ref 0–15)
PROT/CREAT UR: 0.25 MG/G{CREAT} (ref 0–0.2)
RBC # BLD AUTO: 3.59 M/UL (ref 4–5.4)
SODIUM SERPL-SCNC: 136 MMOL/L (ref 136–145)
WBC # BLD AUTO: 10.07 K/UL (ref 3.9–12.7)

## 2024-07-09 PROCEDURE — 59025 FETAL NON-STRESS TEST: CPT

## 2024-07-09 PROCEDURE — 99999 PR PBB SHADOW E&M-EST. PATIENT-LVL III: CPT | Mod: PBBFAC,,, | Performed by: OBSTETRICS & GYNECOLOGY

## 2024-07-09 PROCEDURE — 85027 COMPLETE CBC AUTOMATED: CPT

## 2024-07-09 PROCEDURE — 59025 FETAL NON-STRESS TEST: CPT | Mod: 26,,, | Performed by: OBSTETRICS & GYNECOLOGY

## 2024-07-09 PROCEDURE — 82570 ASSAY OF URINE CREATININE: CPT

## 2024-07-09 PROCEDURE — 99284 EMERGENCY DEPT VISIT MOD MDM: CPT | Mod: 25

## 2024-07-09 PROCEDURE — 80053 COMPREHEN METABOLIC PANEL: CPT

## 2024-07-09 PROCEDURE — 0502F SUBSEQUENT PRENATAL CARE: CPT | Mod: CPTII,S$GLB,, | Performed by: OBSTETRICS & GYNECOLOGY

## 2024-07-09 PROCEDURE — 85007 BL SMEAR W/DIFF WBC COUNT: CPT

## 2024-07-09 PROCEDURE — 84156 ASSAY OF PROTEIN URINE: CPT

## 2024-07-09 PROCEDURE — 99284 EMERGENCY DEPT VISIT MOD MDM: CPT | Mod: 25,,, | Performed by: OBSTETRICS & GYNECOLOGY

## 2024-07-09 NOTE — PROGRESS NOTES
"  Pregnancy dating, labs, ultrasound reports, prenatal testing, and problem list; prior records and results; and available outside records were reviewed and updated in EMR.  Pertinent findings were noted below.    Reason for Visit   Routine Prenatal Visit    HPI   24 y.o., at 35w5d by Estimated Date of Delivery: 24    She is not feeling " great" she was dizzy but no SOB or CP.  She was seen in the OB ED yesterday.       Contractions: No   Bleeding: No   Loss of fluid: No   Fetal movement: Yes   Nausea: No   Vomiting: No   Headache: No     Exam   BP (!) 128/92   Wt 96.5 kg (212 lb 11.9 oz)   LMP 10/28/2023 (Exact Date)   BMI 36.52 kg/m²     GENERAL: No acute distress  ABD: Gravid    Assessment and Plan   35 weeks gestation of pregnancy  -     Treponema Pallidium Antibodies IgG, IgM; Future; Expected date: 2024  -     HIV 1/2 Ag/Ab (4th Gen); Future; Expected date: 2024  -     US MFM Procedure ([facility])-Future; Future    Excessive fetal growth affecting management of pregnancy in third trimester, single or unspecified fetus  -     US MFM Procedure ([facility])-Future; Future    Itching  -     BILE ACIDS, CHOLYLGLYCINE; Future; Expected date: 2024    Pruritus    Diprolene was given, recommend benadryl and oatmeal soaks as well    She will need PRE E labs and BP series, 2 BP were in mild range with diastolic over 90.  Will stop working at this time due to her Sx and her elevated BP     labor and preE precautions given  Follow-up: 1 week if cleared by OB ED     "

## 2024-07-09 NOTE — ED PROVIDER NOTES
Encounter Date: 2024       History     Chief Complaint   Patient presents with    Hypertension     Almas Colon is a 24 y.o. F at 35w5d presents complaining of elevated BP.   This IUP is complicated by anemia, anxiety/depression, asthma.  Patient denies contractions, denies vaginal bleeding, denies LOF.   Fetal Movement: normal    Patient in ED for elevated BP of 128/92. Patient reports 6/10, RUQ pain when laying down but improves with change of position. She does report blurring of vision but denies any black spots. The blurry vision started at the beginning of per pregnancy, patient notes her prescription has changed. Patient denies headaches, SOB, chest pain, peripheral edema.               Review of patient's allergies indicates:   Allergen Reactions    Cefzil [cefprozil] Hives    Rocephin [ceftriaxone] Hives     Past Medical History:   Diagnosis Date    Acne     Anxiety     Asthma     Depression     Seasonal allergic rhinitis 2018     Past Surgical History:   Procedure Laterality Date    TONSILLECTOMY       Family History   Problem Relation Name Age of Onset    Glaucoma Paternal Grandmother      Diabetes Maternal Grandmother      Acne Father Stas     Acne Mother Livia     Cataracts Neg Hx      Macular degeneration Neg Hx      Retinal detachment Neg Hx      Uterine cancer Neg Hx      Breast cancer Neg Hx      Ovarian cancer Neg Hx      Cervical cancer Neg Hx       Social History     Tobacco Use    Smoking status: Never    Smokeless tobacco: Never   Substance Use Topics    Alcohol use: Not Currently     Comment: Occasionally. Maybe once a month or every few months    Drug use: No     Review of Systems   Constitutional:  Negative for chills and fatigue.   Eyes:  Positive for visual disturbance.   Respiratory:  Negative for chest tightness and shortness of breath.    Cardiovascular:  Negative for chest pain.   Gastrointestinal:  Positive for abdominal pain.   Genitourinary:  Negative for  vaginal bleeding and vaginal discharge.   Neurological:  Negative for headaches.       Physical Exam     Initial Vitals   BP Pulse Resp Temp SpO2   07/09/24 1848 07/09/24 1846 07/09/24 1848 07/09/24 1848 07/09/24 1846   132/84 99 13 98.3 °F (36.8 °C) 97 %      MAP       --                Physical Exam    Vitals reviewed.  Constitutional: She appears well-developed and well-nourished. She is not diaphoretic. No distress.   HENT:   Head: Normocephalic and atraumatic.   Eyes: Pupils are equal, round, and reactive to light.   Neck: Neck supple.   Normal range of motion.  Cardiovascular:  Normal rate.           Pulmonary/Chest: No respiratory distress.   Abdominal: Abdomen is soft. There is no abdominal tenderness. There is no rebound and no guarding.   Musculoskeletal:      Cervical back: Normal range of motion and neck supple.     Neurological: She is alert and oriented to person, place, and time.   Skin: Skin is warm and dry.   Psychiatric: She has a normal mood and affect.         ED Course   Obtain Fetal nonstress test (NST)    Date/Time: 7/9/2024 7:15 PM    Performed by: Eva Malhotra MD  Authorized by: Eva Malhotra MD    Nonstress Test:     Variability:  6-25 BPM    Decelerations:  None    Accelerations:  15 bpm    Baseline:  130    Contractions:  Not present  Biophysical Profile:     Nonstress Test Interpretation: reactive      Overall Impression:  Reassuring    Labs Reviewed   COMPREHENSIVE METABOLIC PANEL - Abnormal       Result Value    Sodium 136      Potassium 3.8      Chloride 108      CO2 16 (*)     Glucose 126 (*)     BUN 8      Creatinine 0.7      Calcium 8.8      Total Protein 6.4      Albumin 3.0 (*)     Total Bilirubin 0.6      Alkaline Phosphatase 108      AST 16      ALT 10      eGFR >60      Anion Gap 12     CBC W/ AUTO DIFFERENTIAL - Abnormal    WBC 10.07      RBC 3.59 (*)     Hemoglobin 11.0 (*)     Hematocrit 31.5 (*)     MCV 88      MCH 30.6      MCHC 34.9      RDW 13.9      Platelets 204       MPV 9.6      Immature Granulocytes CANCELED      Immature Grans (Abs) CANCELED      Lymph # CANCELED      Mono # CANCELED      Eos # CANCELED      Baso # CANCELED      nRBC 0      Gran % 72.0      Lymph % 15.0 (*)     Mono % 7.0      Eosinophil % 4.0      Basophil % 0.0      Bands 2.0      Platelet Estimate Appears normal      Differential Method Manual     PROTEIN / CREATININE RATIO, URINE - Abnormal    Protein, Urine Random 15      Creatinine, Urine 59.4      Prot/Creat Ratio, Urine 0.25 (*)     Narrative:     Specimen Source->Urine          Imaging Results    None          Medications - No data to display  Medical Decision Making  Almas Colon is a 24 y.o. F at 35w5d presents complaining of elevated BP.   BP normal range in [facility].   Pre E labs: PC ratio 0.25. 24-hr protein collected.   Pre-E precautions given. Return to ED precautions given.   Patient stable for discharge.              Amount and/or Complexity of Data Reviewed  Labs: ordered.              Attending Attestation:   Physician Attestation Statement for Resident:  As the supervising MD   Physician Attestation Statement: I have personally seen and examined this patient.   I agree with the above history.  -:   As the supervising MD I agree with the above PE.     As the supervising MD I agree with the above treatment, course, plan, and disposition.   -: NST reactive and reassuring, toco with no contractions.  Normal BP in the [facility].  Pre-E labs normal with equivocal P/C ratio.  Agree with discharge to home.  Will f/u with OB in 1 week.    I was personally present during the critical portions of the procedure(s) performed by the resident and was immediately available in the ED to provide services and assistance as needed during the entire procedure.   I have reviewed the following: old records at this facility.                ED Course as of 24 BP: 132/84 [CD]    BP: 135/80 [CD]    Patient is a  24-year-old  1 para 0 at 35 weeks and 5 days who presents to the OB ED after having an elevated blood pressure of 120s/90s in during her OB visit.  Patient was seen on yesterday due to complaints of dizziness and weakness.  Patient was noted to have hypoglycemia which corrected with p.o. intake.  Patient did have borderline blood pressures of upper 130s over upper 90s.  Patient denies headache or vision changes.  She does report some right upper quadrant pain but believes it is due to baby movement.  No elevated blood pressures noted in OB ED, pre E labs drawn. [CD]      ED Course User Index  [CD] Cleo Esqueda MD                           Clinical Impression:  Final diagnoses:  [R03.0] Elevated blood pressure reading (Primary)  [Z3A.35] 35 weeks gestation of pregnancy          ED Disposition Condition    Discharge Stable          ED Prescriptions    None       Follow-up Information    None          Eva Malhotra MD  Resident  07/10/24 7233       Cleo Esqueda MD  [phone]

## 2024-07-10 PROBLEM — R03.0 ELEVATED BLOOD PRESSURE READING: Status: ACTIVE | Noted: 2024-07-10

## 2024-07-10 NOTE — DISCHARGE INSTRUCTIONS
Please call us ([phone]) or come back to the [facility] if you have:   -Leakage of fluid like your water broke  -Vaginal bleeding (more than spotting)  -Baby is not moving normally  -Contractions every 2-3 minutes for 2 hours      24 hour urine. Please return urine to lab on 2nd floor.

## 2024-07-11 ENCOUNTER — PROCEDURE VISIT (OUTPATIENT)
Dept: MATERNAL FETAL MEDICINE | Facility: CLINIC | Age: 24
End: 2024-07-11
Payer: COMMERCIAL

## 2024-07-11 ENCOUNTER — LAB VISIT (OUTPATIENT)
Dept: LAB | Facility: OTHER | Age: 24
End: 2024-07-11
Attending: NURSE PRACTITIONER
Payer: COMMERCIAL

## 2024-07-11 DIAGNOSIS — O36.63X0 EXCESSIVE FETAL GROWTH AFFECTING MANAGEMENT OF PREGNANCY IN THIRD TRIMESTER, SINGLE OR UNSPECIFIED FETUS: ICD-10-CM

## 2024-07-11 DIAGNOSIS — Z3A.35 35 WEEKS GESTATION OF PREGNANCY: ICD-10-CM

## 2024-07-11 DIAGNOSIS — O16.3 ELEVATED BLOOD PRESSURE AFFECTING PREGNANCY IN THIRD TRIMESTER, ANTEPARTUM: ICD-10-CM

## 2024-07-11 LAB
PROT 24H UR-MRATE: 285 MG/SPEC (ref 0–100)
PROT UR-MCNC: 10 MG/DL (ref 0–15)
URINE COLLECTION DURATION: 24 HR
URINE VOLUME: 2850 ML

## 2024-07-11 PROCEDURE — 84156 ASSAY OF PROTEIN URINE: CPT

## 2024-07-11 PROCEDURE — 76805 OB US >/= 14 WKS SNGL FETUS: CPT | Mod: S$GLB,,, | Performed by: OBSTETRICS & GYNECOLOGY

## 2024-07-18 ENCOUNTER — ROUTINE PRENATAL (OUTPATIENT)
Dept: OBSTETRICS AND GYNECOLOGY | Facility: CLINIC | Age: 24
End: 2024-07-18
Payer: COMMERCIAL

## 2024-07-18 VITALS — BODY MASS INDEX: 36.4 KG/M2 | DIASTOLIC BLOOD PRESSURE: 86 MMHG | WEIGHT: 212.06 LBS | SYSTOLIC BLOOD PRESSURE: 136 MMHG

## 2024-07-18 DIAGNOSIS — Z3A.37 37 WEEKS GESTATION OF PREGNANCY: Primary | ICD-10-CM

## 2024-07-18 PROCEDURE — 82570 ASSAY OF URINE CREATININE: CPT | Performed by: OBSTETRICS & GYNECOLOGY

## 2024-07-18 PROCEDURE — 84156 ASSAY OF PROTEIN URINE: CPT | Performed by: OBSTETRICS & GYNECOLOGY

## 2024-07-18 PROCEDURE — 87081 CULTURE SCREEN ONLY: CPT | Performed by: OBSTETRICS & GYNECOLOGY

## 2024-07-18 PROCEDURE — 87147 CULTURE TYPE IMMUNOLOGIC: CPT | Performed by: OBSTETRICS & GYNECOLOGY

## 2024-07-19 ENCOUNTER — LAB VISIT (OUTPATIENT)
Dept: LAB | Facility: HOSPITAL | Age: 24
End: 2024-07-19
Attending: OBSTETRICS & GYNECOLOGY
Payer: COMMERCIAL

## 2024-07-19 DIAGNOSIS — Z3A.37 37 WEEKS GESTATION OF PREGNANCY: ICD-10-CM

## 2024-07-19 LAB
ALBUMIN SERPL BCP-MCNC: 3 G/DL (ref 3.5–5.2)
ALP SERPL-CCNC: 133 U/L (ref 55–135)
ALT SERPL W/O P-5'-P-CCNC: 8 U/L (ref 10–44)
ANION GAP SERPL CALC-SCNC: 12 MMOL/L (ref 8–16)
AST SERPL-CCNC: 17 U/L (ref 10–40)
BASOPHILS NFR BLD: 0 % (ref 0–1.9)
BILIRUB SERPL-MCNC: 0.5 MG/DL (ref 0.1–1)
BUN SERPL-MCNC: 6 MG/DL (ref 6–20)
CALCIUM SERPL-MCNC: 8.9 MG/DL (ref 8.7–10.5)
CHLORIDE SERPL-SCNC: 109 MMOL/L (ref 95–110)
CO2 SERPL-SCNC: 18 MMOL/L (ref 23–29)
CREAT SERPL-MCNC: 0.7 MG/DL (ref 0.5–1.4)
CREAT UR-MCNC: 70 MG/DL (ref 15–325)
DIFFERENTIAL METHOD BLD: ABNORMAL
EOSINOPHIL NFR BLD: 5 % (ref 0–8)
ERYTHROCYTE [DISTWIDTH] IN BLOOD BY AUTOMATED COUNT: 14.2 % (ref 11.5–14.5)
EST. GFR  (NO RACE VARIABLE): >60 ML/MIN/1.73 M^2
GLUCOSE SERPL-MCNC: 82 MG/DL (ref 70–110)
HCT VFR BLD AUTO: 36 % (ref 37–48.5)
HGB BLD-MCNC: 12 G/DL (ref 12–16)
HIV 1+2 AB+HIV1 P24 AG SERPL QL IA: NORMAL
IMM GRANULOCYTES # BLD AUTO: ABNORMAL K/UL (ref 0–0.04)
IMM GRANULOCYTES NFR BLD AUTO: ABNORMAL % (ref 0–0.5)
LDH SERPL L TO P-CCNC: 182 U/L (ref 110–260)
LYMPHOCYTES NFR BLD: 16 % (ref 18–48)
MCH RBC QN AUTO: 30.5 PG (ref 27–31)
MCHC RBC AUTO-ENTMCNC: 33.3 G/DL (ref 32–36)
MCV RBC AUTO: 91 FL (ref 82–98)
MONOCYTES NFR BLD: 6 % (ref 4–15)
MYELOCYTES NFR BLD MANUAL: 2 %
NEUTROPHILS NFR BLD: 69 % (ref 38–73)
NEUTS BAND NFR BLD MANUAL: 2 %
NRBC BLD-RTO: 0 /100 WBC
PLATELET # BLD AUTO: 195 K/UL (ref 150–450)
PMV BLD AUTO: 10.5 FL (ref 9.2–12.9)
POTASSIUM SERPL-SCNC: 3.9 MMOL/L (ref 3.5–5.1)
PROT SERPL-MCNC: 6.5 G/DL (ref 6–8.4)
PROT UR-MCNC: 10 MG/DL (ref 0–15)
PROT/CREAT UR: 0.14 MG/G{CREAT} (ref 0–0.2)
RBC # BLD AUTO: 3.94 M/UL (ref 4–5.4)
SODIUM SERPL-SCNC: 139 MMOL/L (ref 136–145)
TREPONEMA PALLIDUM IGG+IGM AB [PRESENCE] IN SERUM OR PLASMA BY IMMUNOASSAY: NONREACTIVE
WBC # BLD AUTO: 9.46 K/UL (ref 3.9–12.7)

## 2024-07-19 PROCEDURE — 85007 BL SMEAR W/DIFF WBC COUNT: CPT | Performed by: OBSTETRICS & GYNECOLOGY

## 2024-07-19 PROCEDURE — 86593 SYPHILIS TEST NON-TREP QUANT: CPT | Performed by: OBSTETRICS & GYNECOLOGY

## 2024-07-19 PROCEDURE — 83615 LACTATE (LD) (LDH) ENZYME: CPT | Performed by: OBSTETRICS & GYNECOLOGY

## 2024-07-19 PROCEDURE — 85027 COMPLETE CBC AUTOMATED: CPT | Performed by: OBSTETRICS & GYNECOLOGY

## 2024-07-19 PROCEDURE — 87389 HIV-1 AG W/HIV-1&-2 AB AG IA: CPT | Performed by: OBSTETRICS & GYNECOLOGY

## 2024-07-19 PROCEDURE — 80053 COMPREHEN METABOLIC PANEL: CPT | Performed by: OBSTETRICS & GYNECOLOGY

## 2024-07-19 PROCEDURE — 36415 COLL VENOUS BLD VENIPUNCTURE: CPT | Mod: PN | Performed by: OBSTETRICS & GYNECOLOGY

## 2024-07-21 LAB
OHS QRS DURATION: 84 MS
OHS QRS DURATION: 84 MS
OHS QTC CALCULATION: 418 MS
OHS QTC CALCULATION: 427 MS

## 2024-07-22 LAB — BACTERIA SPEC AEROBE CULT: ABNORMAL

## 2024-07-23 ENCOUNTER — ROUTINE PRENATAL (OUTPATIENT)
Dept: OBSTETRICS AND GYNECOLOGY | Facility: CLINIC | Age: 24
End: 2024-07-23
Payer: COMMERCIAL

## 2024-07-23 ENCOUNTER — PATIENT MESSAGE (OUTPATIENT)
Dept: OBSTETRICS AND GYNECOLOGY | Facility: CLINIC | Age: 24
End: 2024-07-23

## 2024-07-23 ENCOUNTER — OFFICE VISIT (OUTPATIENT)
Dept: PSYCHIATRY | Facility: CLINIC | Age: 24
End: 2024-07-23
Payer: COMMERCIAL

## 2024-07-23 VITALS
WEIGHT: 211.88 LBS | BODY MASS INDEX: 36.37 KG/M2 | DIASTOLIC BLOOD PRESSURE: 80 MMHG | SYSTOLIC BLOOD PRESSURE: 118 MMHG

## 2024-07-23 DIAGNOSIS — F90.2 ATTENTION DEFICIT HYPERACTIVITY DISORDER (ADHD), COMBINED TYPE: ICD-10-CM

## 2024-07-23 DIAGNOSIS — F41.1 GAD (GENERALIZED ANXIETY DISORDER): Primary | ICD-10-CM

## 2024-07-23 DIAGNOSIS — F43.23 ADJUSTMENT DISORDER WITH MIXED ANXIETY AND DEPRESSED MOOD: ICD-10-CM

## 2024-07-23 DIAGNOSIS — Z3A.37 37 WEEKS GESTATION OF PREGNANCY: Primary | ICD-10-CM

## 2024-07-23 DIAGNOSIS — N39.0 URINARY TRACT INFECTION WITHOUT HEMATURIA, SITE UNSPECIFIED: ICD-10-CM

## 2024-07-23 LAB
CREAT UR-MCNC: 127 MG/DL (ref 15–325)
PROT UR-MCNC: 23 MG/DL (ref 0–15)
PROT/CREAT UR: 0.18 MG/G{CREAT} (ref 0–0.2)

## 2024-07-23 PROCEDURE — 1159F MED LIST DOCD IN RCRD: CPT | Mod: CPTII,95,, | Performed by: PHYSICIAN ASSISTANT

## 2024-07-23 PROCEDURE — 99999 PR PBB SHADOW E&M-EST. PATIENT-LVL III: CPT | Mod: PBBFAC,,, | Performed by: OBSTETRICS & GYNECOLOGY

## 2024-07-23 PROCEDURE — G2211 COMPLEX E/M VISIT ADD ON: HCPCS | Mod: 95,,, | Performed by: PHYSICIAN ASSISTANT

## 2024-07-23 PROCEDURE — 1160F RVW MEDS BY RX/DR IN RCRD: CPT | Mod: CPTII,95,, | Performed by: PHYSICIAN ASSISTANT

## 2024-07-23 PROCEDURE — 99214 OFFICE O/P EST MOD 30 MIN: CPT | Mod: 95,,, | Performed by: PHYSICIAN ASSISTANT

## 2024-07-23 PROCEDURE — 82570 ASSAY OF URINE CREATININE: CPT | Performed by: OBSTETRICS & GYNECOLOGY

## 2024-07-23 PROCEDURE — 87086 URINE CULTURE/COLONY COUNT: CPT | Performed by: OBSTETRICS & GYNECOLOGY

## 2024-07-23 PROCEDURE — 0502F SUBSEQUENT PRENATAL CARE: CPT | Mod: CPTII,S$GLB,, | Performed by: OBSTETRICS & GYNECOLOGY

## 2024-07-23 RX ORDER — NITROFURANTOIN 25; 75 MG/1; MG/1
100 CAPSULE ORAL 2 TIMES DAILY
Qty: 10 CAPSULE | Refills: 0 | Status: ON HOLD | OUTPATIENT
Start: 2024-07-23 | End: 2024-07-28

## 2024-07-23 RX ORDER — FOLIC ACID 1 MG/1
1 TABLET ORAL
Status: ON HOLD | COMMUNITY

## 2024-07-23 NOTE — PROGRESS NOTES
The patient location is: at home in [place], [place]  The chief complaint leading to consultation is: dep/anxiety    Visit type: audiovisual    Face to Face time with patient: 15  30 minutes of total time spent on the encounter, which includes face to face time and non-face to face time preparing to see the patient (eg, review of tests), Obtaining and/or reviewing separately obtained history, Documenting clinical information in the electronic or other health record, Independently interpreting results (not separately reported) and communicating results to the patient/family/caregiver, or Care coordination (not separately reported).     Each patient to whom he or she provides medical services by telemedicine is:  (1) informed of the relationship between the physician and patient and the respective role of any other health care provider with respect to management of the patient; and (2) notified that he or she may decline to receive medical services by telemedicine and may withdraw from such care at any time.    Outpatient Psychiatry Follow-Up Visit (MD/NP)    7/23/2024    Clinical Status of Patient:  Outpatient (Ambulatory)    Chief Complaint:  Almas Chowdhury Isaiah is a 24 y.o. female who presents today for follow-up of depression, anxiety and attention problems.  Met with patient.     Interval History and Content of Current Session:  Interim Events/Subjective Report/Content of Current Session:  Almas is seen virtually today for medication follow-up.  Reports that she is doing mostly well, her baby will be arriving the first week of August.  Currently at home off of work.  States she has been mostly doing well though feels quite sleepy.  She has been enjoying being able to relax.  Anxiety has been fine felt that she was down for few days but that has since went away.  States that she feels well today.  She would like to continue with low-dose sertraline with potential plan to increase the medication in the  "future.  Will have short term follow-up after her baby is born.  Denies suicidal or homicidal ideation.  Feels optimistic and excited about the birth - her home is ready for arrival.  No other complaints today.    FROM PREVIOUS HPI  Almas is seen today for medication follow up.  Patient is seen virtually.  She is mostly doing well although recently come down with COVID.  Started having symptoms a couple of days ago and has mostly just been resting.  Started iron supplementation and feels that her energy level has improved.  Other than this, she has just been working and growing a baby".  Due date is between August 3rd and August 8th.  Sertraline continues to support mood and anxiety and she would like to continue with low-dose sertraline 25 mg once daily.  Denies suicidal or homicidal ideation.  Denies mood euphoria or hallucinations.  No other complaints today.        7/23/2024    11:53 AM 6/18/2024    10:59 AM 4/23/2024    10:07 AM   GAD7   1. Feeling nervous, anxious, or on edge? 0 0 0   2. Not being able to stop or control worrying? 0 0 0   3. Worrying too much about different things? 0 0 0   4. Trouble relaxing? 0 0 0   5. Being so restless that it is hard to sit still? 0 0 0   6. Becoming easily annoyed or irritable? 0 0 0   7. Feeling afraid as if something awful might happen? 0 0 0   8. If you checked off any problems, how difficult have these problems made it for you to do your work, take care of things at home, or get along with other people? 0     MATHEW-7 Score 0 0 0         7/23/2024    12:56 PM   PHQ-9 Depression Patient Health Questionnaire   Patient agreed to terms: Yes   Little interest or pleasure in doing things 0   Feeling down, depressed, or hopeless 0   Trouble falling or staying asleep, or sleeping too much 1   Feeling tired or having little energy 1   Poor appetite or overeating 0   Feeling bad about yourself - or that you are a failure or have let yourself or your family down 0   Trouble " concentrating on things, such as reading the newspaper or watching television 1   Moving or speaking so slowly that other people could have noticed. Or the opposite - being so fidgety or restless that you have been moving around a lot more than usual 0   Thoughts that you would be better off dead, or of hurting yourself in some way 0   PHQ-9 Total Score 3   If you checked off any problems, how difficult have these problems made it for you to do your work, take care of things at home, or get along with other people? Somewhat difficult   Interpretation Minimal or None         Outpatient Psychiatry Initial Visit (MD/NP)     1/28/2021     Almas Colon, a 20 y.o. female, presenting for initial evaluation visit. Met with patient.     Reason for Encounter: Referral from Dina Thayer NP. Patient complains of inattention/concentration.     History of Present Illness:   This is a healthy 20-year-old female, past medical history of well-controlled asthma, who presents for initial evaluation today.  She reports that she has been prescribed Vyvanse 20 mg daily for attention/concentration.  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  Has been on winter break so has not been taking the Vyvanse.  She does report that it has helped her mood, attention, focus, complete schoolwork as well as daily tasks.  She is considering antidepressant therapy but is not sure if that is necessary at this point.  She does start school next week and will see how she is doing on Vyvanse 40 mg daily and go from there.  She is unsure if she wants to stay on stimulant long-term.  Discussed Strattera for nonstimulant treatment of ADHD.  She will consider this medication as well.  Denies suicidal or homicidal ideation.  Appetite and sleep are stable.  Has supportive family and friends.     She states her mood has been stable.  She is entering her senior year of college.  Was going to graduate May 2021 but is pushing that back  to December 2021.  She is looking for engineering internships.  Program is challenging but she enjoys it.  Also established with Jennifer Jackson LCSW for psychotherapy.  Financial situation is a bit stressed.  Living with her mother.  Has been attempting to increase physical activity which does cause her to feel better.  Patient describes that she was on sertraline years ago and did well with that but it did cause her to have some questionable decision making.     Depression symptoms: patient reports little interest or pleasure in doing things; feeling down, depressed, or hopeless; trouble falling asleep or staying asleep, or sleeping too much; feeling tired or having little energy; poor appetite/overeating; trouble concentrating. Denies thoughts of self-harm or suicide.     Anxiety symptoms: reports feeling nervous, anxious, or on edge; not being able to stop or control worrying; worrying too much about different things; becoming easily annoyed or irritable.     Natalie/Hypomania symptoms: denies periods of time in which she has gone without sleep with elevated or irritable mood and impulsive decision making.  Mood disorder questionnaire, she does admit to irritability, racing thoughts, distractibility at times more social or outgoing.     Psychosis: denies     Attention/Concentration:  Improved with Vyvanse     Body Image/Hx of eating disorders: denies     Suicidal ideation and risk: denies, no history of suicide attempts     Homicidal/Violient ideation and risk: denies     Sleep: not sleeping that well, trying to get back into sleep schedule, used to take melatonin      Appetite:  Adequate     GAD7:  Five, somewhat difficult  PHQ9:  13, extremely difficult  MDQ:  Negative screen     Past Psychiatric History:  Prior diagnoses: ADHD, depressive symptoms in the past     Inpatient psychiatric treatment:  No history of inpatient or intensive outpatient     Outpatient psychiatric treatment:  Primary care provider      Prior medications: zoloft, wellbutrin, vyvanse     Current medications: vyvanse     Prior suicide attempts:  None     Prior history self harm:  No history of self-injurious behavior     Prior psychotherapy:  Currently involved with Jennifer Jackson LCSW        Allergies:       Review of patient's allergies indicates:   Allergen Reactions    Ceftriaxone Hives    Cefzil [cefprozil] Hives         Past Medical History:       Past Medical History:   Diagnosis Date    Acne      Asthma      Depression      Kidney stone      Seasonal allergic rhinitis 5/30/2018   G0     History TBI: denies  History seizures: denies     Past Surgical History:        Past Surgical History:   Procedure Laterality Date    TONSILLECTOMY             Family History:   Suicide: denies  Substance use: denies  Bipolar disorder/Psychotic disorder: denies  Anxiety: mother  Depression: mother     Social History:  Childhood: born in [place], raised by biological mother and father. Living with mom now. Father is staying with her grandmother. Establishing a relationship with her father. Two half sisters and step-sister. Doing well. Good relationship.  Marital status: supportive boyfriend  Children: none  Resides: [place]  Occupation: not working right now, going to try find part time job  Hobbies: trying to find a hobby  Episcopalian: none  Education level: senior in college  : denies  Legal: denies  History of abuse/trauma: a bit of emotional trauma, working through in therapy     Substance History:  Tobacco: denies  Alcohol: occasionally, rarely  Drug use: denies  Caffeine: cup a day of coffee      Rehab:  Prior/current AA: denies    Review of Systems   PSYCHIATRIC: Pertinant items are noted in the narrative.  RESPIRATORY: No shortness of breath.  CARDIOVASCULAR: No tachycardia or chest pain.  GASTROINTESTINAL: No nausea, vomiting, pain, constipation or diarrhea.    Past Medical, Family and Social History: The patient's past medical, family  and social history have been reviewed and updated as appropriate within the electronic medical record - see encounter notes.    Compliance: yes    Side effects: none    Risk Parameters:  Patient reports no suicidal ideation  Patient reports no homicidal ideation  Patient reports no self-injurious behavior  Patient reports no violent behavior    Exam (detailed: at least 9 elements; comprehensive: all 15 elements)   Constitutional  Vitals:  Most recent vital signs, dated less than 90 days prior to this appointment, were reviewed.   There were no vitals filed for this visit.       General:  unremarkable, age appropriate, casually dressed     Musculoskeletal  Muscle Strength/Tone:  no dyskinesia   Gait & Station:  Virtual visit     Psychiatric  Speech:  no latency; no press   Mood & Affect:  good  congruent and appropriate   Thought Process:  normal and logical   Associations:  intact   Thought Content:  normal, no suicidality, no homicidality, delusions, or paranoia   Insight:  intact   Judgement: behavior is adequate to circumstances   Orientation:  grossly intact   Memory: intact for content of interview   Language: grossly intact   Attention Span & Concentration:  able to focus   Fund of Knowledge:  intact and appropriate to age and level of education     Assessment and Diagnosis   Status/Progress: Based on the examination today, the patient's problem(s) is/are improved.  New problems have not been presented today.   Co-morbidities, Diagnostic uncertainty and Lack of compliance are not complicating management of the primary condition.     General Impression:   Adjustment disorder  ADHD, combined presentation type  Recent confirmed pregnancy    Intervention/Counseling/Treatment Plan   Medication Management: Continue current medications. The risks and benefits of medication were discussed with the patient.  Counseling provided with patient as follows: importance of compliance with chosen treatment options was  emphasized, risks and benefits of treatment options, including medications, were discussed with the patient, risk factor reduction, prognosis    Almas is seen today for medication follow up.  Based on assessment:    Continue sertraline 25 mg once daily for mood/anxiety. Will have very close f/u with this patient.     Please go to emergency department if feeling as though you are a harm to yourself or others or if you are in crisis.     Please call the clinic to report any worsening of symptoms or problems associated with medication.    This medication has US boxed warning that states there may be an increased risk of suicidal thoughts and behavior in pediatric and young adult patients in short-term studies. Closely monitor all antidepressant-treated patients for clinical worsening, and for emergence of suicidal thoughts and behaviors.    Return to Clinic: as scheduled, as needed

## 2024-07-23 NOTE — PROGRESS NOTES
Pregnancy dating, labs, ultrasound reports, prenatal testing, and problem list; prior records and results; and available outside records were reviewed and updated in EMR.  Pertinent findings were noted below.    Reason for Visit   Routine Prenatal Visit    HPI   24 y.o., at 37w5d by Estimated Date of Delivery: 24    Us with us today showed EDC 2024 at 7 1/7 wks    Contractions: No regular ctx   Bleeding: No   Loss of fluid: No   Fetal movement: Yes   Nausea: No   Vomiting: No   Headache: No     Exam   /80   Wt 96.1 kg (211 lb 13.8 oz)   LMP 10/28/2023 (Exact Date)   BMI 36.37 kg/m²     GENERAL: No acute distress  ABD: Gravid    Assessment and Plan   37 weeks gestation of pregnancy  -     IP OB Labor Induction; Future; Expected date: 2024      Rhogam. Tdap done     labor and Labor precautions given  Follow-up: 1 week

## 2024-07-24 LAB
BACTERIA UR CULT: NORMAL
BACTERIA UR CULT: NORMAL

## 2024-07-27 ENCOUNTER — ANESTHESIA EVENT (OUTPATIENT)
Dept: OBSTETRICS AND GYNECOLOGY | Facility: OTHER | Age: 24
End: 2024-07-27
Payer: COMMERCIAL

## 2024-07-27 ENCOUNTER — HOSPITAL ENCOUNTER (INPATIENT)
Facility: OTHER | Age: 24
LOS: 3 days | Discharge: HOME | End: 2024-07-30
Attending: OBSTETRICS & GYNECOLOGY | Admitting: OBSTETRICS & GYNECOLOGY
Payer: COMMERCIAL

## 2024-07-27 ENCOUNTER — ANESTHESIA (OUTPATIENT)
Dept: OBSTETRICS AND GYNECOLOGY | Facility: OTHER | Age: 24
End: 2024-07-27
Payer: COMMERCIAL

## 2024-07-27 DIAGNOSIS — O42.90 PREMATURE RUPTURE OF MEMBRANES, UNSPECIFIED DURATION TO ONSET OF LABOR, UNSPECIFIED GESTATIONAL AGE: ICD-10-CM

## 2024-07-27 DIAGNOSIS — O42.90 PROM (PREMATURE RUPTURE OF MEMBRANES): ICD-10-CM

## 2024-07-27 PROBLEM — O13.3 GESTATIONAL HYPERTENSION, THIRD TRIMESTER: Status: ACTIVE | Noted: 2024-07-27

## 2024-07-27 PROBLEM — R03.0 ELEVATED BLOOD PRESSURE READING: Status: RESOLVED | Noted: 2024-07-10 | Resolved: 2024-07-27

## 2024-07-27 LAB
ABO + RH BLD: ABNORMAL
ALBUMIN SERPL BCP-MCNC: 2.8 G/DL (ref 3.5–5.2)
ALP SERPL-CCNC: 138 U/L (ref 55–135)
ALT SERPL W/O P-5'-P-CCNC: 9 U/L (ref 10–44)
ANION GAP SERPL CALC-SCNC: 9 MMOL/L (ref 8–16)
AST SERPL-CCNC: 14 U/L (ref 10–40)
BASOPHILS # BLD AUTO: 0.07 K/UL (ref 0–0.2)
BASOPHILS NFR BLD: 0.5 % (ref 0–1.9)
BILIRUB SERPL-MCNC: 0.5 MG/DL (ref 0.1–1)
BLD GP AB SCN CELLS X3 SERPL QL: ABNORMAL
BLOOD GROUP ANTIBODIES SERPL: NORMAL
BUN SERPL-MCNC: 8 MG/DL (ref 6–20)
CALCIUM SERPL-MCNC: 9 MG/DL (ref 8.7–10.5)
CHLORIDE SERPL-SCNC: 111 MMOL/L (ref 95–110)
CO2 SERPL-SCNC: 17 MMOL/L (ref 23–29)
CREAT SERPL-MCNC: 0.7 MG/DL (ref 0.5–1.4)
DIFFERENTIAL METHOD BLD: ABNORMAL
EOSINOPHIL # BLD AUTO: 0.2 K/UL (ref 0–0.5)
EOSINOPHIL NFR BLD: 1.3 % (ref 0–8)
ERYTHROCYTE [DISTWIDTH] IN BLOOD BY AUTOMATED COUNT: 13.5 % (ref 11.5–14.5)
EST. GFR  (NO RACE VARIABLE): >60 ML/MIN/1.73 M^2
GLUCOSE SERPL-MCNC: 87 MG/DL (ref 70–110)
HCT VFR BLD AUTO: 33.1 % (ref 37–48.5)
HGB BLD-MCNC: 12.2 G/DL (ref 12–16)
IMM GRANULOCYTES # BLD AUTO: 0.4 K/UL (ref 0–0.04)
IMM GRANULOCYTES NFR BLD AUTO: 3 % (ref 0–0.5)
LYMPHOCYTES # BLD AUTO: 1.4 K/UL (ref 1–4.8)
LYMPHOCYTES NFR BLD: 10.2 % (ref 18–48)
MCH RBC QN AUTO: 31.6 PG (ref 27–31)
MCHC RBC AUTO-ENTMCNC: 36.9 G/DL (ref 32–36)
MCV RBC AUTO: 86 FL (ref 82–98)
MONOCYTES # BLD AUTO: 0.9 K/UL (ref 0.3–1)
MONOCYTES NFR BLD: 6.8 % (ref 4–15)
NEUTROPHILS # BLD AUTO: 10.4 K/UL (ref 1.8–7.7)
NEUTROPHILS NFR BLD: 78.2 % (ref 38–73)
NRBC BLD-RTO: 0 /100 WBC
PLATELET # BLD AUTO: 223 K/UL (ref 150–450)
PMV BLD AUTO: 10.1 FL (ref 9.2–12.9)
POTASSIUM SERPL-SCNC: 3.6 MMOL/L (ref 3.5–5.1)
PROT SERPL-MCNC: 6.3 G/DL (ref 6–8.4)
RBC # BLD AUTO: 3.86 M/UL (ref 4–5.4)
SODIUM SERPL-SCNC: 137 MMOL/L (ref 136–145)
SPECIMEN OUTDATE: ABNORMAL
TREPONEMA PALLIDUM IGG+IGM AB [PRESENCE] IN SERUM OR PLASMA BY IMMUNOASSAY: NONREACTIVE
WBC # BLD AUTO: 13.31 K/UL (ref 3.9–12.7)

## 2024-07-27 PROCEDURE — C1751 CATH, INF, PER/CENT/MIDLINE: HCPCS | Performed by: STUDENT IN AN ORGANIZED HEALTH CARE EDUCATION/TRAINING PROGRAM

## 2024-07-27 PROCEDURE — 4A0HXCZ MEASUREMENT OF PRODUCTS OF CONCEPTION, CARDIAC RATE, EXTERNAL APPROACH: ICD-10-PCS | Performed by: INTERNAL MEDICINE

## 2024-07-27 PROCEDURE — 72100002 HC LABOR CARE, 1ST 8 HOURS

## 2024-07-27 PROCEDURE — 80053 COMPREHEN METABOLIC PANEL: CPT

## 2024-07-27 PROCEDURE — 86900 BLOOD TYPING SEROLOGIC ABO: CPT

## 2024-07-27 PROCEDURE — 63600175 PHARM REV CODE 636 W HCPCS

## 2024-07-27 PROCEDURE — 25000003 PHARM REV CODE 250

## 2024-07-27 PROCEDURE — 99285 EMERGENCY DEPT VISIT HI MDM: CPT | Mod: 25

## 2024-07-27 PROCEDURE — 59409 OBSTETRICAL CARE: CPT | Mod: AA,,, | Performed by: STUDENT IN AN ORGANIZED HEALTH CARE EDUCATION/TRAINING PROGRAM

## 2024-07-27 PROCEDURE — 62326 NJX INTERLAMINAR LMBR/SAC: CPT

## 2024-07-27 PROCEDURE — 86850 RBC ANTIBODY SCREEN: CPT

## 2024-07-27 PROCEDURE — 27200710 HC EPIDURAL INFUSION PUMP SET: Performed by: STUDENT IN AN ORGANIZED HEALTH CARE EDUCATION/TRAINING PROGRAM

## 2024-07-27 PROCEDURE — 86870 RBC ANTIBODY IDENTIFICATION: CPT

## 2024-07-27 PROCEDURE — 85025 COMPLETE CBC W/AUTO DIFF WBC: CPT

## 2024-07-27 PROCEDURE — 59025 FETAL NON-STRESS TEST: CPT

## 2024-07-27 PROCEDURE — 86593 SYPHILIS TEST NON-TREP QUANT: CPT

## 2024-07-27 PROCEDURE — 11000001 HC ACUTE MED/SURG PRIVATE ROOM

## 2024-07-27 PROCEDURE — 86901 BLOOD TYPING SEROLOGIC RH(D): CPT

## 2024-07-27 RX ORDER — CALCIUM CARBONATE 200(500)MG
500 TABLET,CHEWABLE ORAL 3 TIMES DAILY PRN
Status: DISCONTINUED | OUTPATIENT
Start: 2024-07-27 | End: 2024-07-28

## 2024-07-27 RX ORDER — SODIUM CHLORIDE 9 MG/ML
INJECTION, SOLUTION INTRAVENOUS
Status: DISCONTINUED | OUTPATIENT
Start: 2024-07-27 | End: 2024-07-28

## 2024-07-27 RX ORDER — DIPHENOXYLATE HYDROCHLORIDE AND ATROPINE SULFATE 2.5; .025 MG/1; MG/1
2 TABLET ORAL EVERY 6 HOURS PRN
Status: DISCONTINUED | OUTPATIENT
Start: 2024-07-27 | End: 2024-07-28

## 2024-07-27 RX ORDER — OXYTOCIN-SODIUM CHLORIDE 0.9% IV SOLN 30 UNIT/500ML 30-0.9/5 UT/ML-%
95 SOLUTION INTRAVENOUS ONCE
Status: DISCONTINUED | OUTPATIENT
Start: 2024-07-27 | End: 2024-07-28

## 2024-07-27 RX ORDER — MISOPROSTOL 200 UG/1
800 TABLET ORAL ONCE AS NEEDED
Status: COMPLETED | OUTPATIENT
Start: 2024-07-27 | End: 2024-07-28

## 2024-07-27 RX ORDER — NITROFURANTOIN 25; 75 MG/1; MG/1
100 CAPSULE ORAL EVERY 12 HOURS
Status: DISCONTINUED | OUTPATIENT
Start: 2024-07-27 | End: 2024-07-28

## 2024-07-27 RX ORDER — TRANEXAMIC ACID 10 MG/ML
1000 INJECTION, SOLUTION INTRAVENOUS EVERY 30 MIN PRN
Status: DISCONTINUED | OUTPATIENT
Start: 2024-07-27 | End: 2024-07-28

## 2024-07-27 RX ORDER — SIMETHICONE 80 MG
1 TABLET,CHEWABLE ORAL 4 TIMES DAILY PRN
Status: DISCONTINUED | OUTPATIENT
Start: 2024-07-27 | End: 2024-07-28

## 2024-07-27 RX ORDER — OXYTOCIN-SODIUM CHLORIDE 0.9% IV SOLN 30 UNIT/500ML 30-0.9/5 UT/ML-%
95 SOLUTION INTRAVENOUS ONCE AS NEEDED
Status: DISCONTINUED | OUTPATIENT
Start: 2024-07-27 | End: 2024-07-28

## 2024-07-27 RX ORDER — ONDANSETRON 8 MG/1
8 TABLET, ORALLY DISINTEGRATING ORAL EVERY 8 HOURS PRN
Status: DISCONTINUED | OUTPATIENT
Start: 2024-07-27 | End: 2024-07-28

## 2024-07-27 RX ORDER — METHYLERGONOVINE MALEATE 0.2 MG/ML
200 INJECTION INTRAVENOUS ONCE AS NEEDED
Status: DISCONTINUED | OUTPATIENT
Start: 2024-07-27 | End: 2024-07-28

## 2024-07-27 RX ORDER — LIDOCAINE HYDROCHLORIDE 10 MG/ML
10 INJECTION, SOLUTION INFILTRATION; PERINEURAL ONCE AS NEEDED
Status: DISCONTINUED | OUTPATIENT
Start: 2024-07-27 | End: 2024-07-28

## 2024-07-27 RX ORDER — ALBUTEROL SULFATE 90 UG/1
2 INHALANT RESPIRATORY (INHALATION) EVERY 8 HOURS
Status: DISCONTINUED | OUTPATIENT
Start: 2024-07-28 | End: 2024-07-28

## 2024-07-27 RX ORDER — SODIUM CHLORIDE, SODIUM LACTATE, POTASSIUM CHLORIDE, CALCIUM CHLORIDE 600; 310; 30; 20 MG/100ML; MG/100ML; MG/100ML; MG/100ML
INJECTION, SOLUTION INTRAVENOUS CONTINUOUS
Status: DISCONTINUED | OUTPATIENT
Start: 2024-07-27 | End: 2024-07-28

## 2024-07-27 RX ORDER — OXYTOCIN-SODIUM CHLORIDE 0.9% IV SOLN 30 UNIT/500ML 30-0.9/5 UT/ML-%
10 SOLUTION INTRAVENOUS ONCE AS NEEDED
Status: DISCONTINUED | OUTPATIENT
Start: 2024-07-27 | End: 2024-07-28

## 2024-07-27 RX ORDER — ACETAMINOPHEN 325 MG/1
650 TABLET ORAL EVERY 6 HOURS PRN
Status: DISCONTINUED | OUTPATIENT
Start: 2024-07-27 | End: 2024-07-28

## 2024-07-27 RX ORDER — FLUTICASONE FUROATE AND VILANTEROL 100; 25 UG/1; UG/1
1 POWDER RESPIRATORY (INHALATION) DAILY
Status: DISCONTINUED | OUTPATIENT
Start: 2024-07-28 | End: 2024-07-30 | Stop reason: HOSPADM

## 2024-07-27 RX ORDER — OXYTOCIN-SODIUM CHLORIDE 0.9% IV SOLN 30 UNIT/500ML 30-0.9/5 UT/ML-%
0-32 SOLUTION INTRAVENOUS CONTINUOUS
Status: DISCONTINUED | OUTPATIENT
Start: 2024-07-27 | End: 2024-07-28

## 2024-07-27 RX ORDER — OXYTOCIN-SODIUM CHLORIDE 0.9% IV SOLN 30 UNIT/500ML 30-0.9/5 UT/ML-%
10 SOLUTION INTRAVENOUS ONCE
Status: DISCONTINUED | OUTPATIENT
Start: 2024-07-27 | End: 2024-07-28

## 2024-07-27 RX ORDER — FENTANYL/BUPIVACAINE/NS/PF 2MCG/ML-.1
PLASTIC BAG, INJECTION (ML) INJECTION
Status: DISCONTINUED | OUTPATIENT
Start: 2024-07-27 | End: 2024-07-28

## 2024-07-27 RX ORDER — MISOPROSTOL 200 UG/1
800 TABLET ORAL ONCE AS NEEDED
Status: DISCONTINUED | OUTPATIENT
Start: 2024-07-27 | End: 2024-07-28

## 2024-07-27 RX ORDER — CARBOPROST TROMETHAMINE 250 UG/ML
250 INJECTION, SOLUTION INTRAMUSCULAR
Status: DISCONTINUED | OUTPATIENT
Start: 2024-07-27 | End: 2024-07-28

## 2024-07-27 RX ORDER — LANOLIN ALCOHOL/MO/W.PET/CERES
1 CREAM (GRAM) TOPICAL DAILY
Status: DISCONTINUED | OUTPATIENT
Start: 2024-07-28 | End: 2024-07-30 | Stop reason: HOSPADM

## 2024-07-27 RX ORDER — LIDOCAINE HYDROCHLORIDE AND EPINEPHRINE 15; 5 MG/ML; UG/ML
INJECTION, SOLUTION EPIDURAL
Status: DISCONTINUED | OUTPATIENT
Start: 2024-07-27 | End: 2024-07-28

## 2024-07-27 RX ORDER — DOCUSATE SODIUM 100 MG/1
100 CAPSULE, LIQUID FILLED ORAL DAILY
Status: DISCONTINUED | OUTPATIENT
Start: 2024-07-28 | End: 2024-07-30 | Stop reason: HOSPADM

## 2024-07-27 RX ORDER — OXYTOCIN 10 [USP'U]/ML
10 INJECTION, SOLUTION INTRAMUSCULAR; INTRAVENOUS ONCE AS NEEDED
Status: DISCONTINUED | OUTPATIENT
Start: 2024-07-27 | End: 2024-07-28

## 2024-07-27 RX ORDER — FENTANYL/BUPIVACAINE/NS/PF 2MCG/ML-.1
PLASTIC BAG, INJECTION (ML) INJECTION
Status: COMPLETED
Start: 2024-07-27 | End: 2024-07-27

## 2024-07-27 RX ORDER — SERTRALINE HYDROCHLORIDE 25 MG/1
25 TABLET, FILM COATED ORAL DAILY
Status: DISCONTINUED | OUTPATIENT
Start: 2024-07-28 | End: 2024-07-30 | Stop reason: HOSPADM

## 2024-07-27 RX ADMIN — Medication 10 ML/HR: at 11:07

## 2024-07-27 RX ADMIN — DEXTROSE MONOHYDRATE 3 MILLION UNITS: 50 INJECTION, SOLUTION INTRAVENOUS at 08:07

## 2024-07-27 RX ADMIN — NITROFURANTOIN MONOHYDRATE/MACROCRYSTALS 100 MG: 25; 75 CAPSULE ORAL at 08:07

## 2024-07-27 RX ADMIN — DEXTROSE MONOHYDRATE 5 MILLION UNITS: 5 INJECTION INTRAVENOUS at 05:07

## 2024-07-27 RX ADMIN — LIDOCAINE HYDROCHLORIDE,EPINEPHRINE BITARTRATE 5 ML: 15; .005 INJECTION, SOLUTION EPIDURAL; INFILTRATION; INTRACAUDAL; PERINEURAL at 11:07

## 2024-07-27 RX ADMIN — OXYTOCIN 4 MILLI-UNITS/MIN: 10 INJECTION, SOLUTION INTRAMUSCULAR; INTRAVENOUS at 07:07

## 2024-07-27 RX ADMIN — Medication 5 ML: at 11:07

## 2024-07-27 RX ADMIN — SODIUM CHLORIDE, POTASSIUM CHLORIDE, SODIUM LACTATE AND CALCIUM CHLORIDE 125 ML/HR: 600; 310; 30; 20 INJECTION, SOLUTION INTRAVENOUS at 04:07

## 2024-07-27 NOTE — ANESTHESIA PREPROCEDURE EVALUATION
[facility]  Anesthesia Pre-Operative Evaluation     Patient Name: Almas Colon  YOB: 2000  MRN: [id number]  [facility]: [id number]      Code Status: Full Code   Date of Procedure:   Anesthesia: * No surgery found * Procedure: * No surgery found *  Pre-Operative Diagnosis: * No surgery found *  Proceduralist: * Surgery not found * * Surgery not found *        SUBJECTIVE:     Pre-operative evaluation for * No surgery found *     2024    Almas Colon is a 24 y.o. female  at 38w2d who presents to the OB ED today (2024) with CC of ROM. Pt state she was using restroom at approx 1400, and experienced continued leaking. She reports continued leaking of clear fluid since this time. Of note, pt is currently being treated for UTI outt. She is currently day 6/7 of Macrobid.      Patient now presents for the above procedure(s).       Anticoagulants   Medication Route Frequency        ________________________________________  No results found for this or any previous visit.    ________________________________________    Prev airway: None documented.        LDA: None documented.       Drips:    lactated ringers   Intravenous Continuous           Patient Active Problem List   Diagnosis    Seasonal allergic rhinitis    PROM (premature rupture of membranes)    Gestational hypertension, third trimester       Review of patient's allergies indicates:   Allergen Reactions    Cefzil [cefprozil] Hives    Rocephin [ceftriaxone] Hives       Current Inpatient Medications:    [START ON 2024] docusate sodium  100 mg Oral Daily    [START ON 2024] ferrous sulfate  1 tablet Oral Daily    [START ON 2024] fluticasone furoate-vilanteroL  1 puff Inhalation Daily    nitrofurantoin (macrocrystal-monohydrate)  100 mg Oral Q12H    oxytocin  10.0002 Units Intravenous Once    oxytocin  95 torsten-units/min Intravenous Once    pencillin G potassium IV  (PEDS and ADULTS)  3 Million Units Intravenous Q4H    pencillin G potassium IV (PEDS and ADULTS)  5 Million Units Intravenous Once    [START ON 7/28/2024] sertraline  25 mg Oral Daily       No current facility-administered medications on file prior to encounter.     Current Outpatient Medications on File Prior to Encounter   Medication Sig Dispense Refill    aspirin (ECOTRIN) 81 MG EC tablet Take 1 tablet (81 mg total) by mouth once daily.      augmented betamethasone (DIPROLENE) 0.05 % lotion Apply topically 2 (two) times daily. 60 mL 1    b complex vitamins capsule Take 1 capsule by mouth once daily.      budesonide-formoterol 160-4.5 mcg (SYMBICORT) 160-4.5 mcg/actuation HFAA Inhale 2 puffs into the lungs every 12 (twelve) hours. Controller 10.2 g 2    ferrous sulfate 325 (65 FE) MG EC tablet Take 1 tablet (325 mg total) by mouth 2 (two) times daily. 60 tablet 1    folic acid (FOLVITE) 1 MG tablet Take 1 mg by mouth.      nitrofurantoin, macrocrystal-monohydrate, (MACROBID) 100 MG capsule Take 1 capsule (100 mg total) by mouth 2 (two) times daily. for 5 days 10 capsule 0    prenatal vit no.124/iron/folic (PRENATAL VITAMIN ORAL) Take by mouth.      sertraline (ZOLOFT) 25 MG tablet Take 1 tablet (25 mg total) by mouth once daily. 30 tablet 2       Past Surgical History:   Procedure Laterality Date    TONSILLECTOMY         Social History:  Tobacco Use: Low Risk  (7/24/2024)    Patient History     Smoking Tobacco Use: Never     Smokeless Tobacco Use: Never     Passive Exposure: Not on file       Alcohol Use: Not At Risk (7/19/2024)    Received from [facility]    AUDIT-C     Frequency of Alcohol Consumption: Never     Average Number of Drinks: Not on file     Frequency of Binge Drinking: Not on file       OBJECTIVE:     Vital Signs Range:  BMI Readings from Last 1 Encounters:   07/23/24 36.37 kg/m²       Temp:  [37.2 °C (99 °F)]   Pulse:  [118]   Resp:  [19]   BP: (137-138)/(89-93)   SpO2:   [97 %]        Significant Labs:        Component Value Date/Time    WBC 9.46 07/19/2024 1347    HGB 12.0 07/19/2024 1347    HCT 36.0 (L) 07/19/2024 1347     07/19/2024 1347     07/19/2024 1347    K 3.9 07/19/2024 1347     07/19/2024 1347    CO2 18 (L) 07/19/2024 1347    GLU 82 07/19/2024 1347    BUN 6 07/19/2024 1347    CREATININE 0.7 07/19/2024 1347    CALCIUM 8.9 07/19/2024 1347    ALBUMIN 3.0 (L) 07/19/2024 1347    PROT 6.5 07/19/2024 1347    ALKPHOS 133 07/19/2024 1347    BILITOT 0.5 07/19/2024 1347    AST 17 07/19/2024 1347    ALT 8 (L) 07/19/2024 1347    HGBA1C 4.3 08/24/2021 0923        Please see Results Review for additional labs.     Diagnostic Studies: No relevant studies.    EKG:   Results for orders placed or performed in visit on 07/08/24   EKG 12-lead    Collection Time: 07/08/24 12:57 PM   Result Value Ref Range    QRS Duration 84 ms    [facility] QTC Calculation 427 ms    Narrative    Test Reason :     Vent. Rate : 092 BPM     Atrial Rate : 092 BPM     P-R Int : 124 ms          QRS Dur : 084 ms      QT Int : 346 ms       P-R-T Axes : 032 065 -01 degrees     QTc Int : 427 ms    Normal sinus rhythm  Right ventricular conduction delay  Abnormal ECG  When compared with ECG of 08-JUL-2024 12:57,  No significant change was found  Confirmed by Haider SOSA, Cristobal NÚÑEZ ([id number]) on 7/21/2024 1:11:50 PM    Referred By: [email or web address]   SELF           Confirmed By:Cristobal Maloney MD       ECHO:  See subjective, if available.      ASSESSMENT/PLAN:           Pre-op Assessment    I have reviewed the Patient Summary Reports.    I have reviewed the NPO Status.   I have reviewed the Medications.     Review of Systems  Anesthesia Hx:  No problems with previous Anesthesia   Neg history of prior surgery.          Denies Family Hx of Anesthesia complications.         Physical Exam  General: Well nourished, Cooperative, Alert and Oriented    Airway:  Mallampati: I   Mouth Opening: Normal  TM Distance: Normal  Tongue:  Normal  Neck ROM: Normal ROM    Dental:  Intact    Chest/Lungs:  Clear to auscultation, Normal Respiratory Rate    Heart:  Rate: Normal  Rhythm: Regular Rhythm  Sounds: Normal        Anesthesia Plan  Type of Anesthesia, risks & benefits discussed:    Anesthesia Type: Epidural, MAC, Gen Natural Airway  Intra-op Monitoring Plan: Standard ASA Monitors  Post Op Pain Control Plan: multimodal analgesia and IV/PO Opioids PRN  Induction:  IV  Airway Plan: , Awake  Informed Consent: Informed consent signed with the Patient and all parties understand the risks and agree with anesthesia plan.  All questions answered. Patient consented to blood products? No  ASA Score: 2  Day of Surgery Review of History & Physical: H&P Update referred to the surgeon/provider.    Ready For Surgery From Anesthesia Perspective.     .

## 2024-07-28 PROBLEM — O42.90 PROM (PREMATURE RUPTURE OF MEMBRANES): Status: RESOLVED | Noted: 2024-07-27 | Resolved: 2024-07-28

## 2024-07-28 PROBLEM — J45.909 ASTHMA: Status: ACTIVE | Noted: 2024-07-28

## 2024-07-28 PROBLEM — D64.9 ANEMIA: Status: ACTIVE | Noted: 2024-07-28

## 2024-07-28 LAB
ABO + RH BLD: ABNORMAL
BLD GP AB SCN CELLS X3 SERPL QL: ABNORMAL
CREAT UR-MCNC: 64.8 MG/DL (ref 15–325)
FETAL CELL SCN BLD QL ROSETTE: NORMAL
PROT UR-MCNC: 18 MG/DL (ref 0–15)
PROT/CREAT UR: 0.28 MG/G{CREAT} (ref 0–0.2)

## 2024-07-28 PROCEDURE — 51702 INSERT TEMP BLADDER CATH: CPT

## 2024-07-28 PROCEDURE — 25000003 PHARM REV CODE 250

## 2024-07-28 PROCEDURE — 11000001 HC ACUTE MED/SURG PRIVATE ROOM

## 2024-07-28 PROCEDURE — 36415 COLL VENOUS BLD VENIPUNCTURE: CPT

## 2024-07-28 PROCEDURE — 86850 RBC ANTIBODY SCREEN: CPT

## 2024-07-28 PROCEDURE — 72200005 HC VAGINAL DELIVERY LEVEL II

## 2024-07-28 PROCEDURE — 63600175 PHARM REV CODE 636 W HCPCS

## 2024-07-28 PROCEDURE — 86900 BLOOD TYPING SEROLOGIC ABO: CPT

## 2024-07-28 PROCEDURE — 82570 ASSAY OF URINE CREATININE: CPT

## 2024-07-28 PROCEDURE — 59400 OBSTETRICAL CARE: CPT | Mod: AT,,, | Performed by: OBSTETRICS & GYNECOLOGY

## 2024-07-28 PROCEDURE — 85461 HEMOGLOBIN FETAL: CPT

## 2024-07-28 PROCEDURE — 0UQMXZZ REPAIR VULVA, EXTERNAL APPROACH: ICD-10-PCS | Performed by: INTERNAL MEDICINE

## 2024-07-28 PROCEDURE — 72100003 HC LABOR CARE, EA. ADDL. 8 HRS

## 2024-07-28 PROCEDURE — 0KQM0ZZ REPAIR PERINEUM MUSCLE, OPEN APPROACH: ICD-10-PCS | Performed by: INTERNAL MEDICINE

## 2024-07-28 PROCEDURE — 86901 BLOOD TYPING SEROLOGIC RH(D): CPT

## 2024-07-28 RX ORDER — OXYTOCIN-SODIUM CHLORIDE 0.9% IV SOLN 30 UNIT/500ML 30-0.9/5 UT/ML-%
95 SOLUTION INTRAVENOUS ONCE AS NEEDED
Status: DISCONTINUED | OUTPATIENT
Start: 2024-07-28 | End: 2024-07-30 | Stop reason: HOSPADM

## 2024-07-28 RX ORDER — ACETAMINOPHEN 500 MG
1000 TABLET ORAL ONCE
Status: COMPLETED | OUTPATIENT
Start: 2024-07-28 | End: 2024-07-28

## 2024-07-28 RX ORDER — ACETAMINOPHEN 325 MG/1
650 TABLET ORAL EVERY 6 HOURS PRN
Status: DISCONTINUED | OUTPATIENT
Start: 2024-07-28 | End: 2024-07-30 | Stop reason: HOSPADM

## 2024-07-28 RX ORDER — SODIUM CITRATE AND CITRIC ACID MONOHYDRATE 334; 500 MG/5ML; MG/5ML
30 SOLUTION ORAL ONCE
Status: DISCONTINUED | OUTPATIENT
Start: 2024-07-28 | End: 2024-07-29

## 2024-07-28 RX ORDER — OXYTOCIN-SODIUM CHLORIDE 0.9% IV SOLN 30 UNIT/500ML 30-0.9/5 UT/ML-%
10 SOLUTION INTRAVENOUS ONCE AS NEEDED
Status: COMPLETED | OUTPATIENT
Start: 2024-07-28 | End: 2024-07-28

## 2024-07-28 RX ORDER — DIPHENHYDRAMINE HCL 25 MG
25 CAPSULE ORAL EVERY 6 HOURS PRN
Status: DISCONTINUED | OUTPATIENT
Start: 2024-07-28 | End: 2024-07-28

## 2024-07-28 RX ORDER — DIPHENOXYLATE HYDROCHLORIDE AND ATROPINE SULFATE 2.5; .025 MG/1; MG/1
2 TABLET ORAL EVERY 6 HOURS PRN
Status: DISCONTINUED | OUTPATIENT
Start: 2024-07-28 | End: 2024-07-30 | Stop reason: HOSPADM

## 2024-07-28 RX ORDER — HYDROCODONE BITARTRATE AND ACETAMINOPHEN 5; 325 MG/1; MG/1
1 TABLET ORAL EVERY 4 HOURS PRN
Status: DISCONTINUED | OUTPATIENT
Start: 2024-07-28 | End: 2024-07-28

## 2024-07-28 RX ORDER — OXYCODONE HYDROCHLORIDE 10 MG/1
10 TABLET ORAL EVERY 6 HOURS PRN
Status: DISCONTINUED | OUTPATIENT
Start: 2024-07-28 | End: 2024-07-30 | Stop reason: HOSPADM

## 2024-07-28 RX ORDER — SODIUM CHLORIDE 0.9 % (FLUSH) 0.9 %
10 SYRINGE (ML) INJECTION EVERY 6 HOURS PRN
Status: DISCONTINUED | OUTPATIENT
Start: 2024-07-28 | End: 2024-07-30 | Stop reason: HOSPADM

## 2024-07-28 RX ORDER — ALBUTEROL SULFATE 90 UG/1
2 INHALANT RESPIRATORY (INHALATION) EVERY 6 HOURS PRN
Status: DISCONTINUED | OUTPATIENT
Start: 2024-07-28 | End: 2024-07-30 | Stop reason: HOSPADM

## 2024-07-28 RX ORDER — FAMOTIDINE 10 MG/ML
20 INJECTION INTRAVENOUS ONCE
Status: DISCONTINUED | OUTPATIENT
Start: 2024-07-28 | End: 2024-07-29

## 2024-07-28 RX ORDER — CARBOPROST TROMETHAMINE 250 UG/ML
250 INJECTION, SOLUTION INTRAMUSCULAR
Status: DISCONTINUED | OUTPATIENT
Start: 2024-07-28 | End: 2024-07-30 | Stop reason: HOSPADM

## 2024-07-28 RX ORDER — OXYCODONE HYDROCHLORIDE 5 MG/1
5 TABLET ORAL EVERY 6 HOURS PRN
Status: DISCONTINUED | OUTPATIENT
Start: 2024-07-28 | End: 2024-07-30 | Stop reason: HOSPADM

## 2024-07-28 RX ORDER — DIPHENHYDRAMINE HYDROCHLORIDE 50 MG/ML
25 INJECTION INTRAMUSCULAR; INTRAVENOUS EVERY 4 HOURS PRN
Status: DISCONTINUED | OUTPATIENT
Start: 2024-07-28 | End: 2024-07-30 | Stop reason: HOSPADM

## 2024-07-28 RX ORDER — HYDROCODONE BITARTRATE AND ACETAMINOPHEN 10; 325 MG/1; MG/1
1 TABLET ORAL EVERY 4 HOURS PRN
Status: DISCONTINUED | OUTPATIENT
Start: 2024-07-28 | End: 2024-07-28

## 2024-07-28 RX ORDER — TRANEXAMIC ACID 10 MG/ML
1000 INJECTION, SOLUTION INTRAVENOUS EVERY 30 MIN PRN
Status: DISCONTINUED | OUTPATIENT
Start: 2024-07-28 | End: 2024-07-30 | Stop reason: HOSPADM

## 2024-07-28 RX ORDER — OXYTOCIN-SODIUM CHLORIDE 0.9% IV SOLN 30 UNIT/500ML 30-0.9/5 UT/ML-%
95 SOLUTION INTRAVENOUS ONCE
Status: COMPLETED | OUTPATIENT
Start: 2024-07-28 | End: 2024-07-28

## 2024-07-28 RX ORDER — SIMETHICONE 80 MG
1 TABLET,CHEWABLE ORAL EVERY 6 HOURS PRN
Status: DISCONTINUED | OUTPATIENT
Start: 2024-07-28 | End: 2024-07-30 | Stop reason: HOSPADM

## 2024-07-28 RX ORDER — NITROFURANTOIN 25; 75 MG/1; MG/1
100 CAPSULE ORAL EVERY 12 HOURS
Status: COMPLETED | OUTPATIENT
Start: 2024-07-28 | End: 2024-07-29

## 2024-07-28 RX ORDER — MISOPROSTOL 200 UG/1
800 TABLET ORAL ONCE AS NEEDED
Status: DISCONTINUED | OUTPATIENT
Start: 2024-07-28 | End: 2024-07-30 | Stop reason: HOSPADM

## 2024-07-28 RX ORDER — PRENATAL WITH FERROUS FUM AND FOLIC ACID 3080; 920; 120; 400; 22; 1.84; 3; 20; 10; 1; 12; 200; 27; 25; 2 [IU]/1; [IU]/1; MG/1; [IU]/1; MG/1; MG/1; MG/1; MG/1; MG/1; MG/1; UG/1; MG/1; MG/1; MG/1; MG/1
1 TABLET ORAL DAILY
Status: DISCONTINUED | OUTPATIENT
Start: 2024-07-28 | End: 2024-07-30 | Stop reason: HOSPADM

## 2024-07-28 RX ORDER — FENTANYL/BUPIVACAINE/NS/PF 2MCG/ML-.1
PLASTIC BAG, INJECTION (ML) INJECTION
Status: COMPLETED
Start: 2024-07-28 | End: 2024-07-28

## 2024-07-28 RX ORDER — DIPHENHYDRAMINE HCL 25 MG
25 CAPSULE ORAL EVERY 4 HOURS PRN
Status: DISCONTINUED | OUTPATIENT
Start: 2024-07-28 | End: 2024-07-30 | Stop reason: HOSPADM

## 2024-07-28 RX ORDER — DOCUSATE SODIUM 100 MG/1
200 CAPSULE, LIQUID FILLED ORAL 2 TIMES DAILY PRN
Status: DISCONTINUED | OUTPATIENT
Start: 2024-07-28 | End: 2024-07-30 | Stop reason: HOSPADM

## 2024-07-28 RX ORDER — ONDANSETRON 8 MG/1
8 TABLET, ORALLY DISINTEGRATING ORAL EVERY 8 HOURS PRN
Status: DISCONTINUED | OUTPATIENT
Start: 2024-07-28 | End: 2024-07-30 | Stop reason: HOSPADM

## 2024-07-28 RX ORDER — METHYLERGONOVINE MALEATE 0.2 MG/ML
200 INJECTION INTRAVENOUS ONCE AS NEEDED
Status: DISCONTINUED | OUTPATIENT
Start: 2024-07-28 | End: 2024-07-30 | Stop reason: HOSPADM

## 2024-07-28 RX ORDER — OXYTOCIN 10 [USP'U]/ML
10 INJECTION, SOLUTION INTRAMUSCULAR; INTRAVENOUS ONCE AS NEEDED
Status: DISCONTINUED | OUTPATIENT
Start: 2024-07-28 | End: 2024-07-30 | Stop reason: HOSPADM

## 2024-07-28 RX ORDER — HYDROCORTISONE 25 MG/G
CREAM TOPICAL 3 TIMES DAILY PRN
Status: DISCONTINUED | OUTPATIENT
Start: 2024-07-28 | End: 2024-07-30 | Stop reason: HOSPADM

## 2024-07-28 RX ORDER — IBUPROFEN 600 MG/1
600 TABLET ORAL EVERY 6 HOURS
Status: DISCONTINUED | OUTPATIENT
Start: 2024-07-28 | End: 2024-07-30 | Stop reason: HOSPADM

## 2024-07-28 RX ADMIN — ACETAMINOPHEN 650 MG: 325 TABLET, FILM COATED ORAL at 09:07

## 2024-07-28 RX ADMIN — HYDROCODONE BITARTRATE AND ACETAMINOPHEN 1 TABLET: 5; 325 TABLET ORAL at 07:07

## 2024-07-28 RX ADMIN — ONDANSETRON 8 MG: 8 TABLET, ORALLY DISINTEGRATING ORAL at 10:07

## 2024-07-28 RX ADMIN — OXYTOCIN 95 MILLI-UNITS/MIN: 10 INJECTION, SOLUTION INTRAMUSCULAR; INTRAVENOUS at 05:07

## 2024-07-28 RX ADMIN — NITROFURANTOIN MONOHYDRATE/MACROCRYSTALS 100 MG: 25; 75 CAPSULE ORAL at 09:07

## 2024-07-28 RX ADMIN — Medication 10 ML/HR: at 12:07

## 2024-07-28 RX ADMIN — DEXTROSE MONOHYDRATE 3 MILLION UNITS: 50 INJECTION, SOLUTION INTRAVENOUS at 05:07

## 2024-07-28 RX ADMIN — IBUPROFEN 600 MG: 600 TABLET, FILM COATED ORAL at 07:07

## 2024-07-28 RX ADMIN — DOCUSATE SODIUM 100 MG: 100 CAPSULE, LIQUID FILLED ORAL at 09:07

## 2024-07-28 RX ADMIN — ACETAMINOPHEN 1000 MG: 500 TABLET ORAL at 03:07

## 2024-07-28 RX ADMIN — DEXTROSE MONOHYDRATE 3 MILLION UNITS: 50 INJECTION, SOLUTION INTRAVENOUS at 01:07

## 2024-07-28 RX ADMIN — AMPICILLIN SODIUM 2 G: 2 INJECTION, POWDER, FOR SOLUTION INTRAMUSCULAR; INTRAVENOUS at 03:07

## 2024-07-28 RX ADMIN — FERROUS SULFATE TAB 325 MG (65 MG ELEMENTAL FE) 1 EACH: 325 (65 FE) TAB at 09:07

## 2024-07-28 RX ADMIN — DOCUSATE SODIUM 200 MG: 100 CAPSULE, LIQUID FILLED ORAL at 07:07

## 2024-07-28 RX ADMIN — MISOPROSTOL 800 MCG: 200 TABLET ORAL at 02:07

## 2024-07-28 RX ADMIN — GENTAMICIN SULFATE 273.6 MG: 40 INJECTION, SOLUTION INTRAMUSCULAR; INTRAVENOUS at 04:07

## 2024-07-28 RX ADMIN — OXYTOCIN-SODIUM CHLORIDE 0.9% IV SOLN 30 UNIT/500ML 10 UNITS: 30-0.9/5 SOLUTION at 02:07

## 2024-07-28 RX ADMIN — DEXTROSE MONOHYDRATE 3 MILLION UNITS: 50 INJECTION, SOLUTION INTRAVENOUS at 12:07

## 2024-07-28 RX ADMIN — DIPHENHYDRAMINE HYDROCHLORIDE 25 MG: 25 CAPSULE ORAL at 02:07

## 2024-07-28 RX ADMIN — NITROFURANTOIN MONOHYDRATE/MACROCRYSTALS 100 MG: 25; 75 CAPSULE ORAL at 07:07

## 2024-07-28 RX ADMIN — DEXTROSE MONOHYDRATE 3 MILLION UNITS: 50 INJECTION, SOLUTION INTRAVENOUS at 09:07

## 2024-07-28 NOTE — ANESTHESIA PROCEDURE NOTES
Epidural    Patient location during procedure: OB   Reason for block: primary anesthetic   Reason for block: labor analgesia requested by patient and obstetrician  Diagnosis: IUP   Start time: 7/27/2024 11:38 PM  Timeout: 7/27/2024 11:35 PM  End time: 7/27/2024 11:44 PM  Surgery related to: Vaginal Delivery    Staffing  Performing Provider: Joseph Schuler DO  Authorizing Provider: Joseph Schuler DO    Staffing  Performed by: Joseph Schuler DO  Authorized by: Joseph Schuler DO        Preanesthetic Checklist  Completed: patient identified, IV checked, site marked, risks and benefits discussed, surgical consent, monitors and equipment checked, pre-op evaluation, timeout performed, anesthesia consent given, hand hygiene performed and patient being monitored  Preparation  Patient position: sitting  Prep: ChloraPrep  Patient monitoring: Pulse Ox  Reason for block: primary anesthetic   Epidural  Skin Anesthetic: lidocaine 1%  Skin Wheal: 3 mL  Administration type: continuous  Approach: midline  Interspace: L3-4    Injection technique: DANISH air  Needle and Epidural Catheter  Needle type: Tuohy   Needle gauge: 17  Needle length: 3.5 inches  Needle insertion depth: 7 cm  Catheter type: Teak  Catheter size: 19 G  Catheter at skin depth: 13 cm  Insertion Attempts: 1  Test dose: 3 mL of lidocaine 1.5% with Epi 1-to-200,000  Additional Documentation: incremental injection, negative aspiration for heme and CSF, no paresthesia on injection, no signs/symptoms of IV or SA injection, no significant pain on injection and no significant complaints from patient  Needle localization: anatomical landmarks  Medications:  Volume per aspiration: 5 mL  Time between aspirations: 5 minutes   Assessment  Ease of block: easy  Patient's tolerance of the procedure: comfortable throughout block and no complaints No inadvertent dural puncture with Tuohy.  Dural puncture performed with spinal needle.

## 2024-07-29 PROBLEM — D62 ABLA (ACUTE BLOOD LOSS ANEMIA): Status: ACTIVE | Noted: 2024-07-28

## 2024-07-29 PROBLEM — O41.1290 CHORIOAMNIONITIS: Status: ACTIVE | Noted: 2024-07-29

## 2024-07-29 LAB
ABO + RH BLD: ABNORMAL
ALBUMIN SERPL BCP-MCNC: 2.1 G/DL (ref 3.5–5.2)
ALP SERPL-CCNC: 101 U/L (ref 55–135)
ALT SERPL W/O P-5'-P-CCNC: 12 U/L (ref 10–44)
ANION GAP SERPL CALC-SCNC: 6 MMOL/L (ref 8–16)
AST SERPL-CCNC: 27 U/L (ref 10–40)
BASOPHILS # BLD AUTO: 0.06 K/UL (ref 0–0.2)
BASOPHILS NFR BLD: 0.5 % (ref 0–1.9)
BILIRUB SERPL-MCNC: 0.3 MG/DL (ref 0.1–1)
BLD GP AB SCN CELLS X3 SERPL QL: ABNORMAL
BUN SERPL-MCNC: 8 MG/DL (ref 6–20)
CALCIUM SERPL-MCNC: 8.3 MG/DL (ref 8.7–10.5)
CHLORIDE SERPL-SCNC: 111 MMOL/L (ref 95–110)
CO2 SERPL-SCNC: 19 MMOL/L (ref 23–29)
CREAT SERPL-MCNC: 0.8 MG/DL (ref 0.5–1.4)
DIFFERENTIAL METHOD BLD: ABNORMAL
EOSINOPHIL # BLD AUTO: 0.3 K/UL (ref 0–0.5)
EOSINOPHIL NFR BLD: 2 % (ref 0–8)
ERYTHROCYTE [DISTWIDTH] IN BLOOD BY AUTOMATED COUNT: 14 % (ref 11.5–14.5)
EST. GFR  (NO RACE VARIABLE): >60 ML/MIN/1.73 M^2
FETAL CELL SCN BLD QL ROSETTE: NORMAL
GLUCOSE SERPL-MCNC: 105 MG/DL (ref 70–110)
HCT VFR BLD AUTO: 21.8 % (ref 37–48.5)
HGB BLD-MCNC: 7.7 G/DL (ref 12–16)
IMM GRANULOCYTES # BLD AUTO: 0.46 K/UL (ref 0–0.04)
IMM GRANULOCYTES NFR BLD AUTO: 3.7 % (ref 0–0.5)
INJECT RH IG VOL PATIENT: NORMAL ML
LYMPHOCYTES # BLD AUTO: 1.7 K/UL (ref 1–4.8)
LYMPHOCYTES NFR BLD: 13.4 % (ref 18–48)
MCH RBC QN AUTO: 31 PG (ref 27–31)
MCHC RBC AUTO-ENTMCNC: 35.3 G/DL (ref 32–36)
MCV RBC AUTO: 88 FL (ref 82–98)
MONOCYTES # BLD AUTO: 1.1 K/UL (ref 0.3–1)
MONOCYTES NFR BLD: 8.9 % (ref 4–15)
NEUTROPHILS # BLD AUTO: 8.8 K/UL (ref 1.8–7.7)
NEUTROPHILS NFR BLD: 71.5 % (ref 38–73)
NRBC BLD-RTO: 0 /100 WBC
PLATELET # BLD AUTO: 153 K/UL (ref 150–450)
PMV BLD AUTO: 10 FL (ref 9.2–12.9)
POTASSIUM SERPL-SCNC: 3.8 MMOL/L (ref 3.5–5.1)
PROT SERPL-MCNC: 4.7 G/DL (ref 6–8.4)
RBC # BLD AUTO: 2.48 M/UL (ref 4–5.4)
SODIUM SERPL-SCNC: 136 MMOL/L (ref 136–145)
WBC # BLD AUTO: 12.3 K/UL (ref 3.9–12.7)

## 2024-07-29 PROCEDURE — 36415 COLL VENOUS BLD VENIPUNCTURE: CPT | Performed by: OBSTETRICS & GYNECOLOGY

## 2024-07-29 PROCEDURE — 3E0234Z INTRODUCTION OF SERUM, TOXOID AND VACCINE INTO MUSCLE, PERCUTANEOUS APPROACH: ICD-10-PCS | Performed by: INTERNAL MEDICINE

## 2024-07-29 PROCEDURE — 94761 N-INVAS EAR/PLS OXIMETRY MLT: CPT

## 2024-07-29 PROCEDURE — 25000003 PHARM REV CODE 250

## 2024-07-29 PROCEDURE — 63600519 RHOGAM PHARM REV CODE 636 ALT 250 W HCPCS

## 2024-07-29 PROCEDURE — 85025 COMPLETE CBC W/AUTO DIFF WBC: CPT

## 2024-07-29 PROCEDURE — 85461 HEMOGLOBIN FETAL: CPT | Performed by: OBSTETRICS & GYNECOLOGY

## 2024-07-29 PROCEDURE — 80053 COMPREHEN METABOLIC PANEL: CPT

## 2024-07-29 PROCEDURE — 86900 BLOOD TYPING SEROLOGIC ABO: CPT | Performed by: OBSTETRICS & GYNECOLOGY

## 2024-07-29 PROCEDURE — 11000001 HC ACUTE MED/SURG PRIVATE ROOM

## 2024-07-29 PROCEDURE — 86850 RBC ANTIBODY SCREEN: CPT | Performed by: OBSTETRICS & GYNECOLOGY

## 2024-07-29 PROCEDURE — 99900035 HC TECH TIME PER 15 MIN (STAT)

## 2024-07-29 RX ADMIN — OXYCODONE HYDROCHLORIDE 10 MG: 10 TABLET ORAL at 02:07

## 2024-07-29 RX ADMIN — FERROUS SULFATE TAB 325 MG (65 MG ELEMENTAL FE) 1 EACH: 325 (65 FE) TAB at 08:07

## 2024-07-29 RX ADMIN — DOCUSATE SODIUM 200 MG: 100 CAPSULE, LIQUID FILLED ORAL at 08:07

## 2024-07-29 RX ADMIN — PRENATAL VIT W/ FE FUMARATE-FA TAB 27-0.8 MG 1 TABLET: 27-0.8 TAB at 08:07

## 2024-07-29 RX ADMIN — IBUPROFEN 600 MG: 600 TABLET, FILM COATED ORAL at 08:07

## 2024-07-29 RX ADMIN — IBUPROFEN 600 MG: 600 TABLET, FILM COATED ORAL at 02:07

## 2024-07-29 RX ADMIN — HUMAN RHO(D) IMMUNE GLOBULIN 300 MCG: 300 INJECTION, SOLUTION INTRAMUSCULAR at 05:07

## 2024-07-29 RX ADMIN — NITROFURANTOIN MONOHYDRATE/MACROCRYSTALS 100 MG: 25; 75 CAPSULE ORAL at 08:07

## 2024-07-29 RX ADMIN — SIMETHICONE 80 MG: 80 TABLET, CHEWABLE ORAL at 08:07

## 2024-07-29 NOTE — ANESTHESIA POSTPROCEDURE EVALUATION
Anesthesia Post Evaluation    Patient: Almas Colon    Procedure(s) Performed: * No procedures listed *    Final Anesthesia Type: epidural      Patient location during evaluation: labor & delivery  Patient participation: Yes- Able to Participate  Level of consciousness: awake and alert  Post-procedure vital signs: reviewed and stable  Pain management: adequate  Airway patency: patent  MONIK mitigation strategies: Multimodal analgesia  PONV status at discharge: No PONV  Anesthetic complications: no      Cardiovascular status: stable and blood pressure returned to baseline  Respiratory status: unassisted, spontaneous ventilation and room air  Hydration status: euvolemic  Follow-up not needed.              Vitals Value Taken Time   /82 07/29/24 0335   Temp 36.9 °C (98.5 °F) 07/29/24 0335   Pulse 80 07/29/24 0335   Resp 18 07/29/24 0335   SpO2 98 % 07/29/24 0800         No case tracking events are documented in the log.      Pain/Clinton Score: Pain Rating Prior to Med Admin: 7 (7/29/2024  2:36 AM)  Pain Rating Post Med Admin: 0 (7/29/2024  3:30 AM)

## 2024-07-30 ENCOUNTER — TELEPHONE (OUTPATIENT)
Dept: OBSTETRICS AND GYNECOLOGY | Facility: CLINIC | Age: 24
End: 2024-07-30
Payer: COMMERCIAL

## 2024-07-30 VITALS
OXYGEN SATURATION: 98 % | SYSTOLIC BLOOD PRESSURE: 123 MMHG | BODY MASS INDEX: 36.37 KG/M2 | HEART RATE: 81 BPM | DIASTOLIC BLOOD PRESSURE: 65 MMHG | TEMPERATURE: 98 F | RESPIRATION RATE: 18 BRPM | WEIGHT: 211.88 LBS

## 2024-07-30 PROCEDURE — 25000003 PHARM REV CODE 250

## 2024-07-30 PROCEDURE — 94761 N-INVAS EAR/PLS OXIMETRY MLT: CPT

## 2024-07-30 PROCEDURE — 99900035 HC TECH TIME PER 15 MIN (STAT)

## 2024-07-30 RX ORDER — ACETAMINOPHEN 325 MG/1
650 TABLET ORAL EVERY 6 HOURS PRN
Qty: 30 TABLET | Refills: 0 | Status: SHIPPED | OUTPATIENT
Start: 2024-07-30

## 2024-07-30 RX ORDER — IBUPROFEN 600 MG/1
600 TABLET ORAL EVERY 8 HOURS PRN
Qty: 30 TABLET | Refills: 0 | Status: SHIPPED | OUTPATIENT
Start: 2024-07-30

## 2024-07-30 RX ORDER — DOCUSATE SODIUM 100 MG/1
100 CAPSULE, LIQUID FILLED ORAL 2 TIMES DAILY PRN
Qty: 60 CAPSULE | Refills: 0 | Status: SHIPPED | OUTPATIENT
Start: 2024-07-30

## 2024-07-30 RX ADMIN — IBUPROFEN 600 MG: 600 TABLET, FILM COATED ORAL at 02:07

## 2024-07-30 RX ADMIN — DOCUSATE SODIUM 200 MG: 100 CAPSULE, LIQUID FILLED ORAL at 09:07

## 2024-07-30 RX ADMIN — PRENATAL VIT W/ FE FUMARATE-FA TAB 27-0.8 MG 1 TABLET: 27-0.8 TAB at 09:07

## 2024-07-30 RX ADMIN — FERROUS SULFATE TAB 325 MG (65 MG ELEMENTAL FE) 1 EACH: 325 (65 FE) TAB at 09:07

## 2024-07-30 RX ADMIN — IBUPROFEN 600 MG: 600 TABLET, FILM COATED ORAL at 09:07

## 2024-07-30 NOTE — TELEPHONE ENCOUNTER
----- Message from Maggie Lowe RN sent at 7/30/2024 10:02 AM CDT -----  Hi, can you please call ms burns to set up a post partum BP&mood check appt on or before Tuesday 8/6.  She has gest HTN and does NOT have a bp cuff at home. Thank you

## 2024-08-01 ENCOUNTER — PATIENT MESSAGE (OUTPATIENT)
Dept: OBSTETRICS AND GYNECOLOGY | Facility: OTHER | Age: 24
End: 2024-08-01
Payer: COMMERCIAL

## 2024-08-04 ENCOUNTER — TELEPHONE (OUTPATIENT)
Dept: OBSTETRICS AND GYNECOLOGY | Facility: OTHER | Age: 24
End: 2024-08-04
Payer: COMMERCIAL

## 2024-08-05 ENCOUNTER — PATIENT MESSAGE (OUTPATIENT)
Dept: OBSTETRICS AND GYNECOLOGY | Facility: CLINIC | Age: 24
End: 2024-08-05
Payer: COMMERCIAL

## 2024-08-06 ENCOUNTER — POSTPARTUM VISIT (OUTPATIENT)
Dept: OBSTETRICS AND GYNECOLOGY | Facility: CLINIC | Age: 24
End: 2024-08-06
Payer: COMMERCIAL

## 2024-08-06 VITALS
HEIGHT: 64 IN | SYSTOLIC BLOOD PRESSURE: 124 MMHG | WEIGHT: 194.44 LBS | DIASTOLIC BLOOD PRESSURE: 72 MMHG | BODY MASS INDEX: 33.2 KG/M2

## 2024-08-06 DIAGNOSIS — Z01.30 BP CHECK: Primary | ICD-10-CM

## 2024-08-06 PROCEDURE — 99999 PR PBB SHADOW E&M-EST. PATIENT-LVL III: CPT | Mod: PBBFAC,,, | Performed by: NURSE PRACTITIONER

## 2024-08-07 ENCOUNTER — TELEPHONE (OUTPATIENT)
Dept: OBSTETRICS AND GYNECOLOGY | Facility: CLINIC | Age: 24
End: 2024-08-07
Payer: COMMERCIAL

## 2024-08-07 DIAGNOSIS — D64.9 ANEMIA, UNSPECIFIED TYPE: Primary | ICD-10-CM

## 2024-08-08 ENCOUNTER — LAB VISIT (OUTPATIENT)
Dept: LAB | Facility: HOSPITAL | Age: 24
End: 2024-08-08
Attending: OBSTETRICS & GYNECOLOGY
Payer: COMMERCIAL

## 2024-08-08 DIAGNOSIS — D64.9 ANEMIA, UNSPECIFIED TYPE: ICD-10-CM

## 2024-08-08 LAB
BASOPHILS # BLD AUTO: 0.08 K/UL (ref 0–0.2)
BASOPHILS NFR BLD: 1 % (ref 0–1.9)
DIFFERENTIAL METHOD BLD: ABNORMAL
EOSINOPHIL # BLD AUTO: 0.5 K/UL (ref 0–0.5)
EOSINOPHIL NFR BLD: 6.5 % (ref 0–8)
ERYTHROCYTE [DISTWIDTH] IN BLOOD BY AUTOMATED COUNT: 14.3 % (ref 11.5–14.5)
HCT VFR BLD AUTO: 32.6 % (ref 37–48.5)
HGB BLD-MCNC: 10.6 G/DL (ref 12–16)
IMM GRANULOCYTES # BLD AUTO: 0.27 K/UL (ref 0–0.04)
IMM GRANULOCYTES NFR BLD AUTO: 3.4 % (ref 0–0.5)
LYMPHOCYTES # BLD AUTO: 1.9 K/UL (ref 1–4.8)
LYMPHOCYTES NFR BLD: 23.9 % (ref 18–48)
MCH RBC QN AUTO: 30.8 PG (ref 27–31)
MCHC RBC AUTO-ENTMCNC: 32.5 G/DL (ref 32–36)
MCV RBC AUTO: 95 FL (ref 82–98)
MONOCYTES # BLD AUTO: 0.5 K/UL (ref 0.3–1)
MONOCYTES NFR BLD: 6.3 % (ref 4–15)
NEUTROPHILS # BLD AUTO: 4.7 K/UL (ref 1.8–7.7)
NEUTROPHILS NFR BLD: 58.9 % (ref 38–73)
NRBC BLD-RTO: 0 /100 WBC
PLATELET # BLD AUTO: 412 K/UL (ref 150–450)
PMV BLD AUTO: 9.4 FL (ref 9.2–12.9)
RBC # BLD AUTO: 3.44 M/UL (ref 4–5.4)
WBC # BLD AUTO: 7.99 K/UL (ref 3.9–12.7)

## 2024-08-08 PROCEDURE — 85025 COMPLETE CBC W/AUTO DIFF WBC: CPT | Performed by: OBSTETRICS & GYNECOLOGY

## 2024-08-08 PROCEDURE — 36415 COLL VENOUS BLD VENIPUNCTURE: CPT | Mod: PN | Performed by: OBSTETRICS & GYNECOLOGY

## 2024-08-13 ENCOUNTER — OFFICE VISIT (OUTPATIENT)
Dept: PSYCHIATRY | Facility: CLINIC | Age: 24
End: 2024-08-13
Payer: COMMERCIAL

## 2024-08-13 DIAGNOSIS — F43.23 ADJUSTMENT DISORDER WITH MIXED ANXIETY AND DEPRESSED MOOD: Primary | ICD-10-CM

## 2024-08-13 DIAGNOSIS — F90.2 ATTENTION DEFICIT HYPERACTIVITY DISORDER (ADHD), COMBINED TYPE: ICD-10-CM

## 2024-08-13 DIAGNOSIS — F41.1 GAD (GENERALIZED ANXIETY DISORDER): ICD-10-CM

## 2024-08-13 PROCEDURE — 1160F RVW MEDS BY RX/DR IN RCRD: CPT | Mod: CPTII,95,, | Performed by: PHYSICIAN ASSISTANT

## 2024-08-13 PROCEDURE — 1111F DSCHRG MED/CURRENT MED MERGE: CPT | Mod: CPTII,95,, | Performed by: PHYSICIAN ASSISTANT

## 2024-08-13 PROCEDURE — 1159F MED LIST DOCD IN RCRD: CPT | Mod: CPTII,95,, | Performed by: PHYSICIAN ASSISTANT

## 2024-08-13 PROCEDURE — G2211 COMPLEX E/M VISIT ADD ON: HCPCS | Mod: 95,,, | Performed by: PHYSICIAN ASSISTANT

## 2024-08-13 PROCEDURE — 99214 OFFICE O/P EST MOD 30 MIN: CPT | Mod: 95,,, | Performed by: PHYSICIAN ASSISTANT

## 2024-08-13 NOTE — PROGRESS NOTES
The patient location is: at home in [place], [place]  The chief complaint leading to consultation is: dep/anxiety    Visit type: audiovisual    Face to Face time with patient: 15  30 minutes of total time spent on the encounter, which includes face to face time and non-face to face time preparing to see the patient (eg, review of tests), Obtaining and/or reviewing separately obtained history, Documenting clinical information in the electronic or other health record, Independently interpreting results (not separately reported) and communicating results to the patient/family/caregiver, or Care coordination (not separately reported).     Each patient to whom he or she provides medical services by telemedicine is:  (1) informed of the relationship between the physician and patient and the respective role of any other health care provider with respect to management of the patient; and (2) notified that he or she may decline to receive medical services by telemedicine and may withdraw from such care at any time.    Outpatient Psychiatry Follow-Up Visit (MD/NP)    8/13/2024    Clinical Status of Patient:  Outpatient (Ambulatory)    Chief Complaint:  Almas Colon is a 24 y.o. female who presents today for follow-up of depression, anxiety and attention problems.  Met with patient.     Interval History and Content of Current Session:  Interim Events/Subjective Report/Content of Current Session:  Almas is seen virtually today for medication follow-up.  She gave birth to her daughter, Romina, at the end of July.  We speak to this in detail.  She states she is adjusting to motherhood well.  Has had a positive mindset slept despite lack of sleep.  She feels very supported from her significant other/father of her daughter.  She states she has not had any issues tending to her needs or her daughter's needs.  She states she feels a bit more emotional but not extreme.  It has not impacted bonding with her daughter.  She  would like to continue with sertraline 25 mg in will consider increase in the future.  Denies suicidal or homicidal ideation.  Other complaints today.    FROM PREVIOUS HPI  Almas is seen virtually today for medication follow-up.  Reports that she is doing mostly well, her baby will be arriving the first week of August.  Currently at home off of work.  States she has been mostly doing well though feels quite sleepy.  She has been enjoying being able to relax.  Anxiety has been fine felt that she was down for few days but that has since went away.  States that she feels well today.  She would like to continue with low-dose sertraline with potential plan to increase the medication in the future.  Will have short term follow-up after her baby is born.  Denies suicidal or homicidal ideation.  Feels optimistic and excited about the birth - her home is ready for arrival.  No other complaints today.          8/13/2024     3:03 PM 7/23/2024    11:53 AM 6/18/2024    10:59 AM   GAD7   1. Feeling nervous, anxious, or on edge? 1 0 0   2. Not being able to stop or control worrying? 0 0 0   3. Worrying too much about different things? 0 0 0   4. Trouble relaxing? 0 0 0   5. Being so restless that it is hard to sit still? 0 0 0   6. Becoming easily annoyed or irritable? 0 0 0   7. Feeling afraid as if something awful might happen? 0 0 0   8. If you checked off any problems, how difficult have these problems made it for you to do your work, take care of things at home, or get along with other people? 0 0    MATHEW-7 Score 1 0 0         8/13/2024     3:04 PM   PHQ-9 Depression Patient Health Questionnaire   Patient agreed to terms: Yes   Little interest or pleasure in doing things 0   Feeling down, depressed, or hopeless 0   Trouble falling or staying asleep, or sleeping too much 0   Feeling tired or having little energy 1   Poor appetite or overeating 0   Feeling bad about yourself - or that you are a failure or have let yourself  or your family down 0   Trouble concentrating on things, such as reading the newspaper or watching television 1   Moving or speaking so slowly that other people could have noticed. Or the opposite - being so fidgety or restless that you have been moving around a lot more than usual 0   Thoughts that you would be better off dead, or of hurting yourself in some way 0   PHQ-9 Total Score 2   If you checked off any problems, how difficult have these problems made it for you to do your work, take care of things at home, or get along with other people? Somewhat difficult   Interpretation Minimal or None         Outpatient Psychiatry Initial Visit (MD/NP)     1/28/2021     Almas Colon, a 20 y.o. female, presenting for initial evaluation visit. Met with patient.     Reason for Encounter: Referral from Dina Thayer NP. Patient complains of inattention/concentration.     History of Present Illness:   This is a healthy 20-year-old female, past medical history of well-controlled asthma, who presents for initial evaluation today.  She reports that she has been prescribed Vyvanse 20 mg daily for attention/concentration.  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  Has been on winter break so has not been taking the Vyvanse.  She does report that it has helped her mood, attention, focus, complete schoolwork as well as daily tasks.  She is considering antidepressant therapy but is not sure if that is necessary at this point.  She does start school next week and will see how she is doing on Vyvanse 40 mg daily and go from there.  She is unsure if she wants to stay on stimulant long-term.  Discussed Strattera for nonstimulant treatment of ADHD.  She will consider this medication as well.  Denies suicidal or homicidal ideation.  Appetite and sleep are stable.  Has supportive family and friends.     She states her mood has been stable.  She is entering her senior year of college.  Was going to graduate  May 2021 but is pushing that back to December 2021.  She is looking for engineering internships.  Program is challenging but she enjoys it.  Also established with Jennifer Jackson LCSW for psychotherapy.  Financial situation is a bit stressed.  Living with her mother.  Has been attempting to increase physical activity which does cause her to feel better.  Patient describes that she was on sertraline years ago and did well with that but it did cause her to have some questionable decision making.     Depression symptoms: patient reports little interest or pleasure in doing things; feeling down, depressed, or hopeless; trouble falling asleep or staying asleep, or sleeping too much; feeling tired or having little energy; poor appetite/overeating; trouble concentrating. Denies thoughts of self-harm or suicide.     Anxiety symptoms: reports feeling nervous, anxious, or on edge; not being able to stop or control worrying; worrying too much about different things; becoming easily annoyed or irritable.     Natalie/Hypomania symptoms: denies periods of time in which she has gone without sleep with elevated or irritable mood and impulsive decision making.  Mood disorder questionnaire, she does admit to irritability, racing thoughts, distractibility at times more social or outgoing.     Psychosis: denies     Attention/Concentration:  Improved with Vyvanse     Body Image/Hx of eating disorders: denies     Suicidal ideation and risk: denies, no history of suicide attempts     Homicidal/Violient ideation and risk: denies     Sleep: not sleeping that well, trying to get back into sleep schedule, used to take melatonin      Appetite:  Adequate     GAD7:  Five, somewhat difficult  PHQ9:  13, extremely difficult  MDQ:  Negative screen     Past Psychiatric History:  Prior diagnoses: ADHD, depressive symptoms in the past     Inpatient psychiatric treatment:  No history of inpatient or intensive outpatient     Outpatient psychiatric  treatment:  Primary care provider     Prior medications: zoloft, wellbutrin, vyvanse     Current medications: vyvanse     Prior suicide attempts:  None     Prior history self harm:  No history of self-injurious behavior     Prior psychotherapy:  Currently involved with Jennifer Jackson LCSW        Allergies:       Review of patient's allergies indicates:   Allergen Reactions    Ceftriaxone Hives    Cefzil [cefprozil] Hives         Past Medical History:       Past Medical History:   Diagnosis Date    Acne      Asthma      Depression      Kidney stone      Seasonal allergic rhinitis 5/30/2018   G0     History TBI: denies  History seizures: denies     Past Surgical History:        Past Surgical History:   Procedure Laterality Date    TONSILLECTOMY             Family History:   Suicide: denies  Substance use: denies  Bipolar disorder/Psychotic disorder: denies  Anxiety: mother  Depression: mother     Social History:  Childhood: born in [place], raised by biological mother and father. Living with mom now. Father is staying with her grandmother. Establishing a relationship with her father. Two half sisters and step-sister. Doing well. Good relationship.  Marital status: supportive boyfriend  Children: none  Resides: [place]  Occupation: not working right now, going to try find part time job  Hobbies: trying to find a hobby  Yarsanism: none  Education level: senior in college  : denies  Legal: denies  History of abuse/trauma: a bit of emotional trauma, working through in therapy     Substance History:  Tobacco: denies  Alcohol: occasionally, rarely  Drug use: denies  Caffeine: cup a day of coffee      Rehab:  Prior/current AA: denies    Review of Systems   PSYCHIATRIC: Pertinant items are noted in the narrative.  RESPIRATORY: No shortness of breath.  CARDIOVASCULAR: No tachycardia or chest pain.  GASTROINTESTINAL: No nausea, vomiting, pain, constipation or diarrhea.    Past Medical, Family and Social History:  The patient's past medical, family and social history have been reviewed and updated as appropriate within the electronic medical record - see encounter notes.    Compliance: yes    Side effects: none    Risk Parameters:  Patient reports no suicidal ideation  Patient reports no homicidal ideation  Patient reports no self-injurious behavior  Patient reports no violent behavior    Exam (detailed: at least 9 elements; comprehensive: all 15 elements)   Constitutional  Vitals:  Most recent vital signs, dated less than 90 days prior to this appointment, were reviewed.   There were no vitals filed for this visit.       General:  unremarkable, age appropriate, casually dressed     Musculoskeletal  Muscle Strength/Tone:  no dyskinesia   Gait & Station:  Virtual visit     Psychiatric  Speech:  no latency; no press   Mood & Affect:  good  congruent and appropriate   Thought Process:  normal and logical   Associations:  intact   Thought Content:  normal, no suicidality, no homicidality, delusions, or paranoia   Insight:  intact   Judgement: behavior is adequate to circumstances   Orientation:  grossly intact   Memory: intact for content of interview   Language: grossly intact   Attention Span & Concentration:  able to focus   Fund of Knowledge:  intact and appropriate to age and level of education     Assessment and Diagnosis   Status/Progress: Based on the examination today, the patient's problem(s) is/are improved.  New problems have not been presented today.   Co-morbidities, Diagnostic uncertainty and Lack of compliance are not complicating management of the primary condition.     General Impression:   Adjustment disorder  ADHD, combined presentation type    Intervention/Counseling/Treatment Plan   Medication Management: Continue current medications. The risks and benefits of medication were discussed with the patient.  Counseling provided with patient as follows: importance of compliance with chosen treatment options was  emphasized, risks and benefits of treatment options, including medications, were discussed with the patient, risk factor reduction, prognosis    Almas is seen today for medication follow up.  Based on assessment:    Continue sertraline 25 mg once daily for mood/anxiety. Will have very close f/u with this patient.     Please go to emergency department if feeling as though you are a harm to yourself or others or if you are in crisis.     Please call the clinic to report any worsening of symptoms or problems associated with medication.    This medication has US boxed warning that states there may be an increased risk of suicidal thoughts and behavior in pediatric and young adult patients in short-term studies. Closely monitor all antidepressant-treated patients for clinical worsening, and for emergence of suicidal thoughts and behaviors.    Discussed with patient informed consent, risks vs. benefits, alternative treatments, side effect profile and the inherent unpredictability of individual responses to these treatments. The patient expresses understanding of the above and displays the capacity to agree with this current plan and had no other questions.      Return to Clinic: as scheduled, as needed

## 2024-09-05 ENCOUNTER — OFFICE VISIT (OUTPATIENT)
Dept: DERMATOLOGY | Facility: CLINIC | Age: 24
End: 2024-09-05
Payer: COMMERCIAL

## 2024-09-05 DIAGNOSIS — L70.0 ACNE VULGARIS: Primary | ICD-10-CM

## 2024-09-05 DIAGNOSIS — L90.6: ICD-10-CM

## 2024-09-05 PROCEDURE — G2211 COMPLEX E/M VISIT ADD ON: HCPCS | Mod: S$GLB,,, | Performed by: DERMATOLOGY

## 2024-09-05 PROCEDURE — 99213 OFFICE O/P EST LOW 20 MIN: CPT | Mod: S$GLB,,, | Performed by: DERMATOLOGY

## 2024-09-05 PROCEDURE — 1160F RVW MEDS BY RX/DR IN RCRD: CPT | Mod: CPTII,S$GLB,, | Performed by: DERMATOLOGY

## 2024-09-05 PROCEDURE — 1159F MED LIST DOCD IN RCRD: CPT | Mod: CPTII,S$GLB,, | Performed by: DERMATOLOGY

## 2024-09-05 NOTE — PROGRESS NOTES
Patient Information  Name: Almas Colon  : 2000  MRN: [id number]     Referring Physician:  No ref. provider found   Primary Care Physician:  Dina Thayer NP   Date of Visit: 2024      Subjective:     History of Present lllness:    Almas Colon is a 24 y.o. female who presents with a chief complaint of acne.    Acne  Location: face, back and chest  Signs/Symptoms: breakouts   Symptom course: improving  Current treatment: none, wants refill on tretinoin    Also would like to discuss treatment options for stretch marks on abdomen after pregnancy     Patient was last seen: 10/20/2022.  Prior notes by myself reviewed.   Clinical documentation obtained by nursing staff reviewed.    Review of Systems    Objective:   Physical Exam   Constitutional: She appears well-developed and well-nourished. No distress.   Neurological: She is alert and oriented to person, place, and time. She is not disoriented.   Psychiatric: She has a normal mood and affect.   Skin:   Areas Examined (abnormalities noted in diagram):   Head / Face Inspection Performed            Diagram Legend     Erythematous scaling macule/papule c/w actinic keratosis       Vascular papule c/w angioma      Pigmented verrucoid papule/plaque c/w seborrheic keratosis      Yellow umbilicated papule c/w sebaceous hyperplasia      Irregularly shaped tan macule c/w lentigo     1-2 mm smooth white papules consistent with Milia      Movable subcutaneous cyst with punctum c/w epidermal inclusion cyst      Subcutaneous movable cyst c/w pilar cyst      Firm pink to brown papule c/w dermatofibroma      Pedunculated fleshy papule(s) c/w skin tag(s)      Evenly pigmented macule c/w junctional nevus     Mildly variegated pigmented, slightly irregular-bordered macule c/w mildly atypical nevus      Flesh colored to evenly pigmented papule c/w intradermal nevus       Pink pearly papule/plaque c/w basal cell carcinoma      Erythematous  hyperkeratotic cursted plaque c/w SCC      Surgical scar with no sign of skin cancer recurrence      Open and closed comedones      Inflammatory papules and pustules      Verrucoid papule consistent consistent with wart     Erythematous eczematous patches and plaques     Dystrophic onycholytic nail with subungual debris c/w onychomycosis     Umbilicated papule    Erythematous-base heme-crusted tan verrucoid plaque consistent with inflamed seborrheic keratosis     Erythematous Silvery Scaling Plaque c/w Psoriasis     See annotation    No images are attached to the encounter or orders placed in the encounter.      [] Data reviewed  [] Prior external notes reviewed  [] Independent review of test  [] Management discussed with another provider  [] Independent historian    Assessment / Plan:        Acne vulgaris  - chronic problem, not at treatment goal  Recommend a pea-sized amount of OTC adapalene 0.1% gel to entire face at bedtime. (Brands: Differin, La Roche Posay, and others)  Topical retinoids may make the skin dry, red, and irritated. You may moisturize daily with a non-comedogenic moisturizer. If still dry, would recommend using the retinoid every other night or every third night as tolerated. Avoid using around corners of eyes, nose, and mouth.  Recommend using a daily broad-spectrum sunscreen with SPF of 30 or higher. Seek shade and wear sun-protective clothing/hat.  Written instructions are provided in the Visit Summary.    Striae gravidarum  Reassurance provided.  Discussed laser, microneedling, topical retinoid, and topical peptide serum as treatment options.      Follow up in about 4 months (around 1/5/2025).      Allyson Mortensen MD, FAAD  [facility]

## 2024-09-05 NOTE — PATIENT INSTRUCTIONS
Recommend a pea-sized amount of OTC adapalene 0.1% gel to entire face at bedtime. (Brands: Differin, La Roche Posay, and others)      RETINOIDS   Your Doctor has prescribed a topical retinoid for your skin.  A retinoid is a vitamin A-derived product used to treat a variety of skin conditions including acne, actinic keratoses (pre-skin cancers), uneven pigmentation from sun damage, fine lines and wrinkles, and enlarged pores.      How do they work?   Retinoids increase skin cell turn over from the normal 30 days to five or six days, minimizing clogged pores--the major factor in acne.  Retinoids can also repair the DNA in cells damaged by the sun, helping to even out skin pigmentation and clear pre-skin cancers.  They stimulate collagen remodeling and repair, reversing signs of maturing skin.   They can shrink oil glands and minimize the appearance of large pores. These effects can not be appreciated unless the medication is used on a consistent basis!    How do I use a retinoid?   After washing with a mild cleanser (CeraVe, Cetaphil, Neutrogena, Purpose), the skin should be moisturized with a non-retinol containing moisturizer such as Cerave cream or PM lotion. Then, a thin layer of medication is applied to the forehead, nose, cheeks, and chin (and around eyes if treating fine wrinkles) at night.  The amount of medication needed to cover the entire face should be no more than the size of a green pea. Irritation around the eyes can be treated with Vaseline at night.     What if my skin appears dry, red, and is peeling?   Retinoids do not cause dry skin but rather they cause the top layer of the skin to shed, giving an appearance of dry skin.  In fact, new healthy skin cells are replacing the older, damaged cells on the surface. This usually occurs the first 2-4 weeks as the skin is adjusting to the medication.  It is reasonable to use the medication every other night or even every three nights until your skin adjusts.   You can use a MILD exfoliant to remove the peeling skin (Aveeno daily clarifying pads, Aqua glycolic face cream) and can apply a moisturizer throughout the day as needed. Retinoids come in a variety of strengths and vehicles, and your doctor can find one best for you.  If you cannot tolerate prescription strength retinoids, over the counter products with retinol may be beneficial (Olay ProX wrinkle cream, CELESTE deep wrinkle cream, Green Cream at [employer])    Will my skin be more sensitive in the sun?   You will need to use a sunscreen with SPF 30 daily.  Retinoids will cause the outermost layer of the skin to be thinner and thus more sensitive to ultraviolet rays.  However, remember that over time, retinoids actually make the skin thicker by enhancing collagen deposition which protects the skin from sun damage.     When will I see results?   If you are using a retinoid for acne, you should see some improvement in 6-8 weeks.  Do not be alarmed if you find that your acne gets WORSE before it gets better- KEEP USING THE MEDICATION- this is a normal response and your acne will improve if you can stick with it.     If you are using the medication for anti-aging and skin dyspigmentation, you may see results in 3 months, but most effects are not visible until 6 months.  Retinoids are clinically proven to reverse signs of aging, but only if used on a CONSISTENT BASIS!   *Remember that retinoids should not be used if you are pregnant.  *Discontinue use 1 week prior to waxing, as skin is more likely to tear.

## 2024-09-09 ENCOUNTER — POSTPARTUM VISIT (OUTPATIENT)
Dept: OBSTETRICS AND GYNECOLOGY | Facility: CLINIC | Age: 24
End: 2024-09-09
Payer: COMMERCIAL

## 2024-09-09 ENCOUNTER — PATIENT MESSAGE (OUTPATIENT)
Dept: OBSTETRICS AND GYNECOLOGY | Facility: CLINIC | Age: 24
End: 2024-09-09

## 2024-09-09 VITALS
WEIGHT: 187.81 LBS | DIASTOLIC BLOOD PRESSURE: 80 MMHG | HEIGHT: 64 IN | BODY MASS INDEX: 32.06 KG/M2 | SYSTOLIC BLOOD PRESSURE: 130 MMHG

## 2024-09-09 DIAGNOSIS — Z30.9 ENCOUNTER FOR CONTRACEPTIVE MANAGEMENT, UNSPECIFIED TYPE: ICD-10-CM

## 2024-09-09 PROCEDURE — 0503F POSTPARTUM CARE VISIT: CPT | Mod: CPTII,S$GLB,, | Performed by: NURSE PRACTITIONER

## 2024-09-09 PROCEDURE — 99999 PR PBB SHADOW E&M-EST. PATIENT-LVL III: CPT | Mod: PBBFAC,,, | Performed by: NURSE PRACTITIONER

## 2024-09-09 RX ORDER — NORETHINDRONE 0.35 MG/1
1 TABLET ORAL DAILY
Qty: 90 TABLET | Refills: 3 | Status: SHIPPED | OUTPATIENT
Start: 2024-09-09 | End: 2025-09-09

## 2024-09-09 NOTE — PROGRESS NOTES
Postpartum Visit  Almas Colon is a 24 y.o. female  is here for a postpartum visit. She is 6 weeks postpartum following a spontaneous vaginal delivery, of a female  infant weighinlb 5oz, with Anesthesia: epidural. . The delivery was at 38w 3d.     Pregnancy was complicated by: GHTN, Rh negative, asthma and anxiety.      OB History    Para Term  AB Living   1 1 1     1   SAB IAB Ectopic Multiple Live Births         0 1      # Outcome Date GA Lbr Hugo/2nd Weight Sex Type Anes PTL Lv   1 Term 24 38w3d / 06:58 3.77 kg (8 lb 5 oz) F Vag-Spont EPI N JODI       Postpartum course has been uncomplicated.  Bleeding no bleeding. Bowel/ bladder function is normal. Her last pap was 3/23 WNL.  Patient is not sexually active. Desired contraception method is oral progesterone-only contraceptive.   Postpartum depression screening: negative. Reports stable on Zoloft and mood has leveled off as time has gone on  Baby's course has been uncomplicated. Baby is feeding by breast.     ROS:  GENERAL: No fever, chills, fatigability.  VULVAR: No pain, no lesions and no itching.  VAGINAL: No relaxation, no itching, no discharge, no abnormal bleeding and no lesions.  ABDOMEN: No abdominal pain. Denies nausea. Denies vomiting. No diarrhea. No constipation  BREAST: Denies pain. No lumps. No discharge.  URINARY: No incontinence, no nocturia, no frequency and no dysuria.  CARDIOVASCULAR: No chest pain. No shortness of breath. No leg cramps.  NEUROLOGICAL: No headaches. No vision changes.      General appearance - alert, well appearing, and in no distress  Mental status - alert, oriented to person, place, and time  Skin - coloration normal for race, good turgor, warm to touch, no rashes  Abdomen - soft, nontender, nondistended, no masses or organomegaly  Pelvic - External genitalia postpartum: normal, well-healed, without lesions or masses.  Normal female hair distribution. Adequate perineal body. Urethral  meatus without lesions or prolapse. Urethra: no masses, tenderness, or scarring.  Bladder: without tenderness or masses.  Vaginal mucosa moist and pink, normal rugae, without lesions, abnormal discharge, or foul odor.  Cervix pink, no lesions, no cervical motion tenderness.  Uterus: midline, non tender, smooth, not enlarged, not prolapsed  No adnexal masses or tenderness.  Extremities - no edema, redness or tenderness in the calves or thighs      Almas was seen today for postpartum care.    Diagnoses and all orders for this visit:    Routine postpartum follow-up    Encounter for contraceptive management, unspecified type  -     norethindrone (MICRONOR) 0.35 mg tablet; Take 1 tablet (0.35 mg total) by mouth once daily.        Discussed contraception - pt desires progesterone only pills   Counseling regarding resuming normal activities of exercise and work.  Postpartum precautions reviewed    Routine follow up in 1 yr     JOSH Peralta

## 2024-09-17 ENCOUNTER — PATIENT MESSAGE (OUTPATIENT)
Dept: PSYCHIATRY | Facility: CLINIC | Age: 24
End: 2024-09-17
Payer: COMMERCIAL

## 2024-09-24 ENCOUNTER — OFFICE VISIT (OUTPATIENT)
Dept: PSYCHIATRY | Facility: CLINIC | Age: 24
End: 2024-09-24
Payer: COMMERCIAL

## 2024-09-24 DIAGNOSIS — F90.2 ATTENTION DEFICIT HYPERACTIVITY DISORDER (ADHD), COMBINED TYPE: ICD-10-CM

## 2024-09-24 DIAGNOSIS — F41.1 GAD (GENERALIZED ANXIETY DISORDER): Primary | ICD-10-CM

## 2024-09-24 RX ORDER — SERTRALINE HYDROCHLORIDE 25 MG/1
25 TABLET, FILM COATED ORAL DAILY
Qty: 30 TABLET | Refills: 2 | Status: SHIPPED | OUTPATIENT
Start: 2024-09-24 | End: 2024-12-23

## 2024-09-24 NOTE — PROGRESS NOTES
The patient location is: at home in [place], [place]  The chief complaint leading to consultation is: dep/anxiety    Visit type: audiovisual    Face to Face time with patient: 15  30 minutes of total time spent on the encounter, which includes face to face time and non-face to face time preparing to see the patient (eg, review of tests), Obtaining and/or reviewing separately obtained history, Documenting clinical information in the electronic or other health record, Independently interpreting results (not separately reported) and communicating results to the patient/family/caregiver, or Care coordination (not separately reported).     Each patient to whom he or she provides medical services by telemedicine is:  (1) informed of the relationship between the physician and patient and the respective role of any other health care provider with respect to management of the patient; and (2) notified that he or she may decline to receive medical services by telemedicine and may withdraw from such care at any time.    Outpatient Psychiatry Follow-Up Visit (MD/NP)    9/24/2024    Clinical Status of Patient:  Outpatient (Ambulatory)    Chief Complaint:  Almas Colon is a 24 y.o. female who presents today for follow-up of depression, anxiety and attention problems.  Met with patient.     Interval History and Content of Current Session:  Interim Events/Subjective Report/Content of Current Session:  Almsa is seen virtually today for medication follow-up.  Unfortunately, her grandma has recently passed away.  We speak about grief in detail.  Feels that she is doing better as her daughter is now sleeping through the night.  She is interested in resuming treatment for ADHD but would also like to breastfeed for the next two years.  Discussed that we will look into this more as far as safety regarding stimulant while breastfeeding.  She would like to continue low-dose sertraline for mood/anxiety.  She denies suicidal or  homicidal ideation.  No other complaints today.    FROM PREVIOUS HPI  Almas is seen virtually today for medication follow-up.  She gave birth to her daughter, Romina, at the end of July.  We speak to this in detail.  She states she is adjusting to motherhood well.  Has had a positive mindset slept despite lack of sleep.  She feels very supported from her significant other/father of her daughter.  She states she has not had any issues tending to her needs or her daughter's needs.  She states she feels a bit more emotional but not extreme.  It has not impacted bonding with her daughter.  She would like to continue with sertraline 25 mg in will consider increase in the future.  Denies suicidal or homicidal ideation.  Other complaints today.    Outpatient Psychiatry Initial Visit (MD/NP)     1/28/2021     Almas Colon, a 20 y.o. female, presenting for initial evaluation visit. Met with patient.     Reason for Encounter: Referral from Dina Thayer NP. Patient complains of inattention/concentration.     History of Present Illness:   This is a healthy 20-year-old female, past medical history of well-controlled asthma, who presents for initial evaluation today.  She reports that she has been prescribed Vyvanse 20 mg daily for attention/concentration.  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  Has been on winter break so has not been taking the Vyvanse.  She does report that it has helped her mood, attention, focus, complete schoolwork as well as daily tasks.  She is considering antidepressant therapy but is not sure if that is necessary at this point.  She does start school next week and will see how she is doing on Vyvanse 40 mg daily and go from there.  She is unsure if she wants to stay on stimulant long-term.  Discussed Strattera for nonstimulant treatment of ADHD.  She will consider this medication as well.  Denies suicidal or homicidal ideation.  Appetite and sleep are stable.   Has supportive family and friends.     She states her mood has been stable.  She is entering her senior year of college.  Was going to graduate May 2021 but is pushing that back to December 2021.  She is looking for engineering internships.  Program is challenging but she enjoys it.  Also established with Jennifer Jackson LCSW for psychotherapy.  Financial situation is a bit stressed.  Living with her mother.  Has been attempting to increase physical activity which does cause her to feel better.  Patient describes that she was on sertraline years ago and did well with that but it did cause her to have some questionable decision making.     Depression symptoms: patient reports little interest or pleasure in doing things; feeling down, depressed, or hopeless; trouble falling asleep or staying asleep, or sleeping too much; feeling tired or having little energy; poor appetite/overeating; trouble concentrating. Denies thoughts of self-harm or suicide.     Anxiety symptoms: reports feeling nervous, anxious, or on edge; not being able to stop or control worrying; worrying too much about different things; becoming easily annoyed or irritable.     Natalie/Hypomania symptoms: denies periods of time in which she has gone without sleep with elevated or irritable mood and impulsive decision making.  Mood disorder questionnaire, she does admit to irritability, racing thoughts, distractibility at times more social or outgoing.     Psychosis: denies     Attention/Concentration:  Improved with Vyvanse     Body Image/Hx of eating disorders: denies     Suicidal ideation and risk: denies, no history of suicide attempts     Homicidal/Violient ideation and risk: denies     Sleep: not sleeping that well, trying to get back into sleep schedule, used to take melatonin      Appetite:  Adequate     GAD7:  Five, somewhat difficult  PHQ9:  13, extremely difficult  MDQ:  Negative screen     Past Psychiatric History:  Prior diagnoses: ADHD,  depressive symptoms in the past     Inpatient psychiatric treatment:  No history of inpatient or intensive outpatient     Outpatient psychiatric treatment:  Primary care provider     Prior medications: zoloft, wellbutrin, vyvanse     Current medications: vyvanse     Prior suicide attempts:  None     Prior history self harm:  No history of self-injurious behavior     Prior psychotherapy:  Currently involved with Jennifer Jackson LCSW        Allergies:       Review of patient's allergies indicates:   Allergen Reactions    Ceftriaxone Hives    Cefzil [cefprozil] Hives         Past Medical History:       Past Medical History:   Diagnosis Date    Acne      Asthma      Depression      Kidney stone      Seasonal allergic rhinitis 5/30/2018   G0     History TBI: denies  History seizures: denies     Past Surgical History:        Past Surgical History:   Procedure Laterality Date    TONSILLECTOMY             Family History:   Suicide: denies  Substance use: denies  Bipolar disorder/Psychotic disorder: denies  Anxiety: mother  Depression: mother     Social History:  Childhood: born in [place], raised by biological mother and father. Living with mom now. Father is staying with her grandmother. Establishing a relationship with her father. Two half sisters and step-sister. Doing well. Good relationship.  Marital status: supportive boyfriend  Children: none  Resides: [place]  Occupation: not working right now, going to try find part time job  Hobbies: trying to find a hobby  Confucianism: none  Education level: senior in college  : denies  Legal: denies  History of abuse/trauma: a bit of emotional trauma, working through in therapy     Substance History:  Tobacco: denies  Alcohol: occasionally, rarely  Drug use: denies  Caffeine: cup a day of coffee      Rehab:  Prior/current AA: denies    Review of Systems   PSYCHIATRIC: Pertinant items are noted in the narrative.  RESPIRATORY: No shortness of breath.  CARDIOVASCULAR: No  tachycardia or chest pain.  GASTROINTESTINAL: No nausea, vomiting, pain, constipation or diarrhea.    Past Medical, Family and Social History: The patient's past medical, family and social history have been reviewed and updated as appropriate within the electronic medical record - see encounter notes.    Compliance: yes    Side effects: none    Risk Parameters:  Patient reports no suicidal ideation  Patient reports no homicidal ideation  Patient reports no self-injurious behavior  Patient reports no violent behavior    Exam (detailed: at least 9 elements; comprehensive: all 15 elements)   Constitutional  Vitals:  Most recent vital signs, dated less than 90 days prior to this appointment, were reviewed.   There were no vitals filed for this visit.       General:  unremarkable, age appropriate, casually dressed     Musculoskeletal  Muscle Strength/Tone:  no dyskinesia   Gait & Station:  Virtual visit     Psychiatric  Speech:  no latency; no press   Mood & Affect:  good  congruent and appropriate   Thought Process:  normal and logical   Associations:  intact   Thought Content:  normal, no suicidality, no homicidality, delusions, or paranoia   Insight:  intact   Judgement: behavior is adequate to circumstances   Orientation:  grossly intact   Memory: intact for content of interview   Language: grossly intact   Attention Span & Concentration:  able to focus   Fund of Knowledge:  intact and appropriate to age and level of education     Assessment and Diagnosis   Status/Progress: Based on the examination today, the patient's problem(s) is/are improved.  New problems have not been presented today.   Co-morbidities, Diagnostic uncertainty and Lack of compliance are not complicating management of the primary condition.     General Impression:   Adjustment disorder  ADHD, combined presentation type    Intervention/Counseling/Treatment Plan   Medication Management: Continue current medications. The risks and benefits of  medication were discussed with the patient.  Counseling provided with patient as follows: importance of compliance with chosen treatment options was emphasized, risks and benefits of treatment options, including medications, were discussed with the patient, risk factor reduction, prognosis    Almas is seen today for medication follow up.  Based on assessment:    Continue sertraline 25 mg once daily for mood/anxiety. Will have very close f/u with this patient.   Consider resuming ADHD medication but will need to discuss med r/se/b    Please go to emergency department if feeling as though you are a harm to yourself or others or if you are in crisis.     Please call the clinic to report any worsening of symptoms or problems associated with medication.    This medication has US boxed warning that states there may be an increased risk of suicidal thoughts and behavior in pediatric and young adult patients in short-term studies. Closely monitor all antidepressant-treated patients for clinical worsening, and for emergence of suicidal thoughts and behaviors.    Discussed with patient informed consent, risks vs. benefits, alternative treatments, side effect profile and the inherent unpredictability of individual responses to these treatments. The patient expresses understanding of the above and displays the capacity to agree with this current plan and had no other questions.      Return to Clinic: as scheduled, as needed

## 2024-10-04 ENCOUNTER — PATIENT MESSAGE (OUTPATIENT)
Dept: PSYCHIATRY | Facility: CLINIC | Age: 24
End: 2024-10-04
Payer: COMMERCIAL

## 2024-10-09 ENCOUNTER — OFFICE VISIT (OUTPATIENT)
Dept: OPTOMETRY | Facility: CLINIC | Age: 24
End: 2024-10-09
Payer: COMMERCIAL

## 2024-10-09 DIAGNOSIS — H52.13 MYOPIA OF BOTH EYES: Primary | ICD-10-CM

## 2024-10-09 PROCEDURE — 92012 INTRM OPH EXAM EST PATIENT: CPT | Mod: ,,, | Performed by: OPTOMETRIST

## 2024-10-09 PROCEDURE — 92310 CONTACT LENS FITTING OU: CPT | Mod: CSM,,, | Performed by: OPTOMETRIST

## 2024-10-09 PROCEDURE — 99999 PR PBB SHADOW E&M-EST. PATIENT-LVL III: CPT | Mod: PBBFAC,,, | Performed by: OPTOMETRIST

## 2024-10-09 NOTE — PROGRESS NOTES
HPI     Eye Problem     Additional comments: Blur ou at dist, x mos, no assoc pain or red, no   relief over time, constant           Comments    Eye [id number]    Recent pregnancy and feels va has changed. Blurred dva. Got new contacts   from last rx. Denies any flashes or floaters.           Last edited by Samm Vance, OD on 10/9/2024 11:09 AM.            Assessment /Plan     For exam results, see Encounter Report.    Myopia of both eyes      Contact lens Rx released to pt. Daily wear only advised, with education to risks of extended wear.  Discussed lens care, compliance and solutions. RTC yearly contact lens follow up.

## 2024-10-10 ENCOUNTER — OFFICE VISIT (OUTPATIENT)
Dept: PSYCHIATRY | Facility: CLINIC | Age: 24
End: 2024-10-10
Payer: COMMERCIAL

## 2024-10-10 ENCOUNTER — LAB VISIT (OUTPATIENT)
Dept: LAB | Facility: HOSPITAL | Age: 24
End: 2024-10-10
Attending: PHYSICIAN ASSISTANT
Payer: COMMERCIAL

## 2024-10-10 VITALS
HEART RATE: 119 BPM | SYSTOLIC BLOOD PRESSURE: 141 MMHG | HEIGHT: 64 IN | DIASTOLIC BLOOD PRESSURE: 95 MMHG | BODY MASS INDEX: 31.86 KG/M2 | WEIGHT: 186.63 LBS

## 2024-10-10 DIAGNOSIS — F90.2 ATTENTION DEFICIT HYPERACTIVITY DISORDER (ADHD), COMBINED TYPE: Primary | ICD-10-CM

## 2024-10-10 DIAGNOSIS — F90.2 ATTENTION DEFICIT HYPERACTIVITY DISORDER (ADHD), COMBINED TYPE: ICD-10-CM

## 2024-10-10 DIAGNOSIS — F41.1 GAD (GENERALIZED ANXIETY DISORDER): ICD-10-CM

## 2024-10-10 DIAGNOSIS — F43.23 ADJUSTMENT DISORDER WITH MIXED ANXIETY AND DEPRESSED MOOD: ICD-10-CM

## 2024-10-10 LAB
AMPHET+METHAMPHET UR QL: NEGATIVE
BARBITURATES UR QL SCN>200 NG/ML: NEGATIVE
BENZODIAZ UR QL SCN>200 NG/ML: NEGATIVE
BZE UR QL SCN: NEGATIVE
CANNABINOIDS UR QL SCN: NEGATIVE
CREAT UR-MCNC: 80 MG/DL (ref 15–325)
OPIATES UR QL SCN: NEGATIVE
PCP UR QL SCN>25 NG/ML: NEGATIVE
TOXICOLOGY INFORMATION: NORMAL

## 2024-10-10 PROCEDURE — G2211 COMPLEX E/M VISIT ADD ON: HCPCS | Mod: S$GLB,,, | Performed by: PHYSICIAN ASSISTANT

## 2024-10-10 PROCEDURE — 1160F RVW MEDS BY RX/DR IN RCRD: CPT | Mod: CPTII,S$GLB,, | Performed by: PHYSICIAN ASSISTANT

## 2024-10-10 PROCEDURE — 3077F SYST BP >= 140 MM HG: CPT | Mod: CPTII,S$GLB,, | Performed by: PHYSICIAN ASSISTANT

## 2024-10-10 PROCEDURE — 1159F MED LIST DOCD IN RCRD: CPT | Mod: CPTII,S$GLB,, | Performed by: PHYSICIAN ASSISTANT

## 2024-10-10 PROCEDURE — 3080F DIAST BP >= 90 MM HG: CPT | Mod: CPTII,S$GLB,, | Performed by: PHYSICIAN ASSISTANT

## 2024-10-10 PROCEDURE — 80307 DRUG TEST PRSMV CHEM ANLYZR: CPT | Performed by: PHYSICIAN ASSISTANT

## 2024-10-10 PROCEDURE — 3008F BODY MASS INDEX DOCD: CPT | Mod: CPTII,S$GLB,, | Performed by: PHYSICIAN ASSISTANT

## 2024-10-10 PROCEDURE — 99999 PR PBB SHADOW E&M-EST. PATIENT-LVL IV: CPT | Mod: PBBFAC,,, | Performed by: PHYSICIAN ASSISTANT

## 2024-10-10 PROCEDURE — 99214 OFFICE O/P EST MOD 30 MIN: CPT | Mod: S$GLB,,, | Performed by: PHYSICIAN ASSISTANT

## 2024-10-10 NOTE — PATIENT INSTRUCTIONS
Monitor blood pressure and pulse and send readings to the clinic.    Complete urine drug screen.    Will consider immediate release formulation for stimulant.     Please go to emergency department if feeling as though you are a harm to yourself or others or if you are in crisis. Please call the clinic to report any worsening of symptoms or problems associated with medication.

## 2024-10-10 NOTE — PROGRESS NOTES
Outpatient Psychiatry Follow-Up Visit (MD/NP)    10/10/2024    Clinical Status of Patient:  Outpatient (Ambulatory)    Chief Complaint:  Almas Colon is a 24 y.o. female who presents today for follow-up of depression, anxiety and attention problems.  Met with patient.     Interval History and Content of Current Session:  Interim Events/Subjective Report/Content of Current Session:  Almas is seen today for in office medication follow-up.  States she is a bit tired today, her baby did not sleep much last night but she plans to rest later today.  We discussed resuming a stimulant as she feels that functionality is significantly impaired without the medication.  Education was provided regarding stimulants and breastfeeding.  Best research shows and immediate release methylphenidate formulation is preferred and to be taken as needed.  She coordinated with her child's pediatrician who was okay with Almas resuming stimulant.  She is due for urine drug screen.  Her blood pressure was elevated today in clinic and she will need to monitor blood pressure to ensure it is within normal limits before resuming stimulant.  She denies suicidal or homicidal ideation.  No other complaints today.    FROM PREVIOUS HPI  Almas is seen virtually today for medication follow-up.  Unfortunately, her grandma has recently passed away.  We speak about grief in detail.  Feels that she is doing better as her daughter is now sleeping through the night.  She is interested in resuming treatment for ADHD but would also like to breastfeed for the next two years.  Discussed that we will look into this more as far as safety regarding stimulant while breastfeeding.  She would like to continue low-dose sertraline for mood/anxiety.  She denies suicidal or homicidal ideation.  No other complaints today.      Outpatient Psychiatry Initial Visit (MD/NP)     1/28/2021     Almas Colon, a 20 y.o. female, presenting for initial evaluation  visit. Met with patient.     Reason for Encounter: Referral from Dina Thayer NP. Patient complains of inattention/concentration.     History of Present Illness:   This is a healthy 20-year-old female, past medical history of well-controlled asthma, who presents for initial evaluation today.  She reports that she has been prescribed Vyvanse 20 mg daily for attention/concentration.  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  Has been on winter break so has not been taking the Vyvanse.  She does report that it has helped her mood, attention, focus, complete schoolwork as well as daily tasks.  She is considering antidepressant therapy but is not sure if that is necessary at this point.  She does start school next week and will see how she is doing on Vyvanse 40 mg daily and go from there.  She is unsure if she wants to stay on stimulant long-term.  Discussed Strattera for nonstimulant treatment of ADHD.  She will consider this medication as well.  Denies suicidal or homicidal ideation.  Appetite and sleep are stable.  Has supportive family and friends.     She states her mood has been stable.  She is entering her senior year of college.  Was going to graduate May 2021 but is pushing that back to December 2021.  She is looking for engineering internships.  Program is challenging but she enjoys it.  Also established with Jennifer Jackson LCSW for psychotherapy.  Financial situation is a bit stressed.  Living with her mother.  Has been attempting to increase physical activity which does cause her to feel better.  Patient describes that she was on sertraline years ago and did well with that but it did cause her to have some questionable decision making.     Depression symptoms: patient reports little interest or pleasure in doing things; feeling down, depressed, or hopeless; trouble falling asleep or staying asleep, or sleeping too much; feeling tired or having little energy; poor  appetite/overeating; trouble concentrating. Denies thoughts of self-harm or suicide.     Anxiety symptoms: reports feeling nervous, anxious, or on edge; not being able to stop or control worrying; worrying too much about different things; becoming easily annoyed or irritable.     Natalie/Hypomania symptoms: denies periods of time in which she has gone without sleep with elevated or irritable mood and impulsive decision making.  Mood disorder questionnaire, she does admit to irritability, racing thoughts, distractibility at times more social or outgoing.     Psychosis: denies     Attention/Concentration:  Improved with Vyvanse     Body Image/Hx of eating disorders: denies     Suicidal ideation and risk: denies, no history of suicide attempts     Homicidal/Violient ideation and risk: denies     Sleep: not sleeping that well, trying to get back into sleep schedule, used to take melatonin      Appetite:  Adequate     GAD7:  Five, somewhat difficult  PHQ9:  13, extremely difficult  MDQ:  Negative screen     Past Psychiatric History:  Prior diagnoses: ADHD, depressive symptoms in the past     Inpatient psychiatric treatment:  No history of inpatient or intensive outpatient     Outpatient psychiatric treatment:  Primary care provider     Prior medications: zoloft, wellbutrin, vyvanse     Current medications: vyvanse     Prior suicide attempts:  None     Prior history self harm:  No history of self-injurious behavior     Prior psychotherapy:  Currently involved with Jennifer Jackson LCSW        Allergies:       Review of patient's allergies indicates:   Allergen Reactions    Ceftriaxone Hives    Cefzil [cefprozil] Hives         Past Medical History:       Past Medical History:   Diagnosis Date    Acne      Asthma      Depression      Kidney stone      Seasonal allergic rhinitis 5/30/2018   G0     History TBI: denies  History seizures: denies     Past Surgical History:        Past Surgical History:   Procedure Laterality Date     TONSILLECTOMY             Family History:   Suicide: denies  Substance use: denies  Bipolar disorder/Psychotic disorder: denies  Anxiety: mother  Depression: mother     Social History:  Childhood: born in [place], raised by biological mother and father. Living with mom now. Father is staying with her grandmother. Establishing a relationship with her father. Two half sisters and step-sister. Doing well. Good relationship.  Marital status: supportive boyfriend  Children: none  Resides: [place]  Occupation: not working right now, going to try find part time job  Hobbies: trying to find a hobby  Hoahaoism: none  Education level: senior in college  : denies  Legal: denies  History of abuse/trauma: a bit of emotional trauma, working through in therapy     Substance History:  Tobacco: denies  Alcohol: occasionally, rarely  Drug use: denies  Caffeine: cup a day of coffee      Rehab:  Prior/current AA: denies    Review of Systems   PSYCHIATRIC: Pertinant items are noted in the narrative.  RESPIRATORY: No shortness of breath.  CARDIOVASCULAR: No tachycardia or chest pain.  GASTROINTESTINAL: No nausea, vomiting, pain, constipation or diarrhea.    Past Medical, Family and Social History: The patient's past medical, family and social history have been reviewed and updated as appropriate within the electronic medical record - see encounter notes.    Compliance: yes    Side effects: none    Risk Parameters:  Patient reports no suicidal ideation  Patient reports no homicidal ideation  Patient reports no self-injurious behavior  Patient reports no violent behavior    Exam (detailed: at least 9 elements; comprehensive: all 15 elements)   Constitutional  Vitals:  Most recent vital signs, dated less than 90 days prior to this appointment, were reviewed.   Vitals:    10/10/24 1108   BP: (!) 141/95   Pulse: (!) 119        General:  unremarkable, age appropriate, casually dressed     Musculoskeletal  Muscle Strength/Tone:   no dyskinesia   Gait & Station:  Non ataxic     Psychiatric  Speech:  no latency; no press   Mood & Affect:  Ok, tired  congruent and appropriate   Thought Process:  normal and logical   Associations:  intact   Thought Content:  normal, no suicidality, no homicidality, delusions, or paranoia   Insight:  intact   Judgement: behavior is adequate to circumstances   Orientation:  grossly intact   Memory: intact for content of interview   Language: grossly intact   Attention Span & Concentration:  able to focus   Fund of Knowledge:  intact and appropriate to age and level of education     Assessment and Diagnosis   Status/Progress: Based on the examination today, the patient's problem(s) is/are improved.  New problems have not been presented today.   Co-morbidities, Diagnostic uncertainty and Lack of compliance are not complicating management of the primary condition.     General Impression:   Adjustment disorder  ADHD, combined presentation type    Intervention/Counseling/Treatment Plan   Medication Management: Continue current medications. The risks and benefits of medication were discussed with the patient.  Counseling provided with patient as follows: importance of compliance with chosen treatment options was emphasized, risks and benefits of treatment options, including medications, were discussed with the patient, risk factor reduction, prognosis    Almas is seen today for medication follow up.  Based on assessment:    Continue sertraline 25 mg once daily for mood/anxiety. Will have very close f/u with this patient.   Order urine drug screen.    Monitor blood pressure and pulse over the weekend with consideration of immediate release methylphenidate formulation for inattention/focus as she feels that functioning is significantly impaired without stimulant.    Please go to emergency department if feeling as though you are a harm to yourself or others or if you are in crisis.     Please call the clinic to report  any worsening of symptoms or problems associated with medication.    This medication has US boxed warning that states there may be an increased risk of suicidal thoughts and behavior in pediatric and young adult patients in short-term studies. Closely monitor all antidepressant-treated patients for clinical worsening, and for emergence of suicidal thoughts and behaviors.    Discussed with patient informed consent, risks vs. benefits, alternative treatments, side effect profile and the inherent unpredictability of individual responses to these treatments. The patient expresses understanding of the above and displays the capacity to agree with this current plan and had no other questions.      Return to Clinic: as scheduled, as needed

## 2024-10-12 ENCOUNTER — PATIENT MESSAGE (OUTPATIENT)
Dept: OPTOMETRY | Facility: CLINIC | Age: 24
End: 2024-10-12
Payer: COMMERCIAL

## 2024-10-14 ENCOUNTER — PATIENT MESSAGE (OUTPATIENT)
Dept: PSYCHIATRY | Facility: CLINIC | Age: 24
End: 2024-10-14
Payer: COMMERCIAL

## 2024-10-14 DIAGNOSIS — F90.2 ATTENTION DEFICIT HYPERACTIVITY DISORDER (ADHD), COMBINED TYPE: Primary | ICD-10-CM

## 2024-10-15 NOTE — TELEPHONE ENCOUNTER
Please call pt and encourage her to send blood pressure readings over via unread [employer] message. Thank you in advance.

## 2024-10-17 RX ORDER — METHYLPHENIDATE HYDROCHLORIDE 5 MG/1
5 TABLET ORAL 2 TIMES DAILY PRN
Qty: 20 TABLET | Refills: 0 | Status: SHIPPED | OUTPATIENT
Start: 2024-10-17

## 2024-10-21 ENCOUNTER — OFFICE VISIT (OUTPATIENT)
Dept: PRIMARY CARE CLINIC | Facility: CLINIC | Age: 24
End: 2024-10-21
Payer: COMMERCIAL

## 2024-10-21 VITALS
DIASTOLIC BLOOD PRESSURE: 74 MMHG | HEART RATE: 97 BPM | RESPIRATION RATE: 16 BRPM | WEIGHT: 185.63 LBS | BODY MASS INDEX: 31.69 KG/M2 | SYSTOLIC BLOOD PRESSURE: 118 MMHG | OXYGEN SATURATION: 98 % | HEIGHT: 64 IN

## 2024-10-21 DIAGNOSIS — Z00.00 ENCOUNTER FOR MEDICAL EXAMINATION TO ESTABLISH CARE: Primary | ICD-10-CM

## 2024-10-21 DIAGNOSIS — Z13.6 ENCOUNTER FOR LIPID SCREENING FOR CARDIOVASCULAR DISEASE: ICD-10-CM

## 2024-10-21 DIAGNOSIS — Z13.220 ENCOUNTER FOR LIPID SCREENING FOR CARDIOVASCULAR DISEASE: ICD-10-CM

## 2024-10-21 DIAGNOSIS — E61.1 IRON DEFICIENCY: ICD-10-CM

## 2024-10-21 DIAGNOSIS — J45.20 MILD INTERMITTENT ASTHMA WITHOUT COMPLICATION: ICD-10-CM

## 2024-10-21 DIAGNOSIS — J30.2 SEASONAL ALLERGIC RHINITIS, UNSPECIFIED TRIGGER: ICD-10-CM

## 2024-10-21 PROCEDURE — 99999 PR PBB SHADOW E&M-EST. PATIENT-LVL IV: CPT | Mod: PBBFAC,,, | Performed by: NURSE PRACTITIONER

## 2024-10-28 PROBLEM — O13.3 GESTATIONAL HYPERTENSION, THIRD TRIMESTER: Status: RESOLVED | Noted: 2024-07-27 | Resolved: 2024-10-28

## 2024-10-29 ENCOUNTER — OFFICE VISIT (OUTPATIENT)
Dept: PSYCHIATRY | Facility: CLINIC | Age: 24
End: 2024-10-29
Payer: COMMERCIAL

## 2024-10-29 DIAGNOSIS — F41.1 GAD (GENERALIZED ANXIETY DISORDER): ICD-10-CM

## 2024-10-29 DIAGNOSIS — F43.23 ADJUSTMENT DISORDER WITH MIXED ANXIETY AND DEPRESSED MOOD: ICD-10-CM

## 2024-10-29 DIAGNOSIS — F90.2 ATTENTION DEFICIT HYPERACTIVITY DISORDER (ADHD), COMBINED TYPE: Primary | ICD-10-CM

## 2024-10-29 PROCEDURE — 1159F MED LIST DOCD IN RCRD: CPT | Mod: CPTII,95,, | Performed by: PHYSICIAN ASSISTANT

## 2024-10-29 PROCEDURE — 1160F RVW MEDS BY RX/DR IN RCRD: CPT | Mod: CPTII,95,, | Performed by: PHYSICIAN ASSISTANT

## 2024-10-29 PROCEDURE — 99213 OFFICE O/P EST LOW 20 MIN: CPT | Mod: 95,,, | Performed by: PHYSICIAN ASSISTANT

## 2024-10-29 PROCEDURE — G2211 COMPLEX E/M VISIT ADD ON: HCPCS | Mod: 95,,, | Performed by: PHYSICIAN ASSISTANT

## 2024-10-29 RX ORDER — LISDEXAMFETAMINE DIMESYLATE 20 MG/1
20 CAPSULE ORAL EVERY MORNING
Qty: 30 CAPSULE | Refills: 0 | Status: SHIPPED | OUTPATIENT
Start: 2024-10-29

## 2024-10-29 NOTE — PROGRESS NOTES
The patient location is: at home in [place], [place]  The chief complaint leading to consultation is: inattention/focus    Visit type: audiovisual    Face to Face time with patient: 6  15 minutes of total time spent on the encounter, which includes face to face time and non-face to face time preparing to see the patient (eg, review of tests), Obtaining and/or reviewing separately obtained history, Documenting clinical information in the electronic or other health record, Independently interpreting results (not separately reported) and communicating results to the patient/family/caregiver, or Care coordination (not separately reported).     Each patient to whom he or she provides medical services by telemedicine is:  (1) informed of the relationship between the physician and patient and the respective role of any other health care provider with respect to management of the patient; and (2) notified that he or she may decline to receive medical services by telemedicine and may withdraw from such care at any time.    Outpatient Psychiatry Follow-Up Visit (MD/NP)    10/29/2024    Clinical Status of Patient:  Outpatient (Ambulatory)    Chief Complaint:  Almas Chowdhury Isaiah is a 24 y.o. female who presents today for follow-up of depression, anxiety and attention problems.  Met with patient.     Interval History and Content of Current Session:  Interim Events/Subjective Report/Content of Current Session:  Almas is seen today for medication follow-up.  States that she did not tolerate methylphenidate, caused irritability/agitation as well as depression.  Currently utilizing progesterone for birth control.  She is interested in having Vyvanse available as needed for inattention/focus.  Will be returning to work soon and nervous about her functionality.  She states that she is not able to do what she has historically would be able to do with Vyvanse.  She is understanding of medication risks, side effects, benefits to  herself and her daughter.  Numerous education material was provided to client during last visit and this visit.  Denies suicidal or homicidal ideation.  No other complaints today.    FROM PREVIOUS HPI  Almas is seen today for in office medication follow-up.  States she is a bit tired today, her baby did not sleep much last night but she plans to rest later today.  We discussed resuming a stimulant as she feels that functionality is significantly impaired without the medication.  Education was provided regarding stimulants and breastfeeding.  Best research shows and immediate release methylphenidate formulation is preferred and to be taken as needed.  She coordinated with her child's pediatrician who was okay with Almas resuming stimulant.  She is due for urine drug screen.  Her blood pressure was elevated today in clinic and she will need to monitor blood pressure to ensure it is within normal limits before resuming stimulant.  She denies suicidal or homicidal ideation.  No other complaints today.    FROM PREVIOUS HPI  Almas is seen virtually today for medication follow-up.  Unfortunately, her grandma has recently passed away.  We speak about grief in detail.  Feels that she is doing better as her daughter is now sleeping through the night.  She is interested in resuming treatment for ADHD but would also like to breastfeed for the next two years.  Discussed that we will look into this more as far as safety regarding stimulant while breastfeeding.  She would like to continue low-dose sertraline for mood/anxiety.  She denies suicidal or homicidal ideation.  No other complaints today.      Outpatient Psychiatry Initial Visit (MD/NP)     1/28/2021     Almas Colon, a 20 y.o. female, presenting for initial evaluation visit. Met with patient.     Reason for Encounter: Referral from Dina Thayer NP. Patient complains of inattention/concentration.     History of Present Illness:   This is a healthy  20-year-old female, past medical history of well-controlled asthma, who presents for initial evaluation today.  She reports that she has been prescribed Vyvanse 20 mg daily for attention/concentration.  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  Has been on winter break so has not been taking the Vyvanse.  She does report that it has helped her mood, attention, focus, complete schoolwork as well as daily tasks.  She is considering antidepressant therapy but is not sure if that is necessary at this point.  She does start school next week and will see how she is doing on Vyvanse 40 mg daily and go from there.  She is unsure if she wants to stay on stimulant long-term.  Discussed Strattera for nonstimulant treatment of ADHD.  She will consider this medication as well.  Denies suicidal or homicidal ideation.  Appetite and sleep are stable.  Has supportive family and friends.     She states her mood has been stable.  She is entering her senior year of college.  Was going to graduate May 2021 but is pushing that back to December 2021.  She is looking for engineering internships.  Program is challenging but she enjoys it.  Also established with Jennifer [facility] for psychotherapy.  Financial situation is a bit stressed.  Living with her mother.  Has been attempting to increase physical activity which does cause her to feel better.  Patient describes that she was on sertraline years ago and did well with that but it did cause her to have some questionable decision making.     Depression symptoms: patient reports little interest or pleasure in doing things; feeling down, depressed, or hopeless; trouble falling asleep or staying asleep, or sleeping too much; feeling tired or having little energy; poor appetite/overeating; trouble concentrating. Denies thoughts of self-harm or suicide.     Anxiety symptoms: reports feeling nervous, anxious, or on edge; not being able to stop or control worrying;  worrying too much about different things; becoming easily annoyed or irritable.     Natalie/Hypomania symptoms: denies periods of time in which she has gone without sleep with elevated or irritable mood and impulsive decision making.  Mood disorder questionnaire, she does admit to irritability, racing thoughts, distractibility at times more social or outgoing.     Psychosis: denies     Attention/Concentration:  Improved with Vyvanse     Body Image/Hx of eating disorders: denies     Suicidal ideation and risk: denies, no history of suicide attempts     Homicidal/Violient ideation and risk: denies     Sleep: not sleeping that well, trying to get back into sleep schedule, used to take melatonin      Appetite:  Adequate     GAD7:  Five, somewhat difficult  PHQ9:  13, extremely difficult  MDQ:  Negative screen     Past Psychiatric History:  Prior diagnoses: ADHD, depressive symptoms in the past     Inpatient psychiatric treatment:  No history of inpatient or intensive outpatient     Outpatient psychiatric treatment:  Primary care provider     Prior medications: zoloft, wellbutrin, vyvanse     Current medications: vyvanse     Prior suicide attempts:  None     Prior history self harm:  No history of self-injurious behavior     Prior psychotherapy:  Currently involved with Jennifer Jackson LCSW        Allergies:       Review of patient's allergies indicates:   Allergen Reactions    Ceftriaxone Hives    Cefzil [cefprozil] Hives         Past Medical History:       Past Medical History:   Diagnosis Date    Acne      Asthma      Depression      Kidney stone      Seasonal allergic rhinitis 5/30/2018   G0     History TBI: denies  History seizures: denies     Past Surgical History:        Past Surgical History:   Procedure Laterality Date    TONSILLECTOMY             Family History:   Suicide: denies  Substance use: denies  Bipolar disorder/Psychotic disorder: denies  Anxiety: mother  Depression: mother     Social  History:  Childhood: born in [place], raised by biological mother and father. Living with mom now. Father is staying with her grandmother. Establishing a relationship with her father. Two half sisters and step-sister. Doing well. Good relationship.  Marital status: supportive boyfriend  Children: none  Resides: [place]  Occupation: not working right now, going to try find part time job  Hobbies: trying to find a hobby  Restoration: none  Education level: senior in college  : denies  Legal: denies  History of abuse/trauma: a bit of emotional trauma, working through in therapy     Substance History:  Tobacco: denies  Alcohol: occasionally, rarely  Drug use: denies  Caffeine: cup a day of coffee      Rehab:  Prior/current AA: denies    Review of Systems   PSYCHIATRIC: Pertinant items are noted in the narrative.  RESPIRATORY: No shortness of breath.  CARDIOVASCULAR: No tachycardia or chest pain.  GASTROINTESTINAL: No nausea, vomiting, pain, constipation or diarrhea.    Past Medical, Family and Social History: The patient's past medical, family and social history have been reviewed and updated as appropriate within the electronic medical record - see encounter notes.    Compliance: yes    Side effects: none    Risk Parameters:  Patient reports no suicidal ideation  Patient reports no homicidal ideation  Patient reports no self-injurious behavior  Patient reports no violent behavior    Exam (detailed: at least 9 elements; comprehensive: all 15 elements)   Constitutional  Vitals:  Most recent vital signs, dated less than 90 days prior to this appointment, were reviewed.   There were no vitals filed for this visit.       General:  unremarkable, age appropriate, casually dressed     Musculoskeletal  Muscle Strength/Tone:  no dyskinesia   Gait & Station:  Non ataxic     Psychiatric  Speech:  no latency; no press   Mood & Affect:  Ok  congruent and appropriate   Thought Process:  normal and logical   Associations:   intact   Thought Content:  normal, no suicidality, no homicidality, delusions, or paranoia   Insight:  intact   Judgement: behavior is adequate to circumstances   Orientation:  grossly intact   Memory: intact for content of interview   Language: grossly intact   Attention Span & Concentration:  able to focus   Fund of Knowledge:  intact and appropriate to age and level of education     Assessment and Diagnosis   Status/Progress: Based on the examination today, the patient's problem(s) is/are improved.  New problems have not been presented today.   Co-morbidities, Diagnostic uncertainty and Lack of compliance are not complicating management of the primary condition.     General Impression:   Adjustment disorder  ADHD, combined presentation type    Intervention/Counseling/Treatment Plan   Medication Management: Continue current medications. The risks and benefits of medication were discussed with the patient.  Counseling provided with patient as follows: importance of compliance with chosen treatment options was emphasized, risks and benefits of treatment options, including medications, were discussed with the patient, risk factor reduction, prognosis    Almas is seen today for medication follow up.  Based on assessment:    Continue sertraline 25 mg once daily for mood/anxiety. Will have very close f/u with this patient.   Resume low-dose Vyvanse 20 mg as needed for inattention/focus.  Discuss breastfeeding recommendations with stimulants.  She is understanding.  She has coordinated with her pediatrician regarding resuming stimulant.    Please go to emergency department if feeling as though you are a harm to yourself or others or if you are in crisis.     Please call the clinic to report any worsening of symptoms or problems associated with medication.    This medication has US boxed warning that states there may be an increased risk of suicidal thoughts and behavior in pediatric and young adult patients in  short-term studies. Closely monitor all antidepressant-treated patients for clinical worsening, and for emergence of suicidal thoughts and behaviors.    Discussed with patient informed consent, risks vs. benefits, alternative treatments, side effect profile and the inherent unpredictability of individual responses to these treatments. The patient expresses understanding of the above and displays the capacity to agree with this current plan and had no other questions.      Return to Clinic: as scheduled, as needed

## 2024-11-05 ENCOUNTER — OFFICE VISIT (OUTPATIENT)
Dept: OBSTETRICS AND GYNECOLOGY | Facility: CLINIC | Age: 24
End: 2024-11-05
Payer: COMMERCIAL

## 2024-11-05 VITALS
BODY MASS INDEX: 31.27 KG/M2 | WEIGHT: 183.19 LBS | HEIGHT: 64 IN | DIASTOLIC BLOOD PRESSURE: 86 MMHG | SYSTOLIC BLOOD PRESSURE: 122 MMHG

## 2024-11-05 DIAGNOSIS — R10.2 PELVIC PRESSURE IN FEMALE: Primary | ICD-10-CM

## 2024-11-05 LAB
B-HCG UR QL: NEGATIVE
CTP QC/QA: YES

## 2024-11-05 PROCEDURE — 99999 PR PBB SHADOW E&M-EST. PATIENT-LVL III: CPT | Mod: PBBFAC,,, | Performed by: NURSE PRACTITIONER

## 2024-11-05 PROCEDURE — 87088 URINE BACTERIA CULTURE: CPT | Performed by: NURSE PRACTITIONER

## 2024-11-05 PROCEDURE — 87147 CULTURE TYPE IMMUNOLOGIC: CPT | Performed by: NURSE PRACTITIONER

## 2024-11-05 PROCEDURE — 1159F MED LIST DOCD IN RCRD: CPT | Mod: CPTII,S$GLB,, | Performed by: NURSE PRACTITIONER

## 2024-11-05 PROCEDURE — 87086 URINE CULTURE/COLONY COUNT: CPT | Performed by: NURSE PRACTITIONER

## 2024-11-05 PROCEDURE — 3079F DIAST BP 80-89 MM HG: CPT | Mod: CPTII,S$GLB,, | Performed by: NURSE PRACTITIONER

## 2024-11-05 PROCEDURE — 99213 OFFICE O/P EST LOW 20 MIN: CPT | Mod: S$GLB,,, | Performed by: NURSE PRACTITIONER

## 2024-11-05 PROCEDURE — 81025 URINE PREGNANCY TEST: CPT | Mod: S$GLB,,, | Performed by: NURSE PRACTITIONER

## 2024-11-05 PROCEDURE — 3074F SYST BP LT 130 MM HG: CPT | Mod: CPTII,S$GLB,, | Performed by: NURSE PRACTITIONER

## 2024-11-05 PROCEDURE — 3008F BODY MASS INDEX DOCD: CPT | Mod: CPTII,S$GLB,, | Performed by: NURSE PRACTITIONER

## 2024-11-05 NOTE — PROGRESS NOTES
CC: lower abdominal pressure     HPI: Pt is a 24 y.o.  female who presents c/o lower abdominal pressure for the past few days. She denies any dysuria, new back pain or fever. She reports some possible depression on minipill. She restarted Vyvanse and is only putting baby to breast at night. Baby was 8#6 at delivery and she pushed for a total of 3 hrs. She reports increased dryness with intercourse- she is using lubricant.     ROS:  GENERAL: Feeling well overall. Denies fever or chills.   SKIN: Denies rash or lesions.   HEAD: Denies head injury or headache.   NODES: Denies enlarged lymph nodes.   CHEST: Denies chest pain or shortness of breath.   CARDIOVASCULAR: Denies palpitations or left sided chest pain.   ABDOMEN: No abdominal pain, constipation, diarrhea, nausea, vomiting or rectal bleeding.   URINARY: No dysuria, hematuria, or burning on urination.  REPRODUCTIVE: See HPI.   BREASTS: Denies pain, lumps, or nipple discharge.   HEMATOLOGIC: No easy bruisability or excessive bleeding.   MUSCULOSKELETAL: Denies joint pain or swelling.   NEUROLOGIC: Denies syncope or weakness.   PSYCHIATRIC: Denies depression, anxiety or mood swings.    PE:   APPEARANCE: Well nourished, well developed, White female in no acute distress.  VULVA: No lesions. Normal external female genitalia.  URETHRAL MEATUS: Normal size and location, no lesions, no prolapse.  URETHRA: No masses, tenderness, or prolapse.  VAGINA: Moist. No lesions or lacerations noted. No abnormal discharge present. No odor present.   CERVIX: No lesions or discharge. No cervical motion tenderness.   UTERUS: Normal size, regular shape, mobile, non-tender.  ADNEXA: No tenderness. No fullness or masses palpated in the adnexal regions.   ANUS PERINEUM: Normal.      Diagnosis:  1. Pelvic pressure in female        Plan:   UPT is neg  Urine cx  If symptoms persist can refer to pelvic floor PT and obtain pelvic US for eval   Discussed monitoring for depression symptoms - can  consider increasing SSRI during luteal phase and / or transitioning to from mini pill to OCPs   Encouraged regular daily exercise to help with mood/ release endorphins     Orders Placed This Encounter    Urine culture    POCT Urine Pregnancy         Follow-up PRN no resolution of symptoms.    Suzy Sinha, FNP-C

## 2024-11-08 ENCOUNTER — PATIENT MESSAGE (OUTPATIENT)
Dept: OBSTETRICS AND GYNECOLOGY | Facility: CLINIC | Age: 24
End: 2024-11-08
Payer: COMMERCIAL

## 2024-11-08 LAB — BACTERIA UR CULT: ABNORMAL

## 2024-11-08 RX ORDER — CLINDAMYCIN HYDROCHLORIDE 300 MG/1
300 CAPSULE ORAL EVERY 12 HOURS
Qty: 14 CAPSULE | Refills: 0 | Status: SHIPPED | OUTPATIENT
Start: 2024-11-08 | End: 2024-11-15

## 2024-11-30 ENCOUNTER — PATIENT MESSAGE (OUTPATIENT)
Dept: OPTOMETRY | Facility: CLINIC | Age: 24
End: 2024-11-30
Payer: COMMERCIAL

## 2024-12-04 ENCOUNTER — PATIENT MESSAGE (OUTPATIENT)
Dept: PSYCHIATRY | Facility: CLINIC | Age: 24
End: 2024-12-04
Payer: COMMERCIAL

## 2024-12-04 DIAGNOSIS — F90.2 ATTENTION DEFICIT HYPERACTIVITY DISORDER (ADHD), COMBINED TYPE: ICD-10-CM

## 2024-12-04 RX ORDER — LISDEXAMFETAMINE DIMESYLATE 20 MG/1
20 CAPSULE ORAL EVERY MORNING
Qty: 30 CAPSULE | Refills: 0 | Status: SHIPPED | OUTPATIENT
Start: 2024-12-04

## 2024-12-05 ENCOUNTER — PATIENT MESSAGE (OUTPATIENT)
Dept: PRIMARY CARE CLINIC | Facility: CLINIC | Age: 24
End: 2024-12-05
Payer: COMMERCIAL

## 2024-12-05 DIAGNOSIS — R07.9 CHEST PAIN, UNSPECIFIED TYPE: Primary | ICD-10-CM

## 2024-12-05 NOTE — TELEPHONE ENCOUNTER
Called to triage pt. States she started with right shoulder pain when she got home from work that radiated to right arm and wrist. Pt states she also started with pain to left arm and wrist. Denies chest, back, and jaw pain. Denies dyspnea. Pt states pain improved with Ibuprofen. Denies family hx of cardiology hx. Denies working out recently. Pt concerned about her heart and would like to see a cardiologist. Pended referral but unsure what to put for reason.

## 2024-12-16 ENCOUNTER — PATIENT MESSAGE (OUTPATIENT)
Dept: PRIMARY CARE CLINIC | Facility: CLINIC | Age: 24
End: 2024-12-16
Payer: COMMERCIAL

## 2024-12-19 ENCOUNTER — OFFICE VISIT (OUTPATIENT)
Dept: PRIMARY CARE CLINIC | Facility: CLINIC | Age: 24
End: 2024-12-19
Payer: COMMERCIAL

## 2024-12-19 VITALS
SYSTOLIC BLOOD PRESSURE: 124 MMHG | HEIGHT: 64 IN | DIASTOLIC BLOOD PRESSURE: 80 MMHG | RESPIRATION RATE: 16 BRPM | HEART RATE: 97 BPM | OXYGEN SATURATION: 98 % | WEIGHT: 183.88 LBS | BODY MASS INDEX: 31.39 KG/M2

## 2024-12-19 DIAGNOSIS — M79.603 PAIN OF UPPER EXTREMITY, UNSPECIFIED LATERALITY: ICD-10-CM

## 2024-12-19 DIAGNOSIS — R07.9 CHEST PAIN, UNSPECIFIED TYPE: Primary | ICD-10-CM

## 2024-12-19 LAB
OHS QRS DURATION: 90 MS
OHS QTC CALCULATION: 399 MS

## 2024-12-19 PROCEDURE — 3008F BODY MASS INDEX DOCD: CPT | Mod: CPTII,S$GLB,, | Performed by: NURSE PRACTITIONER

## 2024-12-19 PROCEDURE — 1160F RVW MEDS BY RX/DR IN RCRD: CPT | Mod: CPTII,S$GLB,, | Performed by: NURSE PRACTITIONER

## 2024-12-19 PROCEDURE — 1159F MED LIST DOCD IN RCRD: CPT | Mod: CPTII,S$GLB,, | Performed by: NURSE PRACTITIONER

## 2024-12-19 PROCEDURE — 3074F SYST BP LT 130 MM HG: CPT | Mod: CPTII,S$GLB,, | Performed by: NURSE PRACTITIONER

## 2024-12-19 PROCEDURE — 99214 OFFICE O/P EST MOD 30 MIN: CPT | Mod: S$GLB,,, | Performed by: NURSE PRACTITIONER

## 2024-12-19 PROCEDURE — 99999 PR PBB SHADOW E&M-EST. PATIENT-LVL III: CPT | Mod: PBBFAC,,, | Performed by: NURSE PRACTITIONER

## 2024-12-19 PROCEDURE — 3079F DIAST BP 80-89 MM HG: CPT | Mod: CPTII,S$GLB,, | Performed by: NURSE PRACTITIONER

## 2024-12-19 PROCEDURE — 93010 ELECTROCARDIOGRAM REPORT: CPT | Mod: S$GLB,,, | Performed by: INTERNAL MEDICINE

## 2024-12-19 PROCEDURE — 93005 ELECTROCARDIOGRAM TRACING: CPT | Mod: S$GLB,,, | Performed by: NURSE PRACTITIONER

## 2024-12-19 NOTE — PROGRESS NOTES
[facility] Note    Chief Complaint      Chief Complaint   Patient presents with    Shoulder Pain    Arm Pain     History of Present Illness      Almas Colon is a 24 y.o. female patient who presents today for pain down ashok arms x 1 incident.      EKG- unremarkable    History of Present Illness    CHIEF COMPLAINT:  Almas presents today for arm pain.    MUSCULOSKELETAL PAIN:  She experienced acute pain that began in the right shoulder and spread to both arms, described as excruciating at 2 a.m. She took ibuprofen which provided relief, allowing her to sleep. There has been no recurrence of the arm pain since then. She denies any associated heart or chest pain. She also reports back pain since receiving an epidural, which is exacerbated by prolonged activities. The pain initially began in the shoulder area on the day of epidural placement. She describes the sensation as more consistent with nerve pain rather than muscular discomfort. Her symptoms are aggravated by carrying her five-month-old daughter and performing household tasks.    CURRENT MEDICATIONS:  She continues Vyvanse.    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    DIAGNOSTIC HISTORY:  She had an EKG performed during a hospital stay in June or earlier this year.      ROS:  Musculoskeletal: -joint pain, +muscle pain         Health Maintenance   Topic Date Due    COVID-19 Vaccine (1 - 2024-25 season) Never done    Pap Smear  03/10/2026    TETANUS VACCINE  07/23/2034    RSV Vaccine (Age 60+ and Pregnant patients) (1 - 1-dose 75+ series) 03/09/2073    Hepatitis C Screening  Completed    HIV Screening  Completed    Lipid Panel  Completed    HPV Vaccines  Completed    Influenza Vaccine  Addressed    Pneumococcal Vaccines (Age 0-49)  Aged Out       Past Medical History:   Diagnosis Date    Acne     Anemia 7/28/2024    Anxiety     Asthma      "Depression     Seasonal allergic rhinitis 05/30/2018       Past Surgical History:   Procedure Laterality Date    TONSILLECTOMY         family history includes Acne in her father and mother; Diabetes in her maternal grandmother; Glaucoma in her paternal grandmother.    Social History     Tobacco Use    Smoking status: Never    Smokeless tobacco: Never   Substance Use Topics    Alcohol use: Not Currently     Comment: Occasionally. Maybe once a month or every few months    Drug use: No       Outpatient Encounter Medications as of 12/19/2024   Medication Sig Dispense Refill    b complex vitamins capsule Take 1 capsule by mouth once daily.      ferrous sulfate 325 (65 FE) MG EC tablet Take 1 tablet (325 mg total) by mouth 2 (two) times daily. 60 tablet 1    lisdexamfetamine (VYVANSE) 20 MG capsule Take 1 capsule (20 mg total) by mouth every morning. 30 capsule 0    prenatal vit no.124/iron/folic (PRENATAL VITAMIN ORAL) Take by mouth.      sertraline (ZOLOFT) 25 MG tablet Take 1 tablet (25 mg total) by mouth once daily. 30 tablet 2    budesonide-formoterol 160-4.5 mcg (SYMBICORT) 160-4.5 mcg/actuation HFAA Inhale 2 puffs into the lungs every 12 (twelve) hours. Controller 10.2 g 2    norethindrone (MICRONOR) 0.35 mg tablet Take 1 tablet (0.35 mg total) by mouth once daily. (Patient not taking: Reported on 12/19/2024) 90 tablet 3     No facility-administered encounter medications on file as of 12/19/2024.        Review of patient's allergies indicates:   Allergen Reactions    Cefzil [cefprozil] Hives    Rocephin [ceftriaxone] Hives       Physical Exam      Vital Signs  Pulse: 97  Resp: 16  SpO2: 98 %  BP: 124/80  BP Location: Right arm  Patient Position: Sitting  Height and Weight  Height: 5' 4" (162.6 cm)  Weight: 83.4 kg (183 lb 13.8 oz)  BSA (Calculated - sq m): 1.94 sq meters  BMI (Calculated): 31.5  Weight in (lb) to have BMI = 25: 145.3    Physical Exam  Vitals and nursing note reviewed.   Constitutional:       " General: She is not in acute distress.     Appearance: Normal appearance. She is well-developed. She is not ill-appearing.   HENT:      Head: Normocephalic and atraumatic.      Right Ear: External ear normal.      Left Ear: External ear normal.   Eyes:      Conjunctiva/sclera: Conjunctivae normal.      Pupils: Pupils are equal, round, and reactive to light.   Neck:      Thyroid: No thyromegaly.      Vascular: No JVD.      Trachea: No tracheal deviation.   Cardiovascular:      Rate and Rhythm: Normal rate and regular rhythm.      Heart sounds: Normal heart sounds. No murmur heard.  Pulmonary:      Effort: Pulmonary effort is normal.      Breath sounds: Normal breath sounds.   Chest:      Chest wall: No tenderness.   Abdominal:      Palpations: Abdomen is soft.      Tenderness: There is no abdominal tenderness. There is no guarding.   Musculoskeletal:         General: Normal range of motion.      Cervical back: Normal range of motion and neck supple.   Lymphadenopathy:      Cervical: No cervical adenopathy.   Skin:     General: Skin is warm and dry.   Neurological:      General: No focal deficit present.      Mental Status: She is alert and oriented to person, place, and time.   Psychiatric:         Mood and Affect: Mood normal.         Behavior: Behavior normal.         Thought Content: Thought content normal.         Judgment: Judgment normal.          Laboratory:  CBC:  Lab Results   Component Value Date    WBC 7.99 08/08/2024    RBC 3.44 (L) 08/08/2024    HGB 10.6 (L) 08/08/2024    HCT 32.6 (L) 08/08/2024     08/08/2024    MCV 95 08/08/2024    MCH 30.8 08/08/2024    MCHC 32.5 08/08/2024    MCHC 35.3 07/29/2024    MCHC 36.9 (H) 07/27/2024     CMP:  Lab Results   Component Value Date     07/29/2024    CALCIUM 8.3 (L) 07/29/2024    ALBUMIN 2.1 (L) 07/29/2024    PROT 4.7 (L) 07/29/2024     07/29/2024    K 3.8 07/29/2024    CO2 19 (L) 07/29/2024     (H) 07/29/2024    BUN 8 07/29/2024     ALKPHOS 101 07/29/2024    ALT 12 07/29/2024    AST 27 07/29/2024    BILITOT 0.3 07/29/2024    BILITOT 0.5 07/27/2024    BILITOT 0.5 07/19/2024     URINALYSIS:  Lab Results   Component Value Date    COLORU Colorless (A) 07/08/2024    CLARITYU Clear 04/12/2024    SPECGRAV 1.005 07/08/2024    PHUR 7.0 07/08/2024    PROTEINUA Negative 07/08/2024    BACTERIA Rare 07/08/2024    NITRITE Negative 07/08/2024    LEUKOCYTESUR 2+ (A) 07/08/2024    UROBILINOGEN Negative 07/08/2024      LIPIDS:  Lab Results   Component Value Date    TSH 1.015 01/19/2024    TSH 0.943 08/24/2021    HDL 51 08/24/2021    CHOL 145 08/24/2021    TRIG 49 08/24/2021    LDLCALC 84.2 08/24/2021    CHOLHDL 35.2 08/24/2021    NONHDLCHOL 94 08/24/2021    TOTALCHOLEST 2.8 08/24/2021     TSH:  Lab Results   Component Value Date    TSH 1.015 01/19/2024    TSH 0.943 08/24/2021     A1C:  Lab Results   Component Value Date    HGBA1C 4.3 08/24/2021         Assessment/Plan     Almas Colon is a 24 y.o.female with:    Assessment & Plan    IMPRESSION:  - Considered potential causes of patient's arm pain, including heart-related issues and pinched nerves from neck or upper back  - Will perform EKG to rule out cardiac involvement, despite absence of chest pain or tightness  - Noted patient's recent childbirth and ongoing physical adjustments, including potential nerve pain in back  - Assessed current medications, including Vyvanse for daytime use while breastfeeding  - Evaluated vital signs, noting heart rate of 97 and blood pressure of 124/80    ARM PAIN:  - Discussed potential causes of arm pain, including cardiac issues and pinched nerves.  - EKG performed to rule out cardiac involvement in arm pain.    POSTPARTUM CARE:  - Explained that body changes after childbirth can take up to 1 year to normalize.    ATTENTION-DEFICIT HYPERACTIVITY DISORDER:  - Continued Vyvanse for daytime use while pumping breast milk.         Chest pain, unspecified type  -     IN  OFFICE EKG 12-LEAD (to [facility])    Pain of upper extremity, unspecified laterality  -     IN OFFICE EKG 12-LEAD (to [place])          Health Maintenance Due   Topic Date Due    COVID-19 Vaccine (1 - 2024-25 season) Never done        I spent 34 minutes on the day of this encounter for preparing for, evaluating, treating, and managing this patient.      -Continue current medications and maintain follow up with specialists.  Return to clinic  as needed for any concerns. No follow-ups on file.     This note was generated with the assistance of ambient listening technology. Verbal consent was obtained by the patient and accompanying visitor(s) for the recording of patient appointment to facilitate this note. I attest to having reviewed and edited the generated note for accuracy, though some syntax or spelling errors may persist. Please contact the author of this note for any clarification.        BREE De Oliveira  [facility] [place]

## 2025-01-02 ENCOUNTER — PATIENT MESSAGE (OUTPATIENT)
Dept: PSYCHIATRY | Facility: CLINIC | Age: 25
End: 2025-01-02
Payer: COMMERCIAL

## 2025-01-07 ENCOUNTER — OFFICE VISIT (OUTPATIENT)
Dept: PSYCHIATRY | Facility: CLINIC | Age: 25
End: 2025-01-07
Payer: COMMERCIAL

## 2025-01-07 DIAGNOSIS — F41.1 GAD (GENERALIZED ANXIETY DISORDER): ICD-10-CM

## 2025-01-07 DIAGNOSIS — F90.2 ATTENTION DEFICIT HYPERACTIVITY DISORDER (ADHD), COMBINED TYPE: Primary | ICD-10-CM

## 2025-01-07 RX ORDER — LISDEXAMFETAMINE DIMESYLATE 20 MG/1
20 CAPSULE ORAL EVERY MORNING
Qty: 30 CAPSULE | Refills: 0 | Status: SHIPPED | OUTPATIENT
Start: 2025-01-07

## 2025-01-07 NOTE — PROGRESS NOTES
The patient location is: at home in [place], [place]  The chief complaint leading to consultation is: inattention/focus    Visit type: audiovisual    Face to Face time with patient: 11  20 minutes of total time spent on the encounter, which includes face to face time and non-face to face time preparing to see the patient (eg, review of tests), Obtaining and/or reviewing separately obtained history, Documenting clinical information in the electronic or other health record, Independently interpreting results (not separately reported) and communicating results to the patient/family/caregiver, or Care coordination (not separately reported).     Each patient to whom he or she provides medical services by telemedicine is:  (1) informed of the relationship between the physician and patient and the respective role of any other health care provider with respect to management of the patient; and (2) notified that he or she may decline to receive medical services by telemedicine and may withdraw from such care at any time.    Outpatient Psychiatry Follow-Up Visit (MD/NP)    1/7/2025    Clinical Status of Patient:  Outpatient (Ambulatory)    Chief Complaint:  Almas Chowdhury Isaiah is a 24 y.o. female who presents today for follow-up of depression, anxiety and attention problems.  Met with patient.     Interval History and Content of Current Session:  Interim Events/Subjective Report/Content of Current Session:  Almas is seen today for medication follow up. She is seen virtually. Doing okay at this time. Provides medical and life update. Tolerating Vyvanse well and would like to continue as prescribed. Denies SI/HI.    FROM PREVIOUS HPI  Almas is seen today for medication follow-up.  States that she did not tolerate methylphenidate, caused irritability/agitation as well as depression.  Currently utilizing progesterone for birth control.  She is interested in having Vyvanse available as needed for inattention/focus.  Will be  returning to work soon and nervous about her functionality.  She states that she is not able to do what she has historically would be able to do with Vyvanse.  She is understanding of medication risks, side effects, benefits to herself and her daughter.  Numerous education material was provided to client during last visit and this visit.  Denies suicidal or homicidal ideation.  No other complaints today.    Outpatient Psychiatry Initial Visit (MD/NP)     1/28/2021     Almas Colon, a 20 y.o. female, presenting for initial evaluation visit. Met with patient.     Reason for Encounter: Referral from Dina Thayer NP. Patient complains of inattention/concentration.     History of Present Illness:   This is a healthy 20-year-old female, past medical history of well-controlled asthma, who presents for initial evaluation today.  She reports that she has been prescribed Vyvanse 20 mg daily for attention/concentration.  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  Has been on winter break so has not been taking the Vyvanse.  She does report that it has helped her mood, attention, focus, complete schoolwork as well as daily tasks.  She is considering antidepressant therapy but is not sure if that is necessary at this point.  She does start school next week and will see how she is doing on Vyvanse 40 mg daily and go from there.  She is unsure if she wants to stay on stimulant long-term.  Discussed Strattera for nonstimulant treatment of ADHD.  She will consider this medication as well.  Denies suicidal or homicidal ideation.  Appetite and sleep are stable.  Has supportive family and friends.     She states her mood has been stable.  She is entering her senior year of college.  Was going to graduate May 2021 but is pushing that back to December 2021.  She is looking for engineering internships.  Program is challenging but she enjoys it.  Also established with Jennifer Jackson LCSW for psychotherapy.   Financial situation is a bit stressed.  Living with her mother.  Has been attempting to increase physical activity which does cause her to feel better.  Patient describes that she was on sertraline years ago and did well with that but it did cause her to have some questionable decision making.     Depression symptoms: patient reports little interest or pleasure in doing things; feeling down, depressed, or hopeless; trouble falling asleep or staying asleep, or sleeping too much; feeling tired or having little energy; poor appetite/overeating; trouble concentrating. Denies thoughts of self-harm or suicide.     Anxiety symptoms: reports feeling nervous, anxious, or on edge; not being able to stop or control worrying; worrying too much about different things; becoming easily annoyed or irritable.     Natalie/Hypomania symptoms: denies periods of time in which she has gone without sleep with elevated or irritable mood and impulsive decision making.  Mood disorder questionnaire, she does admit to irritability, racing thoughts, distractibility at times more social or outgoing.     Psychosis: denies     Attention/Concentration:  Improved with Vyvanse     Body Image/Hx of eating disorders: denies     Suicidal ideation and risk: denies, no history of suicide attempts     Homicidal/Violient ideation and risk: denies     Sleep: not sleeping that well, trying to get back into sleep schedule, used to take melatonin      Appetite:  Adequate     GAD7:  Five, somewhat difficult  PHQ9:  13, extremely difficult  MDQ:  Negative screen     Past Psychiatric History:  Prior diagnoses: ADHD, depressive symptoms in the past     Inpatient psychiatric treatment:  No history of inpatient or intensive outpatient     Outpatient psychiatric treatment:  Primary care provider     Prior medications: zoloft, wellbutrin, vyvanse     Current medications: vyvanse     Prior suicide attempts:  None     Prior history self harm:  No history of self-injurious  behavior     Prior psychotherapy:  Currently involved with Jennifer Jackson LCSW        Allergies:       Review of patient's allergies indicates:   Allergen Reactions    Ceftriaxone Hives    Cefzil [cefprozil] Hives         Past Medical History:       Past Medical History:   Diagnosis Date    Acne      Asthma      Depression      Kidney stone      Seasonal allergic rhinitis 5/30/2018   G0     History TBI: denies  History seizures: denies     Past Surgical History:        Past Surgical History:   Procedure Laterality Date    TONSILLECTOMY             Family History:   Suicide: denies  Substance use: denies  Bipolar disorder/Psychotic disorder: denies  Anxiety: mother  Depression: mother     Social History:  Childhood: born in [place], raised by biological mother and father. Living with mom now. Father is staying with her grandmother. Establishing a relationship with her father. Two half sisters and step-sister. Doing well. Good relationship.  Marital status: supportive boyfriend  Children: none  Resides: [place]  Occupation: not working right now, going to try find part time job  Hobbies: trying to find a hobby  Christianity: none  Education level: senior in college  : denies  Legal: denies  History of abuse/trauma: a bit of emotional trauma, working through in therapy     Substance History:  Tobacco: denies  Alcohol: occasionally, rarely  Drug use: denies  Caffeine: cup a day of coffee      Rehab:  Prior/current AA: denies    Review of Systems   PSYCHIATRIC: Pertinant items are noted in the narrative.  RESPIRATORY: No shortness of breath.  CARDIOVASCULAR: No tachycardia or chest pain.  GASTROINTESTINAL: No nausea, vomiting, pain, constipation or diarrhea.    Past Medical, Family and Social History: The patient's past medical, family and social history have been reviewed and updated as appropriate within the electronic medical record - see encounter notes.    Compliance: yes    Side effects: none    Risk  Parameters:  Patient reports no suicidal ideation  Patient reports no homicidal ideation  Patient reports no self-injurious behavior  Patient reports no violent behavior    Exam (detailed: at least 9 elements; comprehensive: all 15 elements)   Constitutional  Vitals:  Most recent vital signs, dated less than 90 days prior to this appointment, were reviewed.   There were no vitals filed for this visit.       General:  unremarkable, age appropriate, casually dressed     Musculoskeletal  Muscle Strength/Tone:  no dyskinesia   Gait & Station:  Non ataxic     Psychiatric  Speech:  no latency; no press   Mood & Affect:  appropriate   Thought Process:  normal and logical   Associations:  intact   Thought Content:  normal, no suicidality, no homicidality, delusions, or paranoia   Insight:  intact   Judgement: behavior is adequate to circumstances   Orientation:  grossly intact   Memory: intact for content of interview   Language: grossly intact   Attention Span & Concentration:  able to focus   Fund of Knowledge:  intact and appropriate to age and level of education     Assessment and Diagnosis   Status/Progress: Based on the examination today, the patient's problem(s) is/are improved.  New problems have not been presented today.   Co-morbidities, Diagnostic uncertainty and Lack of compliance are not complicating management of the primary condition.     General Impression:   Adjustment disorder  ADHD, combined presentation type    Intervention/Counseling/Treatment Plan   Medication Management: Continue current medications. The risks and benefits of medication were discussed with the patient.  Counseling provided with patient as follows: importance of compliance with chosen treatment options was emphasized, risks and benefits of treatment options, including medications, were discussed with the patient, risk factor reduction, prognosis    Almas is seen today for medication follow up.  Based on assessment:    Continue  sertraline 25 mg once daily for mood/anxiety. Will have very close f/u with this patient.   Continue low-dose Vyvanse 20 mg as needed for inattention/focus.  Discuss breastfeeding recommendations with stimulants.  She is understanding.  She has coordinated with her pediatrician regarding resuming stimulant.    Please go to emergency department if feeling as though you are a harm to yourself or others or if you are in crisis.     Please call the clinic to report any worsening of symptoms or problems associated with medication.    This medication has US boxed warning that states there may be an increased risk of suicidal thoughts and behavior in pediatric and young adult patients in short-term studies. Closely monitor all antidepressant-treated patients for clinical worsening, and for emergence of suicidal thoughts and behaviors.    Discussed with patient informed consent, risks vs. benefits, alternative treatments, side effect profile and the inherent unpredictability of individual responses to these treatments. The patient expresses understanding of the above and displays the capacity to agree with this current plan and had no other questions.      Return to Clinic: as scheduled, as needed

## 2025-02-07 DIAGNOSIS — F41.1 GAD (GENERALIZED ANXIETY DISORDER): ICD-10-CM

## 2025-02-07 RX ORDER — SERTRALINE HYDROCHLORIDE 25 MG/1
25 TABLET, FILM COATED ORAL
Qty: 30 TABLET | Refills: 1 | Status: SHIPPED | OUTPATIENT
Start: 2025-02-07

## 2025-02-11 DIAGNOSIS — F90.2 ATTENTION DEFICIT HYPERACTIVITY DISORDER (ADHD), COMBINED TYPE: ICD-10-CM

## 2025-02-11 RX ORDER — LISDEXAMFETAMINE DIMESYLATE 20 MG/1
20 CAPSULE ORAL EVERY MORNING
Qty: 30 CAPSULE | Refills: 0 | Status: SHIPPED | OUTPATIENT
Start: 2025-02-11

## 2025-02-17 ENCOUNTER — PATIENT MESSAGE (OUTPATIENT)
Dept: PRIMARY CARE CLINIC | Facility: CLINIC | Age: 25
End: 2025-02-17
Payer: COMMERCIAL

## 2025-03-07 ENCOUNTER — OFFICE VISIT (OUTPATIENT)
Dept: OPTOMETRY | Facility: CLINIC | Age: 25
End: 2025-03-07
Payer: COMMERCIAL

## 2025-03-07 ENCOUNTER — TELEPHONE (OUTPATIENT)
Dept: OPHTHALMOLOGY | Facility: CLINIC | Age: 25
End: 2025-03-07
Payer: COMMERCIAL

## 2025-03-07 ENCOUNTER — PATIENT MESSAGE (OUTPATIENT)
Dept: OPTOMETRY | Facility: CLINIC | Age: 25
End: 2025-03-07

## 2025-03-07 DIAGNOSIS — S05.01XA CORNEA ABRASION, RIGHT, INITIAL ENCOUNTER: ICD-10-CM

## 2025-03-07 DIAGNOSIS — S05.01XA CORNEA ABRASION, RIGHT, INITIAL ENCOUNTER: Primary | ICD-10-CM

## 2025-03-07 PROCEDURE — 99999 PR PBB SHADOW E&M-EST. PATIENT-LVL III: CPT | Mod: PBBFAC,,, | Performed by: OPTOMETRIST

## 2025-03-07 RX ORDER — TOBRAMYCIN 3 MG/ML
1 SOLUTION/ DROPS OPHTHALMIC 4 TIMES DAILY
Qty: 5 ML | Refills: 0 | Status: SHIPPED | OUTPATIENT
Start: 2025-03-07 | End: 2025-03-07 | Stop reason: SDUPTHER

## 2025-03-07 RX ORDER — TOBRAMYCIN 3 MG/ML
1 SOLUTION/ DROPS OPHTHALMIC 4 TIMES DAILY
Qty: 5 ML | Refills: 0 | Status: SHIPPED | OUTPATIENT
Start: 2025-03-07 | End: 2025-03-14

## 2025-03-07 NOTE — TELEPHONE ENCOUNTER
----- Message from LinPrim sent at 3/7/2025  9:53 AM CST -----  Regarding: Consult/Advisory  Contact: Almas Colon   Consult/Advisory Name Of Caller:Almas Colon   Contact Preference:[phone] (home)   Nature of call:Patient is calling to get seen today she has something stuck in right eye and feels scratchy. Requesting a call back

## 2025-03-07 NOTE — PROGRESS NOTES
HPI    25 Y/o female is here for [facility] with C/o pt says she was poked in the OD   with finger pt eye is red painful and tearing along with light   sensitivity. Pt says she wears contact lens but did not have them in at   the time of the incident.  Pt denies pain and discomfort   No f/f    Eye med: no gtt   Last edited by Soledad Aranda MA on 3/7/2025  1:39 PM.            Assessment /Plan     For exam results, see Encounter Report.    Cornea abrasion, right, initial encounter  -     tobramycin sulfate 0.3% (TOBREX) 0.3 % ophthalmic solution; Place 1 drop into the right eye 4 (four) times daily. for 7 days  Dispense: 5 mL; Refill: 0      Fingernail abrasion OD from her child    PLAN:    TOBRAMYCIN QID OD X 7 days  Discussed RCE  Pt will call if sx persist w tx, otherwise rtc as sched w Dr Vance

## 2025-03-10 ENCOUNTER — PATIENT MESSAGE (OUTPATIENT)
Dept: PSYCHIATRY | Facility: CLINIC | Age: 25
End: 2025-03-10
Payer: COMMERCIAL

## 2025-03-10 ENCOUNTER — PATIENT MESSAGE (OUTPATIENT)
Dept: OPTOMETRY | Facility: CLINIC | Age: 25
End: 2025-03-10
Payer: COMMERCIAL

## 2025-03-20 DIAGNOSIS — F90.2 ATTENTION DEFICIT HYPERACTIVITY DISORDER (ADHD), COMBINED TYPE: ICD-10-CM

## 2025-03-20 RX ORDER — LISDEXAMFETAMINE DIMESYLATE 20 MG/1
20 CAPSULE ORAL EVERY MORNING
Qty: 30 CAPSULE | Refills: 0 | Status: SHIPPED | OUTPATIENT
Start: 2025-03-20

## 2025-03-26 ENCOUNTER — E-VISIT (OUTPATIENT)
Dept: OBSTETRICS AND GYNECOLOGY | Facility: CLINIC | Age: 25
End: 2025-03-26
Payer: COMMERCIAL

## 2025-03-26 DIAGNOSIS — N89.8 VAGINAL ODOR: Primary | ICD-10-CM

## 2025-03-26 RX ORDER — METRONIDAZOLE 7.5 MG/G
1 GEL VAGINAL NIGHTLY
Qty: 70 G | Refills: 1 | Status: SHIPPED | OUTPATIENT
Start: 2025-03-26 | End: 2025-03-31

## 2025-03-26 NOTE — PROGRESS NOTES
Patient ID: Almas Colon is a 25 y.o. female.    Chief Complaint: General Illness (Entered automatically based on patient selection in [employer].)    The patient initiated a request through [employer] on 3/26/2025 for evaluation and management with a chief complaint of General Illness (Entered automatically based on patient selection in [employer].)     I evaluated the questionnaire submission on 3/26/25.    [facility]    3/26/2025  2:45 PM CDT - Filed by Patient   What do you need help with? Vaginal Concerns   Do you agree to participate in an E-Visit? Yes   If you have any of the following symptoms,  please do not complete an E-Visit,  schedule an appointment with your provider: I acknowledge   Do you have any of the following pregnancy-related conditions? Breast feeding   What is the main issue you would like addressed today? I had a vaginal odor since i have birth 8 months ago. My partner said it went away but he developed an odor now. His dr prescibed him a topical and oral medication to treat BV but advised that i also get treated. I have odor and itch after sex this week   Which of the following vaginal concerns do you have? Itching   Do you have vaginal discharge? Clear discharge   Do you have pain while passing urine? No   Do you have any of the following symptoms? None of the above    Have you taken antibiotics in the last two weeks? No    Do you use any of the following? No   Which of the following applies to your menstrual period? Had in the last 2 weeks   Which of the following applies to your menstrual cycle? Normal amount of bleeding   Do you have spotting between periods? No   Do you have pain with your period? Yes   What type of pain do you have with your period? Cramping   Have you had similar symptoms in the past? No   Have you had a temperature of 100.4 or higher? No   Provide any additional information you feel is important. Itching didnt start until we had sex a few  days ago. I am currently using cream to treat yeaat infection but its not getting better   Please attach any relevant images or files    Are you able to take your vital signs? Yes   Systolic Blood Pressure:    Diastolic Blood Pressure:    Weight:    Height:    Pulse:    Temperature:    Respiration rate:    Pulse Oxygen:          Encounter Diagnosis   Name Primary?    Vaginal odor Yes        No orders of the defined types were placed in this encounter.     Medications Ordered This Encounter   Medications    metroNIDAZOLE (METROGEL) 0.75 % (37.5mg/5 gram) vaginal gel     Sig: Place 1 applicator vaginally every evening. for 5 days     Dispense:  70 g     Refill:  1        No follow-ups on file.      E-Visit Time Tracking:    Day 1 Time (in minutes): 5    Total Time (in minutes): 5

## 2025-04-08 ENCOUNTER — OFFICE VISIT (OUTPATIENT)
Dept: PSYCHIATRY | Facility: CLINIC | Age: 25
End: 2025-04-08
Payer: COMMERCIAL

## 2025-04-08 DIAGNOSIS — F90.2 ATTENTION DEFICIT HYPERACTIVITY DISORDER (ADHD), COMBINED TYPE: Primary | ICD-10-CM

## 2025-04-08 DIAGNOSIS — F41.1 GAD (GENERALIZED ANXIETY DISORDER): ICD-10-CM

## 2025-04-08 DIAGNOSIS — F43.23 ADJUSTMENT DISORDER WITH MIXED ANXIETY AND DEPRESSED MOOD: ICD-10-CM

## 2025-04-08 PROCEDURE — 1159F MED LIST DOCD IN RCRD: CPT | Mod: CPTII,95,, | Performed by: PHYSICIAN ASSISTANT

## 2025-04-08 PROCEDURE — 98005 SYNCH AUDIO-VIDEO EST LOW 20: CPT | Mod: 95,,, | Performed by: PHYSICIAN ASSISTANT

## 2025-04-08 PROCEDURE — 1160F RVW MEDS BY RX/DR IN RCRD: CPT | Mod: CPTII,95,, | Performed by: PHYSICIAN ASSISTANT

## 2025-04-08 PROCEDURE — G2211 COMPLEX E/M VISIT ADD ON: HCPCS | Mod: 95,,, | Performed by: PHYSICIAN ASSISTANT

## 2025-04-08 RX ORDER — SERTRALINE HYDROCHLORIDE 25 MG/1
25 TABLET, FILM COATED ORAL DAILY
Qty: 90 TABLET | Refills: 1 | Status: SHIPPED | OUTPATIENT
Start: 2025-04-08

## 2025-04-08 RX ORDER — LISDEXAMFETAMINE DIMESYLATE 20 MG/1
20 CAPSULE ORAL EVERY MORNING
Qty: 30 CAPSULE | Refills: 0 | Status: SHIPPED | OUTPATIENT
Start: 2025-04-18

## 2025-04-08 NOTE — PROGRESS NOTES
The patient location is: at home in [place], [place]  The chief complaint leading to consultation is: inattention/focus    Visit type: audiovisual    Face to Face time with patient: 13  20 minutes of total time spent on the encounter, which includes face to face time and non-face to face time preparing to see the patient (eg, review of tests), Obtaining and/or reviewing separately obtained history, Documenting clinical information in the electronic or other health record, Independently interpreting results (not separately reported) and communicating results to the patient/family/caregiver, or Care coordination (not separately reported).     Each patient to whom he or she provides medical services by telemedicine is:  (1) informed of the relationship between the physician and patient and the respective role of any other health care provider with respect to management of the patient; and (2) notified that he or she may decline to receive medical services by telemedicine and may withdraw from such care at any time.    Outpatient Psychiatry Follow-Up Visit (MD/NP)    4/8/2025    Clinical Status of Patient:  Outpatient (Ambulatory)    Chief Complaint:  Almas Chowdhury Isaiah is a 25 y.o. female who presents today for follow-up of depression, anxiety and attention problems.  Met with patient.     Interval History and Content of Current Session:  Interim Events/Subjective Report/Content of Current Session:  Almas is seen today for medication follow-up.  Patient is seen virtually.  Reports she is doing well.  States she has just been working and taking care of her baby.  Starting to feel more like herself again.  She is about nine months postpartum.  Feels that hormones are settling a bit.  States that life has been pretty good.  Feels that medication is supporting her and would like to continue as prescribed.  Denies safety concerns.  No other complaints today.    FROM PREVIOUS HPI  Almas is seen today for medication  follow up. She is seen virtually. Doing okay at this time. Provides medical and life update. Tolerating Vyvanse well and would like to continue as prescribed. Denies SI/HI.        4/8/2025    10:00 AM 1/7/2025    10:54 AM 10/29/2024    10:33 AM   MATHEW-7   1. Feeling nervous, anxious, or on edge? Not at all Not at all Not at all   2. Not being able to stop or control worrying? Not at all Not at all Not at all   3. Worrying too much about different things? Not at all Not at all Not at all   4. Trouble relaxing? Not at all Not at all Not at all   5. Being so restless that it is hard to sit still? Not at all Not at all Not at all   6. Becoming easily annoyed or irritable? Not at all Not at all Several days   7. Feeling afraid as if something awful might happen? Not at all Not at all Not at all   8. If you checked off any problems, how difficult have these problems made it for you to do your work, take care of things at home, or get along with other people? Not difficult at all Not difficult at all Somewhat difficult   MATHEW-7 Score 0  0  1    Number answered (out of first 7) 7  7  7    Interpretation Normal  Normal  Normal        Patient-reported         4/8/2025    10:00 AM   PHQ-9 Depression Patient Health Questionnaire   Patient agreed to terms: Yes   Little interest or pleasure in doing things 0   Feeling down, depressed, or hopeless 0   Trouble falling or staying asleep, or sleeping too much 0   Feeling tired or having little energy 1   Poor appetite or overeating 0   Feeling bad about yourself - or that you are a failure or have let yourself or your family down 0   Trouble concentrating on things, such as reading the newspaper or watching television 0   Moving or speaking so slowly that other people could have noticed. Or the opposite - being so fidgety or restless that you have been moving around a lot more than usual 0   Thoughts that you would be better off dead, or of hurting yourself in some way 0   PHQ-9 Total  Score 1    If you checked off any problems, how difficult have these problems made it for you to do your work, take care of things at home, or get along with other people? Somewhat difficult   Interpretation Minimal or None        Patient-reported     Outpatient Psychiatry Initial Visit (MD/NP)     1/28/2021     Almas Colon, a 20 y.o. female, presenting for initial evaluation visit. Met with patient.     Reason for Encounter: Referral from Dina Thayer NP. Patient complains of inattention/concentration.     History of Present Illness:   This is a healthy 20-year-old female, past medical history of well-controlled asthma, who presents for initial evaluation today.  She reports that she has been prescribed Vyvanse 20 mg daily for attention/concentration.  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  Has been on winter break so has not been taking the Vyvanse.  She does report that it has helped her mood, attention, focus, complete schoolwork as well as daily tasks.  She is considering antidepressant therapy but is not sure if that is necessary at this point.  She does start school next week and will see how she is doing on Vyvanse 40 mg daily and go from there.  She is unsure if she wants to stay on stimulant long-term.  Discussed Strattera for nonstimulant treatment of ADHD.  She will consider this medication as well.  Denies suicidal or homicidal ideation.  Appetite and sleep are stable.  Has supportive family and friends.     She states her mood has been stable.  She is entering her senior year of college.  Was going to graduate May 2021 but is pushing that back to December 2021.  She is looking for engineering internships.  Program is challenging but she enjoys it.  Also established with Jennifer Jackson LCSW for psychotherapy.  Financial situation is a bit stressed.  Living with her mother.  Has been attempting to increase physical activity which does cause her to feel better.   Patient describes that she was on sertraline years ago and did well with that but it did cause her to have some questionable decision making.     Depression symptoms: patient reports little interest or pleasure in doing things; feeling down, depressed, or hopeless; trouble falling asleep or staying asleep, or sleeping too much; feeling tired or having little energy; poor appetite/overeating; trouble concentrating. Denies thoughts of self-harm or suicide.     Anxiety symptoms: reports feeling nervous, anxious, or on edge; not being able to stop or control worrying; worrying too much about different things; becoming easily annoyed or irritable.     Natalie/Hypomania symptoms: denies periods of time in which she has gone without sleep with elevated or irritable mood and impulsive decision making.  Mood disorder questionnaire, she does admit to irritability, racing thoughts, distractibility at times more social or outgoing.     Psychosis: denies     Attention/Concentration:  Improved with Vyvanse     Body Image/Hx of eating disorders: denies     Suicidal ideation and risk: denies, no history of suicide attempts     Homicidal/Violient ideation and risk: denies     Sleep: not sleeping that well, trying to get back into sleep schedule, used to take melatonin      Appetite:  Adequate     GAD7:  Five, somewhat difficult  PHQ9:  13, extremely difficult  MDQ:  Negative screen     Past Psychiatric History:  Prior diagnoses: ADHD, depressive symptoms in the past     Inpatient psychiatric treatment:  No history of inpatient or intensive outpatient     Outpatient psychiatric treatment:  Primary care provider     Prior medications: zoloft, wellbutrin, vyvanse     Current medications: vyvanse     Prior suicide attempts:  None     Prior history self harm:  No history of self-injurious behavior     Prior psychotherapy:  Currently involved with Jennifer Jackson LCSW        Allergies:       Review of patient's allergies indicates:    Allergen Reactions    Ceftriaxone Hives    Cefzil [cefprozil] Hives         Past Medical History:       Past Medical History:   Diagnosis Date    Acne      Asthma      Depression      Kidney stone      Seasonal allergic rhinitis 5/30/2018   G0     History TBI: denies  History seizures: denies     Past Surgical History:        Past Surgical History:   Procedure Laterality Date    TONSILLECTOMY             Family History:   Suicide: denies  Substance use: denies  Bipolar disorder/Psychotic disorder: denies  Anxiety: mother  Depression: mother     Social History:  Childhood: born in [place], raised by biological mother and father. Living with mom now. Father is staying with her grandmother. Establishing a relationship with her father. Two half sisters and step-sister. Doing well. Good relationship.  Marital status: supportive boyfriend  Children: none  Resides: [place]  Occupation: not working right now, going to try find part time job  Hobbies: trying to find a hobby  Rastafari: none  Education level: senior in college  : denies  Legal: denies  History of abuse/trauma: a bit of emotional trauma, working through in therapy     Substance History:  Tobacco: denies  Alcohol: occasionally, rarely  Drug use: denies  Caffeine: cup a day of coffee      Rehab:  Prior/current AA: denies    Review of Systems   PSYCHIATRIC: Pertinant items are noted in the narrative.  RESPIRATORY: No shortness of breath.  CARDIOVASCULAR: No tachycardia or chest pain.  GASTROINTESTINAL: No nausea, vomiting, pain, constipation or diarrhea.    Past Medical, Family and Social History: The patient's past medical, family and social history have been reviewed and updated as appropriate within the electronic medical record - see encounter notes.    Compliance: yes    Side effects: none    Risk Parameters:  Patient reports no suicidal ideation  Patient reports no homicidal ideation  Patient reports no self-injurious behavior  Patient  reports no violent behavior    Exam (detailed: at least 9 elements; comprehensive: all 15 elements)   Constitutional  Vitals:  Most recent vital signs, dated less than 90 days prior to this appointment, were reviewed.   There were no vitals filed for this visit.       General:  unremarkable, age appropriate, casually dressed     Musculoskeletal  Muscle Strength/Tone:  no dyskinesia   Gait & Station:  Virtual visit     Psychiatric  Speech:  no latency; no press   Mood & Affect:  appropriate   Thought Process:  normal and logical   Associations:  intact   Thought Content:  normal, no suicidality, no homicidality, delusions, or paranoia   Insight:  intact   Judgement: behavior is adequate to circumstances   Orientation:  grossly intact   Memory: intact for content of interview   Language: grossly intact   Attention Span & Concentration:  able to focus   Fund of Knowledge:  intact and appropriate to age and level of education     Assessment and Diagnosis   Status/Progress: Based on the examination today, the patient's problem(s) is/are improved.  New problems have not been presented today.   Co-morbidities, Diagnostic uncertainty and Lack of compliance are not complicating management of the primary condition.     General Impression:   Adjustment disorder  ADHD, combined presentation type    Intervention/Counseling/Treatment Plan   Medication Management: Continue current medications. The risks and benefits of medication were discussed with the patient.  Counseling provided with patient as follows: importance of compliance with chosen treatment options was emphasized, risks and benefits of treatment options, including medications, were discussed with the patient, risk factor reduction, prognosis    Almas is seen today for medication follow up.  Based on assessment:    Continue sertraline 25 mg once daily for mood/anxiety. Will have very close f/u with this patient.   Continue low-dose Vyvanse 20 mg as needed for  inattention/focus.  Discuss breastfeeding recommendations with stimulants.  She is understanding.  She has coordinated with her pediatrician regarding resuming stimulant.    Please go to emergency department if feeling as though you are a harm to yourself or others or if you are in crisis.     Please call the clinic to report any worsening of symptoms or problems associated with medication.    This medication has US boxed warning that states there may be an increased risk of suicidal thoughts and behavior in pediatric and young adult patients in short-term studies. Closely monitor all antidepressant-treated patients for clinical worsening, and for emergence of suicidal thoughts and behaviors.    Discussed with patient informed consent, risks vs. benefits, alternative treatments, side effect profile and the inherent unpredictability of individual responses to these treatments. The patient expresses understanding of the above and displays the capacity to agree with this current plan and had no other questions.    Visit today included increased complexity associated with managing the longitudinal care of the patient due to the serious and/or complex managed problem(s) anxiety/adhd.      Return to Clinic: as scheduled, as needed

## 2025-04-24 ENCOUNTER — PATIENT MESSAGE (OUTPATIENT)
Dept: PSYCHIATRY | Facility: CLINIC | Age: 25
End: 2025-04-24
Payer: COMMERCIAL

## 2025-05-27 DIAGNOSIS — F90.2 ATTENTION DEFICIT HYPERACTIVITY DISORDER (ADHD), COMBINED TYPE: ICD-10-CM

## 2025-05-27 RX ORDER — LISDEXAMFETAMINE DIMESYLATE 20 MG/1
20 CAPSULE ORAL EVERY MORNING
Qty: 30 CAPSULE | Refills: 0 | Status: SHIPPED | OUTPATIENT
Start: 2025-05-27

## 2025-06-30 DIAGNOSIS — F90.2 ATTENTION DEFICIT HYPERACTIVITY DISORDER (ADHD), COMBINED TYPE: ICD-10-CM

## 2025-06-30 RX ORDER — LISDEXAMFETAMINE DIMESYLATE 20 MG/1
20 CAPSULE ORAL EVERY MORNING
Qty: 30 CAPSULE | Refills: 0 | Status: SHIPPED | OUTPATIENT
Start: 2025-06-30

## 2025-07-08 ENCOUNTER — OFFICE VISIT (OUTPATIENT)
Dept: PSYCHIATRY | Facility: CLINIC | Age: 25
End: 2025-07-08
Payer: COMMERCIAL

## 2025-07-08 DIAGNOSIS — F41.1 GAD (GENERALIZED ANXIETY DISORDER): ICD-10-CM

## 2025-07-08 DIAGNOSIS — F90.2 ATTENTION DEFICIT HYPERACTIVITY DISORDER (ADHD), COMBINED TYPE: Primary | ICD-10-CM

## 2025-07-08 PROCEDURE — G2211 COMPLEX E/M VISIT ADD ON: HCPCS | Mod: 95,,, | Performed by: PHYSICIAN ASSISTANT

## 2025-07-08 PROCEDURE — 1160F RVW MEDS BY RX/DR IN RCRD: CPT | Mod: CPTII,95,, | Performed by: PHYSICIAN ASSISTANT

## 2025-07-08 PROCEDURE — 1159F MED LIST DOCD IN RCRD: CPT | Mod: CPTII,95,, | Performed by: PHYSICIAN ASSISTANT

## 2025-07-08 PROCEDURE — 98005 SYNCH AUDIO-VIDEO EST LOW 20: CPT | Mod: 95,,, | Performed by: PHYSICIAN ASSISTANT

## 2025-07-08 NOTE — PROGRESS NOTES
The patient location is: at home in [place], [place]  The chief complaint leading to consultation is: inattention/focus    Visit type: audiovisual    Face to Face time with patient: 12  20 minutes of total time spent on the encounter, which includes face to face time and non-face to face time preparing to see the patient (eg, review of tests), Obtaining and/or reviewing separately obtained history, Documenting clinical information in the electronic or other health record, Independently interpreting results (not separately reported) and communicating results to the patient/family/caregiver, or Care coordination (not separately reported).     Each patient to whom he or she provides medical services by telemedicine is:  (1) informed of the relationship between the physician and patient and the respective role of any other health care provider with respect to management of the patient; and (2) notified that he or she may decline to receive medical services by telemedicine and may withdraw from such care at any time.    Outpatient Psychiatry Follow-Up Visit (MD/NP)    7/8/2025    Clinical Status of Patient:  Outpatient (Ambulatory)    Chief Complaint:  Almas Chowdhury Isaiah is a 25 y.o. female who presents today for follow-up of depression, anxiety and attention problems.  Met with patient.     Interval History and Content of Current Session:  Interim Events/Subjective Report/Content of Current Session:  Almas is seen today for medication follow up. Provides update regarding her family. Her daughter is doing well, learning to walk. Has continued with hybrid work and has been enjoying the work schedule. Will be going on a beach trip, looking forward to this. Would like to continue current medications as prescribed. Denies med SE and reports good adherence. Denies SI/HI.     FROM PREVIOUS HPI  Almas is seen today for medication follow-up.  Patient is seen virtually.  Reports she is doing well.  States she has just been  working and taking care of her baby.  Starting to feel more like herself again.  She is about nine months postpartum.  Feels that hormones are settling a bit.  States that life has been pretty good.  Feels that medication is supporting her and would like to continue as prescribed.  Denies safety concerns.  No other complaints today.        7/8/2025     8:24 AM 4/8/2025    10:00 AM 1/7/2025    10:54 AM   MATHEW-7   1. Feeling nervous, anxious, or on edge? Not at all Not at all Not at all   2. Not being able to stop or control worrying? Not at all Not at all Not at all   3. Worrying too much about different things? Not at all Not at all Not at all   4. Trouble relaxing? Not at all Not at all Not at all   5. Being so restless that it is hard to sit still? Not at all Not at all Not at all   6. Becoming easily annoyed or irritable? Not at all Not at all Not at all   7. Feeling afraid as if something awful might happen? Not at all Not at all Not at all   8. If you checked off any problems, how difficult have these problems made it for you to do your work, take care of things at home, or get along with other people? Not difficult at all Not difficult at all Not difficult at all   MATHEW-7 Score 0  0  0    Number answered (out of first 7) 7  7  7    Interpretation Normal  Normal  Normal        Patient-reported         7/8/2025     8:25 AM   PHQ-9 Depression Patient Health Questionnaire   Patient agreed to terms: Yes   Little interest or pleasure in doing things 0   Feeling down, depressed, or hopeless 0   Trouble falling or staying asleep, or sleeping too much 0   Feeling tired or having little energy 1   Poor appetite or overeating 0   Feeling bad about yourself - or that you are a failure or have let yourself or your family down 0   Trouble concentrating on things, such as reading the newspaper or watching television 0   Moving or speaking so slowly that other people could have noticed. Or the opposite - being so fidgety or  restless that you have been moving around a lot more than usual 0   Thoughts that you would be better off dead, or of hurting yourself in some way 0   PHQ-9 Total Score 1    If you checked off any problems, how difficult have these problems made it for you to do your work, take care of things at home, or get along with other people? Somewhat difficult   Interpretation Minimal or None        Patient-reported     Outpatient Psychiatry Initial Visit (MD/NP)     1/28/2021     Almas Colon, a 20 y.o. female, presenting for initial evaluation visit. Met with patient.     Reason for Encounter: Referral from Dina Thayer NP. Patient complains of inattention/concentration.     History of Present Illness:   This is a healthy 20-year-old female, past medical history of well-controlled asthma, who presents for initial evaluation today.  She reports that she has been prescribed Vyvanse 20 mg daily for attention/concentration.  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  Has been on winter break so has not been taking the Vyvanse.  She does report that it has helped her mood, attention, focus, complete schoolwork as well as daily tasks.  She is considering antidepressant therapy but is not sure if that is necessary at this point.  She does start school next week and will see how she is doing on Vyvanse 40 mg daily and go from there.  She is unsure if she wants to stay on stimulant long-term.  Discussed Strattera for nonstimulant treatment of ADHD.  She will consider this medication as well.  Denies suicidal or homicidal ideation.  Appetite and sleep are stable.  Has supportive family and friends.     She states her mood has been stable.  She is entering her senior year of college.  Was going to graduate May 2021 but is pushing that back to December 2021.  She is looking for engineering internships.  Program is challenging but she enjoys it.  Also established with Jennifer Jackson LCSW for  psychotherapy.  Financial situation is a bit stressed.  Living with her mother.  Has been attempting to increase physical activity which does cause her to feel better.  Patient describes that she was on sertraline years ago and did well with that but it did cause her to have some questionable decision making.     Depression symptoms: patient reports little interest or pleasure in doing things; feeling down, depressed, or hopeless; trouble falling asleep or staying asleep, or sleeping too much; feeling tired or having little energy; poor appetite/overeating; trouble concentrating. Denies thoughts of self-harm or suicide.     Anxiety symptoms: reports feeling nervous, anxious, or on edge; not being able to stop or control worrying; worrying too much about different things; becoming easily annoyed or irritable.     Natalie/Hypomania symptoms: denies periods of time in which she has gone without sleep with elevated or irritable mood and impulsive decision making.  Mood disorder questionnaire, she does admit to irritability, racing thoughts, distractibility at times more social or outgoing.     Psychosis: denies     Attention/Concentration:  Improved with Vyvanse     Body Image/Hx of eating disorders: denies     Suicidal ideation and risk: denies, no history of suicide attempts     Homicidal/Violient ideation and risk: denies     Sleep: not sleeping that well, trying to get back into sleep schedule, used to take melatonin      Appetite:  Adequate     GAD7:  Five, somewhat difficult  PHQ9:  13, extremely difficult  MDQ:  Negative screen     Past Psychiatric History:  Prior diagnoses: ADHD, depressive symptoms in the past     Inpatient psychiatric treatment:  No history of inpatient or intensive outpatient     Outpatient psychiatric treatment:  Primary care provider     Prior medications: zoloft, wellbutrin, vyvanse     Current medications: vyvanse     Prior suicide attempts:  None     Prior history self harm:  No history of  self-injurious behavior     Prior psychotherapy:  Currently involved with Jennifer Jackson LCSW        Allergies:       Review of patient's allergies indicates:   Allergen Reactions    Ceftriaxone Hives    Cefzil [cefprozil] Hives         Past Medical History:       Past Medical History:   Diagnosis Date    Acne      Asthma      Depression      Kidney stone      Seasonal allergic rhinitis 5/30/2018   G0     History TBI: denies  History seizures: denies     Past Surgical History:        Past Surgical History:   Procedure Laterality Date    TONSILLECTOMY             Family History:   Suicide: denies  Substance use: denies  Bipolar disorder/Psychotic disorder: denies  Anxiety: mother  Depression: mother     Social History:  Childhood: born in [place], raised by biological mother and father. Living with mom now. Father is staying with her grandmother. Establishing a relationship with her father. Two half sisters and step-sister. Doing well. Good relationship.  Marital status: supportive boyfriend  Children: none  Resides: [place]  Occupation: not working right now, going to try find part time job  Hobbies: trying to find a hobby  Yarsani: none  Education level: senior in college  : denies  Legal: denies  History of abuse/trauma: a bit of emotional trauma, working through in therapy     Substance History:  Tobacco: denies  Alcohol: occasionally, rarely  Drug use: denies  Caffeine: cup a day of coffee      Rehab:  Prior/current AA: denies    Review of Systems   PSYCHIATRIC: Pertinant items are noted in the narrative.  RESPIRATORY: No shortness of breath.  CARDIOVASCULAR: No tachycardia or chest pain.  GASTROINTESTINAL: No nausea, vomiting, pain, constipation or diarrhea.    Past Medical, Family and Social History: The patient's past medical, family and social history have been reviewed and updated as appropriate within the electronic medical record - see encounter notes.    Compliance: yes    Side effects:  none    Risk Parameters:  Patient reports no suicidal ideation  Patient reports no homicidal ideation  Patient reports no self-injurious behavior  Patient reports no violent behavior    Exam (detailed: at least 9 elements; comprehensive: all 15 elements)   Constitutional  Vitals:  Most recent vital signs, dated less than 90 days prior to this appointment, were reviewed.   There were no vitals filed for this visit.       General:  unremarkable, age appropriate, casually dressed     Musculoskeletal  Muscle Strength/Tone:  no dyskinesia   Gait & Station:  Virtual visit     Psychiatric  Speech:  no latency; no press   Mood & Affect:  appropriate   Thought Process:  normal and logical   Associations:  intact   Thought Content:  normal, no suicidality, no homicidality, delusions, or paranoia   Insight:  intact   Judgement: behavior is adequate to circumstances   Orientation:  grossly intact   Memory: intact for content of interview   Language: grossly intact   Attention Span & Concentration:  able to focus   Fund of Knowledge:  intact and appropriate to age and level of education     Assessment and Diagnosis   Status/Progress: Based on the examination today, the patient's problem(s) is/are improved.  New problems have not been presented today.   Co-morbidities, Diagnostic uncertainty and Lack of compliance are not complicating management of the primary condition.     General Impression:   Adjustment disorder  ADHD, combined presentation type    Intervention/Counseling/Treatment Plan   Medication Management: Continue current medications. The risks and benefits of medication were discussed with the patient.  Counseling provided with patient as follows: importance of compliance with chosen treatment options was emphasized, risks and benefits of treatment options, including medications, were discussed with the patient, risk factor reduction, prognosis    Almas is seen today for medication follow up.  Based on  assessment:    Continue sertraline 25 mg once daily for mood/anxiety. Will have very close f/u with this patient.   Continue low-dose Vyvanse 20 mg as needed for inattention/focus.  Discuss breastfeeding recommendations with stimulants.  She is understanding.  She has coordinated with her pediatrician regarding resuming stimulant.    Please go to emergency department if feeling as though you are a harm to yourself or others or if you are in crisis.     Please call the clinic to report any worsening of symptoms or problems associated with medication.    This medication has US boxed warning that states there may be an increased risk of suicidal thoughts and behavior in pediatric and young adult patients in short-term studies. Closely monitor all antidepressant-treated patients for clinical worsening, and for emergence of suicidal thoughts and behaviors.    Discussed with patient informed consent, risks vs. benefits, alternative treatments, side effect profile and the inherent unpredictability of individual responses to these treatments. The patient expresses understanding of the above and displays the capacity to agree with this current plan and had no other questions.    Visit today included increased complexity associated with managing the longitudinal care of the patient due to the serious and/or complex managed problem(s) anxiety/adhd.      Return to Clinic: as scheduled, as needed

## 2025-07-10 ENCOUNTER — OFFICE VISIT (OUTPATIENT)
Dept: OBSTETRICS AND GYNECOLOGY | Facility: CLINIC | Age: 25
End: 2025-07-10
Payer: COMMERCIAL

## 2025-07-10 VITALS
SYSTOLIC BLOOD PRESSURE: 132 MMHG | DIASTOLIC BLOOD PRESSURE: 90 MMHG | WEIGHT: 171.94 LBS | BODY MASS INDEX: 29.35 KG/M2 | HEIGHT: 64 IN

## 2025-07-10 DIAGNOSIS — N76.0 ACUTE VAGINITIS: Primary | ICD-10-CM

## 2025-07-10 PROCEDURE — 1159F MED LIST DOCD IN RCRD: CPT | Mod: CPTII,S$GLB,, | Performed by: NURSE PRACTITIONER

## 2025-07-10 PROCEDURE — 3075F SYST BP GE 130 - 139MM HG: CPT | Mod: CPTII,S$GLB,, | Performed by: NURSE PRACTITIONER

## 2025-07-10 PROCEDURE — 99213 OFFICE O/P EST LOW 20 MIN: CPT | Mod: S$GLB,,, | Performed by: NURSE PRACTITIONER

## 2025-07-10 PROCEDURE — 99999 PR PBB SHADOW E&M-EST. PATIENT-LVL III: CPT | Mod: PBBFAC,,, | Performed by: NURSE PRACTITIONER

## 2025-07-10 PROCEDURE — 3008F BODY MASS INDEX DOCD: CPT | Mod: CPTII,S$GLB,, | Performed by: NURSE PRACTITIONER

## 2025-07-10 PROCEDURE — 3080F DIAST BP >= 90 MM HG: CPT | Mod: CPTII,S$GLB,, | Performed by: NURSE PRACTITIONER

## 2025-07-10 RX ORDER — METRONIDAZOLE 500 MG/1
500 TABLET ORAL EVERY 12 HOURS
Qty: 28 TABLET | Refills: 0 | Status: SHIPPED | OUTPATIENT
Start: 2025-07-10 | End: 2025-07-24

## 2025-07-10 NOTE — PROGRESS NOTES
CC: vaginal itching / odor     HPI: Pt is a 25 y.o.  female who presents c/o vaginal itching and odor. She reports some burring with urination today. She reports some fatigue. No fever or back pain. She had cold intolerance yesterday. She is breastfeeding.       ROS:  GENERAL: Feeling well overall. Denies fever or chills.   SKIN: Denies rash or lesions.   HEAD: Denies head injury or headache.   NODES: Denies enlarged lymph nodes.   CHEST: Denies chest pain or shortness of breath.   CARDIOVASCULAR: Denies palpitations or left sided chest pain.   ABDOMEN: No abdominal pain, constipation, diarrhea, nausea, vomiting or rectal bleeding.   URINARY: No dysuria, hematuria, or burning on urination.  REPRODUCTIVE: See HPI.   BREASTS: Denies pain, lumps, or nipple discharge.   HEMATOLOGIC: No easy bruisability or excessive bleeding.   MUSCULOSKELETAL: Denies joint pain or swelling.   NEUROLOGIC: Denies syncope or weakness.   PSYCHIATRIC: Denies depression, anxiety or mood swings.    PE:   APPEARANCE: Well nourished, well developed, White female in no acute distress.  VULVA: No lesions. Normal external female genitalia.  URETHRAL MEATUS: Normal size and location, no lesions, no prolapse.  URETHRA: No masses, tenderness, or prolapse.  VAGINA: Moist. No lesions or lacerations noted. + thin  discharge present. No odor present.   CERVIX: No lesions or discharge. No cervical motion tenderness.   UTERUS: Normal size, regular shape, mobile, non-tender.  ADNEXA: No tenderness. No fullness or masses palpated in the adnexal regions.   ANUS PERINEUM: Normal.      Diagnosis:  1. Acute vaginitis        Plan:   Vaginosis cx  Flagyl for suspected BV- refill for partner- abstain for 1 week while on the meds and avoid alcohol while taking the med and for 24 hrs after the last dose   Boric Acid Suppositories sent insert nightly at bedtime as needed for 1 -2 nights at symptom onset to help stabilize the vaginal pH and improve symptoms      Vaginitis Prevention:  a. avoiding feminine products such as deoderant soaps, body wash, bubble bath, douches, scented toilet paper, deoderant tampons or pads, feminine wipes, chronic pad use, etc.  b. avoiding other vulvovaginal irritants such as long hot baths, humidity, tight, synthetic clothing, chlorine and sitting around in wet bathing suits  c. wearing cotton underwear, avoiding thong underwear and no underwear to bed  d. taking showers instead of baths and use a hair dryer on cool setting afterwards to dry  e. wearing cotton to exercise and shower immediately after exercise and change clothes  f. using polyurethane condoms without spermicide if sexually active and symptoms are triggered by intercourse      Orders Placed This Encounter    Vaginosis Screen by DNA Probe    metroNIDAZOLE (FLAGYL) 500 MG tablet    boric acid (BORIC ACID) vaginal suppository         Follow-up PRN no resolution of symptoms.    Suzy Sinha, FNP-C

## 2025-07-18 ENCOUNTER — PATIENT MESSAGE (OUTPATIENT)
Dept: OBSTETRICS AND GYNECOLOGY | Facility: CLINIC | Age: 25
End: 2025-07-18
Payer: COMMERCIAL

## 2025-07-18 DIAGNOSIS — B37.31 VAGINAL YEAST INFECTION: Primary | ICD-10-CM

## 2025-07-18 RX ORDER — FLUCONAZOLE 150 MG/1
150 TABLET ORAL EVERY OTHER DAY
Qty: 3 TABLET | Refills: 1 | Status: SHIPPED | OUTPATIENT
Start: 2025-07-18

## 2025-08-01 DIAGNOSIS — F90.2 ATTENTION DEFICIT HYPERACTIVITY DISORDER (ADHD), COMBINED TYPE: ICD-10-CM

## 2025-08-01 RX ORDER — LISDEXAMFETAMINE DIMESYLATE 20 MG/1
20 CAPSULE ORAL EVERY MORNING
Qty: 30 CAPSULE | Refills: 0 | Status: SHIPPED | OUTPATIENT
Start: 2025-08-01

## 2025-09-02 DIAGNOSIS — F90.2 ATTENTION DEFICIT HYPERACTIVITY DISORDER (ADHD), COMBINED TYPE: ICD-10-CM

## 2025-09-02 RX ORDER — LISDEXAMFETAMINE DIMESYLATE 20 MG/1
20 CAPSULE ORAL EVERY MORNING
Qty: 30 CAPSULE | Refills: 0 | Status: SHIPPED | OUTPATIENT
Start: 2025-09-02